# Patient Record
Sex: MALE | Race: WHITE | Employment: OTHER | URBAN - METROPOLITAN AREA
[De-identification: names, ages, dates, MRNs, and addresses within clinical notes are randomized per-mention and may not be internally consistent; named-entity substitution may affect disease eponyms.]

---

## 2017-08-16 ENCOUNTER — HOSPITAL ENCOUNTER (OUTPATIENT)
Dept: LAB | Age: 67
Discharge: HOME | End: 2017-08-16
Attending: INTERNAL MEDICINE
Payer: MEDICARE

## 2017-08-16 LAB
T4 FREE SERPL-MCNC: 1.3 NG/DL (ref 0.78–1.46)
TSH SERPL DL<=0.005 MIU/L-ACNC: 0.27 UIU/ML (ref 0.36–3.74)

## 2017-08-16 PROCEDURE — 84443 ASSAY THYROID STIM HORMONE: CPT | Performed by: INTERNAL MEDICINE

## 2017-08-16 PROCEDURE — 86800 THYROGLOBULIN ANTIBODY: CPT | Performed by: INTERNAL MEDICINE

## 2017-08-16 PROCEDURE — 36415 COLL VENOUS BLD VENIPUNCTURE: CPT | Performed by: INTERNAL MEDICINE

## 2017-08-16 PROCEDURE — 84432 ASSAY OF THYROGLOBULIN: CPT | Performed by: INTERNAL MEDICINE

## 2017-08-16 PROCEDURE — 84439 ASSAY OF FREE THYROXINE: CPT | Performed by: INTERNAL MEDICINE

## 2017-08-17 LAB
THYROGLOB AB SERPL-ACNC: <1 IU/ML (ref 0–0.9)
THYROGLOBULIN, SERUM, 006694: <0.1 NG/ML (ref 1.4–29.2)

## 2022-04-20 ENCOUNTER — HOSPITAL ENCOUNTER (INPATIENT)
Age: 72
LOS: 9 days | Discharge: HOME | DRG: 335 | End: 2022-04-29
Attending: EMERGENCY MEDICINE | Admitting: FAMILY MEDICINE
Payer: MEDICARE

## 2022-04-20 DIAGNOSIS — K45.8 INTERNAL HERNIA: ICD-10-CM

## 2022-04-20 DIAGNOSIS — D72.824 BASOPHILIC LEUKOCYTOSIS: ICD-10-CM

## 2022-04-20 DIAGNOSIS — K56.609 SBO (SMALL BOWEL OBSTRUCTION) (HCC): Primary | ICD-10-CM

## 2022-04-20 DIAGNOSIS — R10.84 GENERALIZED ABDOMINAL PAIN: ICD-10-CM

## 2022-04-20 DIAGNOSIS — D72.829 LEUKOCYTOSIS, UNSPECIFIED TYPE: ICD-10-CM

## 2022-04-20 LAB
ALBUMIN SERPL-MCNC: 3.8 G/DL (ref 3.2–4.6)
ALBUMIN/GLOB SERPL: 1 {RATIO} (ref 1.2–3.5)
ALP SERPL-CCNC: 104 U/L (ref 50–136)
ALT SERPL-CCNC: 55 U/L (ref 12–65)
ANION GAP SERPL CALC-SCNC: 9 MMOL/L (ref 7–16)
AST SERPL-CCNC: 37 U/L (ref 15–37)
BASOPHILS # BLD: 0 K/UL (ref 0–0.2)
BASOPHILS NFR BLD: 0 % (ref 0–2)
BILIRUB SERPL-MCNC: 1.1 MG/DL (ref 0.2–1.1)
BUN SERPL-MCNC: 22 MG/DL (ref 8–23)
CALCIUM SERPL-MCNC: 10 MG/DL (ref 8.3–10.4)
CHLORIDE SERPL-SCNC: 102 MMOL/L (ref 98–107)
CO2 SERPL-SCNC: 26 MMOL/L (ref 21–32)
CREAT SERPL-MCNC: 1.34 MG/DL (ref 0.8–1.5)
DIFFERENTIAL METHOD BLD: ABNORMAL
EOSINOPHIL # BLD: 0 K/UL (ref 0–0.8)
EOSINOPHIL NFR BLD: 0 % (ref 0.5–7.8)
ERYTHROCYTE [DISTWIDTH] IN BLOOD BY AUTOMATED COUNT: 13.6 % (ref 11.9–14.6)
EST. AVERAGE GLUCOSE BLD GHB EST-MCNC: 114 MG/DL
GLOBULIN SER CALC-MCNC: 4 G/DL (ref 2.3–3.5)
GLUCOSE SERPL-MCNC: 188 MG/DL (ref 65–100)
HBA1C MFR BLD: 5.6 % (ref 4.2–6.3)
HCT VFR BLD AUTO: 48.5 % (ref 41.1–50.3)
HGB BLD-MCNC: 16.8 G/DL (ref 13.6–17.2)
IMM GRANULOCYTES # BLD AUTO: 0.1 K/UL (ref 0–0.5)
IMM GRANULOCYTES NFR BLD AUTO: 0 % (ref 0–5)
LACTATE SERPL-SCNC: 1.5 MMOL/L (ref 0.4–2)
LIPASE SERPL-CCNC: 71 U/L (ref 73–393)
LYMPHOCYTES # BLD: 0.9 K/UL (ref 0.5–4.6)
LYMPHOCYTES NFR BLD: 5 % (ref 13–44)
MCH RBC QN AUTO: 32.1 PG (ref 26.1–32.9)
MCHC RBC AUTO-ENTMCNC: 34.6 G/DL (ref 31.4–35)
MCV RBC AUTO: 92.7 FL (ref 79.6–97.8)
MONOCYTES # BLD: 0.9 K/UL (ref 0.1–1.3)
MONOCYTES NFR BLD: 5 % (ref 4–12)
NEUTS SEG # BLD: 17.4 K/UL (ref 1.7–8.2)
NEUTS SEG NFR BLD: 90 % (ref 43–78)
NRBC # BLD: 0 K/UL (ref 0–0.2)
PLATELET # BLD AUTO: 260 K/UL (ref 150–450)
PMV BLD AUTO: 8.8 FL (ref 9.4–12.3)
POTASSIUM SERPL-SCNC: 4 MMOL/L (ref 3.5–5.1)
PROT SERPL-MCNC: 7.8 G/DL (ref 6.3–8.2)
RBC # BLD AUTO: 5.23 M/UL (ref 4.23–5.6)
SODIUM SERPL-SCNC: 137 MMOL/L (ref 136–145)
WBC # BLD AUTO: 19.3 K/UL (ref 4.3–11.1)

## 2022-04-20 PROCEDURE — 83690 ASSAY OF LIPASE: CPT

## 2022-04-20 PROCEDURE — 74011250636 HC RX REV CODE- 250/636: Performed by: FAMILY MEDICINE

## 2022-04-20 PROCEDURE — 74011250636 HC RX REV CODE- 250/636: Performed by: EMERGENCY MEDICINE

## 2022-04-20 PROCEDURE — 65270000046 HC RM TELEMETRY

## 2022-04-20 PROCEDURE — 81003 URINALYSIS AUTO W/O SCOPE: CPT

## 2022-04-20 PROCEDURE — 87040 BLOOD CULTURE FOR BACTERIA: CPT

## 2022-04-20 PROCEDURE — 85025 COMPLETE CBC W/AUTO DIFF WBC: CPT

## 2022-04-20 PROCEDURE — 99285 EMERGENCY DEPT VISIT HI MDM: CPT

## 2022-04-20 PROCEDURE — 80053 COMPREHEN METABOLIC PANEL: CPT

## 2022-04-20 PROCEDURE — 83605 ASSAY OF LACTIC ACID: CPT

## 2022-04-20 PROCEDURE — 74011000250 HC RX REV CODE- 250: Performed by: FAMILY MEDICINE

## 2022-04-20 PROCEDURE — 74011000258 HC RX REV CODE- 258: Performed by: EMERGENCY MEDICINE

## 2022-04-20 PROCEDURE — 83036 HEMOGLOBIN GLYCOSYLATED A1C: CPT

## 2022-04-20 PROCEDURE — 74011000250 HC RX REV CODE- 250: Performed by: PHYSICIAN ASSISTANT

## 2022-04-20 RX ORDER — METOPROLOL TARTRATE 5 MG/5ML
2.5 INJECTION INTRAVENOUS EVERY 8 HOURS
Status: DISCONTINUED | OUTPATIENT
Start: 2022-04-20 | End: 2022-04-23

## 2022-04-20 RX ORDER — ACETAMINOPHEN 650 MG/1
650 SUPPOSITORY RECTAL
Status: DISCONTINUED | OUTPATIENT
Start: 2022-04-20 | End: 2022-04-26

## 2022-04-20 RX ORDER — SODIUM CHLORIDE 0.9 % (FLUSH) 0.9 %
5-40 SYRINGE (ML) INJECTION EVERY 8 HOURS
Status: DISCONTINUED | OUTPATIENT
Start: 2022-04-20 | End: 2022-04-21

## 2022-04-20 RX ORDER — DEXTROSE MONOHYDRATE 100 MG/ML
125-250 INJECTION, SOLUTION INTRAVENOUS AS NEEDED
Status: DISCONTINUED | OUTPATIENT
Start: 2022-04-20 | End: 2022-04-29 | Stop reason: HOSPADM

## 2022-04-20 RX ORDER — DEXTROSE, SODIUM CHLORIDE, AND POTASSIUM CHLORIDE 5; .9; .15 G/100ML; G/100ML; G/100ML
100 INJECTION INTRAVENOUS CONTINUOUS
Status: DISCONTINUED | OUTPATIENT
Start: 2022-04-20 | End: 2022-04-22

## 2022-04-20 RX ORDER — ENOXAPARIN SODIUM 100 MG/ML
40 INJECTION SUBCUTANEOUS DAILY
Status: DISCONTINUED | OUTPATIENT
Start: 2022-04-21 | End: 2022-04-28

## 2022-04-20 RX ORDER — ONDANSETRON 2 MG/ML
4 INJECTION INTRAMUSCULAR; INTRAVENOUS
Status: DISCONTINUED | OUTPATIENT
Start: 2022-04-20 | End: 2022-04-29 | Stop reason: HOSPADM

## 2022-04-20 RX ORDER — HYDROMORPHONE HYDROCHLORIDE 1 MG/ML
1 INJECTION, SOLUTION INTRAMUSCULAR; INTRAVENOUS; SUBCUTANEOUS
Status: COMPLETED | OUTPATIENT
Start: 2022-04-20 | End: 2022-04-20

## 2022-04-20 RX ORDER — ACETAMINOPHEN 325 MG/1
650 TABLET ORAL
Status: DISCONTINUED | OUTPATIENT
Start: 2022-04-20 | End: 2022-04-29 | Stop reason: HOSPADM

## 2022-04-20 RX ORDER — SODIUM CHLORIDE 0.9 % (FLUSH) 0.9 %
5-40 SYRINGE (ML) INJECTION EVERY 8 HOURS
Status: DISCONTINUED | OUTPATIENT
Start: 2022-04-20 | End: 2022-04-29 | Stop reason: HOSPADM

## 2022-04-20 RX ORDER — SODIUM CHLORIDE 0.9 % (FLUSH) 0.9 %
5-40 SYRINGE (ML) INJECTION AS NEEDED
Status: DISCONTINUED | OUTPATIENT
Start: 2022-04-20 | End: 2022-04-29 | Stop reason: HOSPADM

## 2022-04-20 RX ORDER — MORPHINE SULFATE 4 MG/ML
4 INJECTION INTRAVENOUS
Status: DISCONTINUED | OUTPATIENT
Start: 2022-04-20 | End: 2022-04-20

## 2022-04-20 RX ORDER — NALOXONE HYDROCHLORIDE 0.4 MG/ML
0.2 INJECTION, SOLUTION INTRAMUSCULAR; INTRAVENOUS; SUBCUTANEOUS
Status: DISCONTINUED | OUTPATIENT
Start: 2022-04-20 | End: 2022-04-29 | Stop reason: HOSPADM

## 2022-04-20 RX ORDER — ASCORBIC ACID 500 MG
500 TABLET ORAL DAILY
COMMUNITY
End: 2022-04-29

## 2022-04-20 RX ORDER — MAGNESIUM SULFATE 100 %
16 CRYSTALS MISCELLANEOUS AS NEEDED
Status: DISCONTINUED | OUTPATIENT
Start: 2022-04-20 | End: 2022-04-29 | Stop reason: HOSPADM

## 2022-04-20 RX ORDER — ONDANSETRON 4 MG/1
4 TABLET, ORALLY DISINTEGRATING ORAL
Status: DISCONTINUED | OUTPATIENT
Start: 2022-04-20 | End: 2022-04-21

## 2022-04-20 RX ORDER — POLYETHYLENE GLYCOL 3350 17 G/17G
17 POWDER, FOR SOLUTION ORAL DAILY PRN
Status: DISCONTINUED | OUTPATIENT
Start: 2022-04-20 | End: 2022-04-21

## 2022-04-20 RX ORDER — HYDROMORPHONE HYDROCHLORIDE 1 MG/ML
0.5 INJECTION, SOLUTION INTRAMUSCULAR; INTRAVENOUS; SUBCUTANEOUS
Status: DISCONTINUED | OUTPATIENT
Start: 2022-04-20 | End: 2022-04-21

## 2022-04-20 RX ORDER — SODIUM CHLORIDE 0.9 % (FLUSH) 0.9 %
5-40 SYRINGE (ML) INJECTION AS NEEDED
Status: DISCONTINUED | OUTPATIENT
Start: 2022-04-20 | End: 2022-04-21

## 2022-04-20 RX ADMIN — METOPROLOL TARTRATE 2.5 MG: 5 INJECTION INTRAVENOUS at 21:57

## 2022-04-20 RX ADMIN — PIPERACILLIN AND TAZOBACTAM 4.5 G: 4; .5 INJECTION, POWDER, LYOPHILIZED, FOR SOLUTION INTRAVENOUS at 18:45

## 2022-04-20 RX ADMIN — SODIUM CHLORIDE 1000 ML: 900 INJECTION, SOLUTION INTRAVENOUS at 18:40

## 2022-04-20 RX ADMIN — MORPHINE SULFATE 4 MG: 4 INJECTION INTRAVENOUS at 18:45

## 2022-04-20 RX ADMIN — SODIUM CHLORIDE, PRESERVATIVE FREE 10 ML: 5 INJECTION INTRAVENOUS at 21:56

## 2022-04-20 RX ADMIN — HYDROMORPHONE HYDROCHLORIDE 1 MG: 1 INJECTION, SOLUTION INTRAMUSCULAR; INTRAVENOUS; SUBCUTANEOUS at 19:46

## 2022-04-20 RX ADMIN — POTASSIUM CHLORIDE, DEXTROSE MONOHYDRATE AND SODIUM CHLORIDE 100 ML/HR: 150; 5; 900 INJECTION, SOLUTION INTRAVENOUS at 21:10

## 2022-04-20 NOTE — H&P
Hospitalist History and Physical   Admit Date:  2022  4:44 PM   Name:  Tamanna Rose. Age:  80 y.o. Sex:  male  :  1950   MRN:  [id number]   Room:  [place] [address]    Presenting Complaint: Abdominal Pain and Abnormal Lab Results    Reason(s) for Admission: SBO (small bowel obstruction) ([facility] [address] [K56.609]     History of Present Illness:   Tamanna Hutchison is a 80 y.o. male with medical history of GERD s/p nissen fundoplication 12, thyroid CA s/p resection, HTN, CAD who presented with epigastrict abdominal pain and RUQ pain that started around 4pm yesterday. Has not passed gas or had a BM since onset of symptoms. He had an outpatient CT today showing high grade SBO @ [facility] but wanted to be admitted at [address] [place] [address] so he drove here. Labs significant for WBC 19K with neutrophile predominance. rec'd zosyn in ED. General surgery was notified and requested hospitalist admission. Patient in severe discomfort on my eval. Family at bedside. No PO intake > 24 hours. Review of Systems:  10 systems reviewed and negative except as noted in HPI. Assessment & Plan:     High grade SBO - NPO. IVF. NGT LIS. Pain control. Anti-emetics. Zosyn. Surgery to see. Postsurgical hypothyroidism - hold hormone replacement at this time due to NPO state. pendin gclinical course will start IV     HTN - chronically on BB. Start metoprolol 2.5 q8h IV to prevent rebound tachycardia     Hyperglycemia - perhaps reactive to stress. FSBQ q6h plus high sensitivity SSI    CAD - BB as above.  Hold other meds for now while NPO      GERD - IV pepcid daily       Dispo/Discharge Planning:     Admit remote tele     Diet: DIET NPO  VTE ppx: lovenox   Code status: Full Code    Hospital Problems as of 2022 Date Reviewed: 2021          Codes Class Noted - Resolved POA    * (Principal) SBO (small bowel obstruction) ([facility] [address] ICD-10-CM: Z59.681  ICD-9-CM: 560.9  2022 - Present Unknown        Postsurgical hypothyroidism ICD-10-CM: E89.0  ICD-9-CM: 244.0  Unknown - Present Yes        Papillary thyroid carcinoma, multifocal (3 tumors in the left lobe, largest tumor 0.9 cm), status post thyroidectomy/central lymph node dissection 5/26/2015 and 54.5 mCi iodine-131 7/22/2015 ICD-10-CM: C73  ICD-9-CM: 572  6/21/2016 - Present Yes        GERD (gastroesophageal reflux disease) ICD-10-CM: K21.9  ICD-9-CM: 530.81  Unknown - Present Yes        Chronic coronary artery disease ICD-10-CM: I25.10  ICD-9-CM: 414.00  9/3/2015 - Present Yes    Overview Signed 6/21/2016  8:29 AM by Randall JOHNS     Overview:   BMS RCA 2008; NORMAL STRESS TEST 2014    Last Assessment & Plan:   He does not have any anginal quality chest pain. He occasionally has sharp atypical pain. His stress test was normal less than a year ago. I don't plan any further investigation. Past History:  Past Medical History:   Diagnosis Date    Arthritis     Chronic coronary artery disease     Gastroesophageal reflux disease     Gout     Hypercholesterolemia     Hypertension     Malignant melanoma     Papillary thyroid carcinoma, multifocal (3 tumors in the left lobe, largest tumor 0.9 cm), status post thyroidectomy/central lymph node dissection 5/26/2015 and 54.5 mCi iodine-131 7/22/2015     Postsurgical hypothyroidism      Past Surgical History:   Procedure Laterality Date    HX APPENDECTOMY  1969    HX CHOLECYSTECTOMY  2005    HX CORONARY ARTERY BYPASS GRAFT  08/19/2016    Dr. Freda Luis at [address] [facility]  2010    HX GI  2008    Nissen fundoplication    HX KNEE ARTHROSCOPY Right 2005    HX MALIGNANT SKIN LESION EXCISION  2011    melanoma removed from ear lobe    HX THYROIDECTOMY  5/26/2015    including central lymph node dissection    HX TONSILLECTOMY  Age 4      No Known Allergies   Social History     Tobacco Use    Smoking status: Never Smoker    Smokeless tobacco: Never Used   Substance Use Topics    Alcohol use:  No Family History   Problem Relation Age of Onset    Cancer Mother         Lung    Arthritis-rheumatoid Father     Coronary Art Dis Father     Cancer Brother         Melanoma    Thyroid Cancer Neg Hx       Family history reviewed and negative except as noted above. Immunization History   Administered Date(s) Administered    Influenza Vaccine PF 12/06/2005     Prior to Admit Medications:  Current Outpatient Medications   Medication Instructions    allopurinoL (ZYLOPRIM) 300 mg, Oral    amitriptyline (ELAVIL) 10 mg, Oral, EVERY BEDTIME    amLODIPine (NORVASC) 5 mg tablet No dose, route, or frequency recorded.  aspirin delayed-release 81 mg, Oral    cholecalciferol (VITAMIN D3) 2,000 Units, Oral, DAILY    coenzyme Q10 200 mg, Oral, DAILY    cyanocobalamin, vitamin B-12, 5,000 mcg TbDL 1 Tablet, Oral    ibuprofen (MOTRIN) 800 mg, Oral, EVERY 6 HOURS AS NEEDED    isosorbide mononitrate ER (IMDUR) 30 mg tablet No dose, route, or frequency recorded.  KRILL OIL PO Oral    LevoxyL 137 mcg, Oral, DAILY BEFORE BREAKFAST    nitroglycerin (NITROSTAT) 0.4 mg, SubLINGual    pravastatin (PRAVACHOL) 80 mg, Oral    Toprol XL 25 mg, Oral, DAILY       Objective:     Patient Vitals for the past 24 hrs:   Temp Pulse Resp BP SpO2   04/20/22 1641 98.4 °F (36.9 °C) 95 20 (!) 146/68 91 %     Oxygen Therapy  O2 Sat (%): 91 % (04/20/22 1641)  O2 Device: None (Room air) (04/20/22 1641)    Estimated body mass index is 27.06 kg/m² as calculated from the following:    Height as of this encounter: 5' 8\" (1.727 m). Weight as of this encounter: 80.7 kg (178 lb). No intake or output data in the 24 hours ending 04/20/22 1832      Physical Exam:    Blood pressure (!) 146/68, pulse 95, temperature 98.4 °F (36.9 °C), resp. rate 20, height 5' 8\" (1.727 m), weight 80.7 kg (178 lb), SpO2 91 %. General:    Well nourished.   Moderate distress  Head:  Normocephalic, atraumatic  Eyes:  Sclerae appear normal.  Pupils equally round.  ENT:  Nares appear normal, no drainage. Moist oral mucosa  Neck:  No restricted ROM. Trachea midline   CV:   RRR. No m/r/g. No jugular venous distension. Lungs:   CTAB. No wheezing, rhonchi, or rales. Respirations even, unlabored  Abdomen: Bowel sounds absent. Distended, soft, tender. Extremities: No cyanosis or clubbing. No edema  Skin:     No rashes and normal coloration. Warm and dry. Neuro:  CN II-XII grossly intact. Sensation intact. A&Ox3  Psych:  Normal mood and affect. I have reviewed ordered lab tests and independently visualized imaging below:    Last 24hr Labs:  Recent Results (from the past 24 hour(s))   CBC WITH AUTOMATED DIFF    Collection Time: 04/20/22  5:08 PM   Result Value Ref Range    WBC 19.3 (H) 4.3 - 11.1 K/uL    RBC 5.23 4.23 - 5.6 M/uL    HGB 16.8 13.6 - 17.2 g/dL    HCT 48.5 41.1 - 50.3 %    MCV 92.7 79.6 - 97.8 FL    MCH 32.1 26.1 - 32.9 PG    MCHC 34.6 31.4 - 35.0 g/dL    RDW 13.6 11.9 - 14.6 %    PLATELET 321 737 - 434 K/uL    MPV 8.8 (L) 9.4 - 12.3 FL    ABSOLUTE NRBC 0.00 0.0 - 0.2 K/uL    DF AUTOMATED      NEUTROPHILS 90 (H) 43 - 78 %    LYMPHOCYTES 5 (L) 13 - 44 %    MONOCYTES 5 4.0 - 12.0 %    EOSINOPHILS 0 (L) 0.5 - 7.8 %    BASOPHILS 0 0.0 - 2.0 %    IMMATURE GRANULOCYTES 0 0.0 - 5.0 %    ABS. NEUTROPHILS 17.4 (H) 1.7 - 8.2 K/UL    ABS. LYMPHOCYTES 0.9 0.5 - 4.6 K/UL    ABS. MONOCYTES 0.9 0.1 - 1.3 K/UL    ABS. EOSINOPHILS 0.0 0.0 - 0.8 K/UL    ABS. BASOPHILS 0.0 0.0 - 0.2 K/UL    ABS. IMM.  GRANS. 0.1 0.0 - 0.5 K/UL   METABOLIC PANEL, COMPREHENSIVE    Collection Time: 04/20/22  5:08 PM   Result Value Ref Range    Sodium 137 136 - 145 mmol/L    Potassium 4.0 3.5 - 5.1 mmol/L    Chloride 102 98 - 107 mmol/L    CO2 26 21 - 32 mmol/L    Anion gap 9 7 - 16 mmol/L    Glucose 188 (H) 65 - 100 mg/dL    BUN 22 8 - 23 MG/DL    Creatinine 1.34 0.8 - 1.5 MG/DL    GFR est AA >60 >60 ml/min/1.73m2    GFR est non-AA 56 (L) >60 ml/min/1.73m2    Calcium 10.0 8.3 - 10.4 MG/DL    Bilirubin, total 1.1 0.2 - 1.1 MG/DL    ALT (SGPT) 55 12 - 65 U/L    AST (SGOT) 37 15 - 37 U/L    Alk. phosphatase 104 50 - 136 U/L    Protein, total 7.8 6.3 - 8.2 g/dL    Albumin 3.8 3.2 - 4.6 g/dL    Globulin 4.0 (H) 2.3 - 3.5 g/dL    A-G Ratio 1.0 (L) 1.2 - 3.5     LIPASE    Collection Time: 04/20/22  5:08 PM   Result Value Ref Range    Lipase 71 (L) 73 - 393 U/L       All Micro Results     Procedure Component Value Units Date/Time    CULTURE, BLOOD [[id number]] Collected: 04/20/22 1759    Order Status: Completed Specimen: Blood Updated: 04/20/22 1815    CULTURE, BLOOD [[id number]]     Order Status: Sent Specimen: Blood           Other Studies:  No results found. Signed:  Aramis Escobar DO    Part of this note may have been written by using a voice dictation software. The note has been proof read but may still contain some grammatical/other typographical errors.

## 2022-04-20 NOTE — ED NOTES
Pt to er c/o abd pain started yesterday, saw pmd yesterday who ordered outpt  Ct, + for sbo, pt had had previous nisson, surgery, appy and cholecystectomy. No fever  Patient evaluated initially in triage. Rapid Medical Evaluation was conducted and necessary orders have been placed. I have performed a medical screening exam.  Care will now be transferred to the provider in the back of the emergency department.   TEOFILO Rodrigues 4:43 PM

## 2022-04-20 NOTE — ED PROVIDER NOTES
10year-old male presenting to the emergency department today complaining of epigastric abdominal pain and right upper quadrant abdominal pain. The patient said it started around 4:00 yesterday. He has not been passing gas or had a bowel movement since onset of symptoms. The patient states that he had a Nissen fundoplication in 5723 by Dr. Farhana Izquierdo. The patient had this because of history of chronic acid reflux but did not have any improvement in his symptoms after the procedure. The patient had an outpatient CT done today which showed a high-grade small bowel obstruction and he [facility] to be admitted to [facility] not [facility] so he drove here.            Past Medical History:   Diagnosis Date    Arthritis     Chronic coronary artery disease     Gastroesophageal reflux disease     Gout     Hypercholesterolemia     Hypertension     Malignant melanoma     Papillary thyroid carcinoma, multifocal (3 tumors in the left lobe, largest tumor 0.9 cm), status post thyroidectomy/central lymph node dissection 5/26/2015 and 54.5 mCi iodine-131 7/22/2015     Postsurgical hypothyroidism        Past Surgical History:   Procedure Laterality Date    HX APPENDECTOMY  1969    HX CHOLECYSTECTOMY  2005    HX CORONARY ARTERY BYPASS GRAFT  08/19/2016    Dr. Ernie Rangel at [address] [facility]  2010    HX GI  2008    Nissen fundoplication    HX KNEE ARTHROSCOPY Right 2005    HX MALIGNANT SKIN LESION EXCISION  2011    melanoma removed from ear lobe    HX THYROIDECTOMY  5/26/2015    including central lymph node dissection    HX TONSILLECTOMY  Age 4         Family History:   Problem Relation Age of Onset    Cancer Mother         Lung    Arthritis-rheumatoid Father     Coronary Art Dis Father     Cancer Brother         Melanoma    Thyroid Cancer Neg Hx        Social History     Socioeconomic History    Marital status:      Spouse name: Not on file    Number of children: Not on file    Years of education: Not on file    Highest education level: Not on file   Occupational History    Not on file   Tobacco Use    Smoking status: Never Smoker    Smokeless tobacco: Never Used   Substance and Sexual Activity    Alcohol use: No    Drug use: No    Sexual activity: Not on file   Other Topics Concern    Not on file   Social History Narrative    Not on file     Social Determinants of Health     Financial Resource Strain:     Difficulty of Paying Living Expenses: Not on file   Food Insecurity:     Worried About Running Out of Food in the Last Year: Not on file    [place] of Food in the Last Year: Not on file   Transportation Needs:     Lack of Transportation (Medical): Not on file    Lack of Transportation (Non-Medical): Not on file   Physical Activity:     Days of Exercise per Week: Not on file    Minutes of Exercise per Session: Not on file   Stress:     Feeling of Stress : Not on file   Social Connections:     Frequency of Communication with Friends and Family: Not on file    Frequency of Social Gatherings with Friends and Family: Not on file    Attends [employer] Services: Not on file    Active Member of [address] [place] [address] or Organizations: Not on file    Attends Club or Organization Meetings: Not on file    Marital Status: Not on file   Intimate Partner Violence:     Fear of Current or Ex-Partner: Not on file    Emotionally Abused: Not on file    Physically Abused: Not on file    Sexually Abused: Not on file   Housing Stability:     Unable to Pay for Housing in the Last Year: Not on file    Number of [place] in the Last Year: Not on file    Unstable Housing in the Last Year: Not on file         ALLERGIES: Patient has no known allergies. Review of Systems   Gastrointestinal: Positive for abdominal distention, abdominal pain and nausea. Negative for vomiting. All other systems reviewed and are negative.       Vitals:    04/20/22 1641   BP: (!) 146/68   Pulse: 95   Resp: 20   Temp: 98.4 °F (36.9 °C)   SpO2: 91%   Weight: 80.7 kg (178 lb)   Height: 5' 8\" (1.727 m)            Physical Exam     GENERAL:The patient has Body mass index is 27.06 kg/m². Well-hydrated. VITAL SIGNS: Heart rate, blood pressure, respiratory rate reviewed as recorded in  nurse's notes  EYES: Pupils reactive. Extraocular motion intact. No conjunctival redness or drainage. NECK: Supple, no meningeal signs. Trachea midline. No masses or thyromegaly. LUNGS: Breath sounds clear and equal bilaterally no accessory muscle use. CHEST: No deformity  CARDIOVASCULAR: Regular rate and rhythm  ABDOMEN: Soft with epigastric and RUQ tenderness. No palpable masses or organomegaly. No  peritoneal signs. No rigidity. EXTREMITIES: No clubbing or cyanosis. No joint swelling. Normal muscle tone. No  restricted range of motion appreciated. NEUROLOGIC: Sensation is grossly intact. Cranial nerve exam reveals face is  symmetrical, tongue is midline speech is clear. SKIN: No rash or petechiae. Good skin turgor palpated. PSYCHIATRIC: Alert and oriented. Appropriate behavior and judgment. MDM  Number of Diagnoses or Management Options  Diagnosis management comments: Abdominal wall pain,     Constipation, fecal impaction, small bowel obstruction, partial small bowel obstruction,  Ileus          Amount and/or Complexity of Data Reviewed  Clinical lab tests: ordered and reviewed  Tests in the radiology section of CPT®: reviewed  Tests in the medicine section of CPT®: reviewed and ordered  Review and summarize past medical records: yes  Discuss the patient with other providers: yes      ED Course as of 04/20/22 1753   Wed Apr 20, 2022   5877 I spoke to Dr. Isrrael Walters with general surgery and he requested that hospitalist admit the patient and they will consult tomorrow. NG tube to be placed in the ER. [AV]   6616 I talked to the patient with the findings on his CT and the plan of care for treating his small bowel obstruction.   He is agreeable with this plan. [DX]   3180 Because of the elevated white count with left shift and high-grade small bowel obstruction patient will be started on prophylactic antibiotic coverage for possible intestinal perforation not seen on CT. [KH]   1751 Hospitalist Dr. Robert Danielle was consulted and patient will be admitted to their service.  [KH]      ED Course User Index  [KH] Nereida Zuniga DO       Procedures

## 2022-04-20 NOTE — ED NOTES
TRANSFER - OUT REPORT:    Verbal report given to [employer] RN on [facility].  being transferred to room 341 for routine progression of care       Report consisted of patients Situation, Background, Assessment and   Recommendations(SBAR). Information from the following report(s) SBAR was reviewed with the receiving nurse. Lines:   Peripheral IV 04/20/22 Left Antecubital (Active)        Opportunity for questions and clarification was provided.       Patient transported with:   [employer]

## 2022-04-20 NOTE — ED TRIAGE NOTES
Pt states he had outpatient CT and was told he had a bowel obstruction. Pt with sever abdominal pain since yesterday.

## 2022-04-21 ENCOUNTER — APPOINTMENT (OUTPATIENT)
Dept: GENERAL RADIOLOGY | Age: 72
DRG: 335 | End: 2022-04-21
Attending: FAMILY MEDICINE
Payer: MEDICARE

## 2022-04-21 ENCOUNTER — ANESTHESIA (OUTPATIENT)
Dept: SURGERY | Age: 72
DRG: 335 | End: 2022-04-21
Payer: MEDICARE

## 2022-04-21 ENCOUNTER — ANESTHESIA EVENT (OUTPATIENT)
Dept: SURGERY | Age: 72
DRG: 335 | End: 2022-04-21
Payer: MEDICARE

## 2022-04-21 ENCOUNTER — APPOINTMENT (OUTPATIENT)
Dept: ULTRASOUND IMAGING | Age: 72
DRG: 335 | End: 2022-04-21
Attending: FAMILY MEDICINE
Payer: MEDICARE

## 2022-04-21 PROBLEM — R74.01 TRANSAMINITIS: Status: ACTIVE | Noted: 2022-04-21

## 2022-04-21 PROBLEM — N17.0 ACUTE KIDNEY INJURY (AKI) WITH ACUTE TUBULAR NECROSIS (ATN) (HCC): Status: ACTIVE | Noted: 2022-04-21

## 2022-04-21 PROBLEM — D72.829 LEUKOCYTOSIS: Status: ACTIVE | Noted: 2022-04-21

## 2022-04-21 PROBLEM — R10.84 GENERALIZED ABDOMINAL PAIN: Status: ACTIVE | Noted: 2022-04-21

## 2022-04-21 LAB
ALBUMIN SERPL-MCNC: 3.1 G/DL (ref 3.2–4.6)
ALBUMIN/GLOB SERPL: 0.9 {RATIO} (ref 1.2–3.5)
ALP SERPL-CCNC: 115 U/L (ref 50–136)
ALT SERPL-CCNC: 395 U/L (ref 12–65)
ANION GAP SERPL CALC-SCNC: 5 MMOL/L (ref 7–16)
AST SERPL-CCNC: 213 U/L (ref 15–37)
BASOPHILS # BLD: 0.1 K/UL (ref 0–0.2)
BASOPHILS NFR BLD: 0 % (ref 0–2)
BILIRUB SERPL-MCNC: 1.7 MG/DL (ref 0.2–1.1)
BUN SERPL-MCNC: 29 MG/DL (ref 8–23)
CALCIUM SERPL-MCNC: 9.3 MG/DL (ref 8.3–10.4)
CHLORIDE SERPL-SCNC: 106 MMOL/L (ref 98–107)
CO2 SERPL-SCNC: 27 MMOL/L (ref 21–32)
CREAT SERPL-MCNC: 1.49 MG/DL (ref 0.8–1.5)
DIFFERENTIAL METHOD BLD: ABNORMAL
EOSINOPHIL # BLD: 0 K/UL (ref 0–0.8)
EOSINOPHIL NFR BLD: 0 % (ref 0.5–7.8)
ERYTHROCYTE [DISTWIDTH] IN BLOOD BY AUTOMATED COUNT: 13.9 % (ref 11.9–14.6)
GLOBULIN SER CALC-MCNC: 3.6 G/DL (ref 2.3–3.5)
GLUCOSE BLD STRIP.AUTO-MCNC: 114 MG/DL (ref 65–100)
GLUCOSE BLD STRIP.AUTO-MCNC: 142 MG/DL (ref 65–100)
GLUCOSE BLD STRIP.AUTO-MCNC: 179 MG/DL (ref 65–100)
GLUCOSE BLD STRIP.AUTO-MCNC: 215 MG/DL (ref 65–100)
GLUCOSE SERPL-MCNC: 203 MG/DL (ref 65–100)
HCT VFR BLD AUTO: 48.2 % (ref 41.1–50.3)
HGB BLD-MCNC: 16.6 G/DL (ref 13.6–17.2)
IMM GRANULOCYTES # BLD AUTO: 0.1 K/UL (ref 0–0.5)
IMM GRANULOCYTES NFR BLD AUTO: 0 % (ref 0–5)
LACTATE SERPL-SCNC: 1.9 MMOL/L (ref 0.4–2)
LYMPHOCYTES # BLD: 0.6 K/UL (ref 0.5–4.6)
LYMPHOCYTES NFR BLD: 3 % (ref 13–44)
MAGNESIUM SERPL-MCNC: 2.1 MG/DL (ref 1.8–2.4)
MCH RBC QN AUTO: 32.2 PG (ref 26.1–32.9)
MCHC RBC AUTO-ENTMCNC: 34.4 G/DL (ref 31.4–35)
MCV RBC AUTO: 93.6 FL (ref 79.6–97.8)
MONOCYTES # BLD: 1.1 K/UL (ref 0.1–1.3)
MONOCYTES NFR BLD: 7 % (ref 4–12)
NEUTS SEG # BLD: 14.9 K/UL (ref 1.7–8.2)
NEUTS SEG NFR BLD: 89 % (ref 43–78)
NRBC # BLD: 0 K/UL (ref 0–0.2)
PLATELET # BLD AUTO: 250 K/UL (ref 150–450)
PMV BLD AUTO: 8.7 FL (ref 9.4–12.3)
POTASSIUM SERPL-SCNC: 4.3 MMOL/L (ref 3.5–5.1)
PROT SERPL-MCNC: 6.7 G/DL (ref 6.3–8.2)
RBC # BLD AUTO: 5.15 M/UL (ref 4.23–5.6)
SERVICE CMNT-IMP: ABNORMAL
SODIUM SERPL-SCNC: 138 MMOL/L (ref 136–145)
WBC # BLD AUTO: 16.7 K/UL (ref 4.3–11.1)

## 2022-04-21 PROCEDURE — 77030040922 HC BLNKT HYPOTHRM STRY -A: Performed by: ANESTHESIOLOGY

## 2022-04-21 PROCEDURE — 44050 REDUCE BOWEL OBSTRUCTION: CPT | Performed by: SURGERY

## 2022-04-21 PROCEDURE — 76210000016 HC OR PH I REC 1 TO 1.5 HR: Performed by: SURGERY

## 2022-04-21 PROCEDURE — 77030040830 HC CATH URETH FOL MDII -A: Performed by: SURGERY

## 2022-04-21 PROCEDURE — 74011250636 HC RX REV CODE- 250/636: Performed by: SURGERY

## 2022-04-21 PROCEDURE — 76060000033 HC ANESTHESIA 1 TO 1.5 HR: Performed by: SURGERY

## 2022-04-21 PROCEDURE — 80053 COMPREHEN METABOLIC PANEL: CPT

## 2022-04-21 PROCEDURE — 74011000258 HC RX REV CODE- 258: Performed by: FAMILY MEDICINE

## 2022-04-21 PROCEDURE — 74011000250 HC RX REV CODE- 250

## 2022-04-21 PROCEDURE — 36415 COLL VENOUS BLD VENIPUNCTURE: CPT

## 2022-04-21 PROCEDURE — 76705 ECHO EXAM OF ABDOMEN: CPT

## 2022-04-21 PROCEDURE — 65270000029 HC RM PRIVATE

## 2022-04-21 PROCEDURE — 0DN80ZZ RELEASE SMALL INTESTINE, OPEN APPROACH: ICD-10-PCS | Performed by: SURGERY

## 2022-04-21 PROCEDURE — 83735 ASSAY OF MAGNESIUM: CPT

## 2022-04-21 PROCEDURE — 77030002996 HC SUT SLK J&J -A: Performed by: SURGERY

## 2022-04-21 PROCEDURE — 74011000258 HC RX REV CODE- 258: Performed by: SURGERY

## 2022-04-21 PROCEDURE — 83605 ASSAY OF LACTIC ACID: CPT

## 2022-04-21 PROCEDURE — 74018 RADEX ABDOMEN 1 VIEW: CPT

## 2022-04-21 PROCEDURE — 74011250636 HC RX REV CODE- 250/636: Performed by: ANESTHESIOLOGY

## 2022-04-21 PROCEDURE — 74011000250 HC RX REV CODE- 250: Performed by: PHYSICIAN ASSISTANT

## 2022-04-21 PROCEDURE — 76010000161 HC OR TIME 1 TO 1.5 HR INTENSV-TIER 1: Performed by: SURGERY

## 2022-04-21 PROCEDURE — 74011000250 HC RX REV CODE- 250: Performed by: FAMILY MEDICINE

## 2022-04-21 PROCEDURE — 77030021678 HC GLIDESCP STAT DISP VERT -B: Performed by: ANESTHESIOLOGY

## 2022-04-21 PROCEDURE — 2709999900 HC NON-CHARGEABLE SUPPLY

## 2022-04-21 PROCEDURE — 85025 COMPLETE CBC W/AUTO DIFF WBC: CPT

## 2022-04-21 PROCEDURE — 77030011266 HC ELECTRD BLD INSL COVD -A: Performed by: SURGERY

## 2022-04-21 PROCEDURE — 74011250636 HC RX REV CODE- 250/636

## 2022-04-21 PROCEDURE — 77030019908 HC STETH ESOPH SIMS -A: Performed by: ANESTHESIOLOGY

## 2022-04-21 PROCEDURE — 77030037088 HC TUBE ENDOTRACH ORAL NSL COVD-A: Performed by: ANESTHESIOLOGY

## 2022-04-21 PROCEDURE — 74011250636 HC RX REV CODE- 250/636: Performed by: FAMILY MEDICINE

## 2022-04-21 PROCEDURE — 77030003028 HC SUT VCRL J&J -A: Performed by: SURGERY

## 2022-04-21 PROCEDURE — 74011000250 HC RX REV CODE- 250: Performed by: SURGERY

## 2022-04-21 PROCEDURE — 74011636637 HC RX REV CODE- 636/637: Performed by: FAMILY MEDICINE

## 2022-04-21 PROCEDURE — 99223 1ST HOSP IP/OBS HIGH 75: CPT | Performed by: SURGERY

## 2022-04-21 PROCEDURE — 77030008462 HC STPLR SKN PROX J&J -A: Performed by: SURGERY

## 2022-04-21 PROCEDURE — 2709999900 HC NON-CHARGEABLE SUPPLY: Performed by: SURGERY

## 2022-04-21 PROCEDURE — 82962 GLUCOSE BLOOD TEST: CPT

## 2022-04-21 PROCEDURE — 77030002966 HC SUT PDS J&J -A: Performed by: SURGERY

## 2022-04-21 PROCEDURE — 77030018836 HC SOL IRR NACL ICUM -A: Performed by: SURGERY

## 2022-04-21 RX ORDER — EPHEDRINE SULFATE/0.9% NACL/PF 50 MG/5 ML
SYRINGE (ML) INTRAVENOUS AS NEEDED
Status: DISCONTINUED | OUTPATIENT
Start: 2022-04-21 | End: 2022-04-21 | Stop reason: HOSPADM

## 2022-04-21 RX ORDER — INSULIN GLARGINE 100 [IU]/ML
5 INJECTION, SOLUTION SUBCUTANEOUS DAILY
Status: DISCONTINUED | OUTPATIENT
Start: 2022-04-21 | End: 2022-04-23

## 2022-04-21 RX ORDER — PROPOFOL 10 MG/ML
INJECTION, EMULSION INTRAVENOUS AS NEEDED
Status: DISCONTINUED | OUTPATIENT
Start: 2022-04-21 | End: 2022-04-21 | Stop reason: HOSPADM

## 2022-04-21 RX ORDER — DIPHENHYDRAMINE HYDROCHLORIDE 50 MG/ML
12.5 INJECTION, SOLUTION INTRAMUSCULAR; INTRAVENOUS
Status: DISCONTINUED | OUTPATIENT
Start: 2022-04-21 | End: 2022-04-21 | Stop reason: HOSPADM

## 2022-04-21 RX ORDER — FENTANYL CITRATE 50 UG/ML
100 INJECTION, SOLUTION INTRAMUSCULAR; INTRAVENOUS ONCE
Status: DISCONTINUED | OUTPATIENT
Start: 2022-04-21 | End: 2022-04-21

## 2022-04-21 RX ORDER — GLYCOPYRROLATE 0.2 MG/ML
INJECTION INTRAMUSCULAR; INTRAVENOUS AS NEEDED
Status: DISCONTINUED | OUTPATIENT
Start: 2022-04-21 | End: 2022-04-21 | Stop reason: HOSPADM

## 2022-04-21 RX ORDER — ONDANSETRON 2 MG/ML
4 INJECTION INTRAMUSCULAR; INTRAVENOUS ONCE
Status: DISCONTINUED | OUTPATIENT
Start: 2022-04-21 | End: 2022-04-21 | Stop reason: HOSPADM

## 2022-04-21 RX ORDER — OXYCODONE HYDROCHLORIDE 5 MG/1
10 TABLET ORAL
Status: DISCONTINUED | OUTPATIENT
Start: 2022-04-21 | End: 2022-04-21 | Stop reason: HOSPADM

## 2022-04-21 RX ORDER — SUCCINYLCHOLINE CHLORIDE 20 MG/ML
INJECTION INTRAMUSCULAR; INTRAVENOUS AS NEEDED
Status: DISCONTINUED | OUTPATIENT
Start: 2022-04-21 | End: 2022-04-21 | Stop reason: HOSPADM

## 2022-04-21 RX ORDER — SODIUM CHLORIDE, SODIUM LACTATE, POTASSIUM CHLORIDE, CALCIUM CHLORIDE 600; 310; 30; 20 MG/100ML; MG/100ML; MG/100ML; MG/100ML
INJECTION, SOLUTION INTRAVENOUS
Status: DISCONTINUED | OUTPATIENT
Start: 2022-04-21 | End: 2022-04-21 | Stop reason: HOSPADM

## 2022-04-21 RX ORDER — KETAMINE HYDROCHLORIDE 50 MG/ML
INJECTION, SOLUTION INTRAMUSCULAR; INTRAVENOUS AS NEEDED
Status: DISCONTINUED | OUTPATIENT
Start: 2022-04-21 | End: 2022-04-21 | Stop reason: HOSPADM

## 2022-04-21 RX ORDER — OXYCODONE HYDROCHLORIDE 5 MG/1
5 TABLET ORAL
Status: DISCONTINUED | OUTPATIENT
Start: 2022-04-21 | End: 2022-04-21 | Stop reason: HOSPADM

## 2022-04-21 RX ORDER — ALBUTEROL SULFATE 0.83 MG/ML
2.5 SOLUTION RESPIRATORY (INHALATION) AS NEEDED
Status: DISCONTINUED | OUTPATIENT
Start: 2022-04-21 | End: 2022-04-21 | Stop reason: HOSPADM

## 2022-04-21 RX ORDER — LIDOCAINE HYDROCHLORIDE 10 MG/ML
0.1 INJECTION INFILTRATION; PERINEURAL AS NEEDED
Status: DISCONTINUED | OUTPATIENT
Start: 2022-04-21 | End: 2022-04-25 | Stop reason: HOSPADM

## 2022-04-21 RX ORDER — ROCURONIUM BROMIDE 10 MG/ML
INJECTION, SOLUTION INTRAVENOUS AS NEEDED
Status: DISCONTINUED | OUTPATIENT
Start: 2022-04-21 | End: 2022-04-21 | Stop reason: HOSPADM

## 2022-04-21 RX ORDER — MIDAZOLAM HYDROCHLORIDE 1 MG/ML
2 INJECTION, SOLUTION INTRAMUSCULAR; INTRAVENOUS ONCE
Status: DISCONTINUED | OUTPATIENT
Start: 2022-04-21 | End: 2022-04-21

## 2022-04-21 RX ORDER — SODIUM CHLORIDE, SODIUM LACTATE, POTASSIUM CHLORIDE, CALCIUM CHLORIDE 600; 310; 30; 20 MG/100ML; MG/100ML; MG/100ML; MG/100ML
100 INJECTION, SOLUTION INTRAVENOUS CONTINUOUS
Status: DISCONTINUED | OUTPATIENT
Start: 2022-04-21 | End: 2022-04-21 | Stop reason: HOSPADM

## 2022-04-21 RX ORDER — SODIUM CHLORIDE, SODIUM LACTATE, POTASSIUM CHLORIDE, CALCIUM CHLORIDE 600; 310; 30; 20 MG/100ML; MG/100ML; MG/100ML; MG/100ML
100 INJECTION, SOLUTION INTRAVENOUS CONTINUOUS
Status: DISCONTINUED | OUTPATIENT
Start: 2022-04-21 | End: 2022-04-21

## 2022-04-21 RX ORDER — CEFAZOLIN SODIUM/WATER 2 G/20 ML
2 SYRINGE (ML) INTRAVENOUS
Status: COMPLETED | OUTPATIENT
Start: 2022-04-21 | End: 2022-04-21

## 2022-04-21 RX ORDER — NEOSTIGMINE METHYLSULFATE 1 MG/ML
INJECTION, SOLUTION INTRAVENOUS AS NEEDED
Status: DISCONTINUED | OUTPATIENT
Start: 2022-04-21 | End: 2022-04-21 | Stop reason: HOSPADM

## 2022-04-21 RX ORDER — ONDANSETRON 2 MG/ML
INJECTION INTRAMUSCULAR; INTRAVENOUS AS NEEDED
Status: DISCONTINUED | OUTPATIENT
Start: 2022-04-21 | End: 2022-04-21 | Stop reason: HOSPADM

## 2022-04-21 RX ORDER — MIDAZOLAM HYDROCHLORIDE 1 MG/ML
2 INJECTION, SOLUTION INTRAMUSCULAR; INTRAVENOUS
Status: COMPLETED | OUTPATIENT
Start: 2022-04-21 | End: 2022-04-21

## 2022-04-21 RX ORDER — HYDROMORPHONE HYDROCHLORIDE 2 MG/ML
0.5 INJECTION, SOLUTION INTRAMUSCULAR; INTRAVENOUS; SUBCUTANEOUS
Status: DISCONTINUED | OUTPATIENT
Start: 2022-04-21 | End: 2022-04-21 | Stop reason: HOSPADM

## 2022-04-21 RX ORDER — FENTANYL CITRATE 50 UG/ML
INJECTION, SOLUTION INTRAMUSCULAR; INTRAVENOUS AS NEEDED
Status: DISCONTINUED | OUTPATIENT
Start: 2022-04-21 | End: 2022-04-21 | Stop reason: HOSPADM

## 2022-04-21 RX ORDER — LIDOCAINE HYDROCHLORIDE 20 MG/ML
INJECTION, SOLUTION EPIDURAL; INFILTRATION; INTRACAUDAL; PERINEURAL AS NEEDED
Status: DISCONTINUED | OUTPATIENT
Start: 2022-04-21 | End: 2022-04-21 | Stop reason: HOSPADM

## 2022-04-21 RX ORDER — NALOXONE HYDROCHLORIDE 0.4 MG/ML
0.1 INJECTION, SOLUTION INTRAMUSCULAR; INTRAVENOUS; SUBCUTANEOUS AS NEEDED
Status: DISCONTINUED | OUTPATIENT
Start: 2022-04-21 | End: 2022-04-21 | Stop reason: HOSPADM

## 2022-04-21 RX ORDER — HYDROMORPHONE HYDROCHLORIDE 1 MG/ML
.3-.6 INJECTION, SOLUTION INTRAMUSCULAR; INTRAVENOUS; SUBCUTANEOUS
Status: DISCONTINUED | OUTPATIENT
Start: 2022-04-21 | End: 2022-04-22

## 2022-04-21 RX ADMIN — Medication 2 G: at 13:00

## 2022-04-21 RX ADMIN — PIPERACILLIN AND TAZOBACTAM 3.38 G: 3; .375 INJECTION, POWDER, LYOPHILIZED, FOR SOLUTION INTRAVENOUS at 08:29

## 2022-04-21 RX ADMIN — HYDROMORPHONE HYDROCHLORIDE 0.5 MG: 1 INJECTION, SOLUTION INTRAMUSCULAR; INTRAVENOUS; SUBCUTANEOUS at 12:14

## 2022-04-21 RX ADMIN — PHENYLEPHRINE HYDROCHLORIDE 100 MCG: 10 INJECTION INTRAVENOUS at 13:18

## 2022-04-21 RX ADMIN — PROPOFOL 180 MG: 10 INJECTION, EMULSION INTRAVENOUS at 13:22

## 2022-04-21 RX ADMIN — HYDROMORPHONE HYDROCHLORIDE 0.6 MG: 1 INJECTION, SOLUTION INTRAMUSCULAR; INTRAVENOUS; SUBCUTANEOUS at 21:26

## 2022-04-21 RX ADMIN — SODIUM CHLORIDE, SODIUM LACTATE, POTASSIUM CHLORIDE, AND CALCIUM CHLORIDE: 600; 310; 30; 20 INJECTION, SOLUTION INTRAVENOUS at 12:49

## 2022-04-21 RX ADMIN — ROCURONIUM BROMIDE 5 MG: 50 INJECTION, SOLUTION INTRAVENOUS at 13:22

## 2022-04-21 RX ADMIN — SODIUM CHLORIDE, SODIUM LACTATE, POTASSIUM CHLORIDE, AND CALCIUM CHLORIDE: 600; 310; 30; 20 INJECTION, SOLUTION INTRAVENOUS at 13:20

## 2022-04-21 RX ADMIN — SODIUM CHLORIDE, SODIUM LACTATE, POTASSIUM CHLORIDE, AND CALCIUM CHLORIDE: 600; 310; 30; 20 INJECTION, SOLUTION INTRAVENOUS at 13:28

## 2022-04-21 RX ADMIN — PIPERACILLIN AND TAZOBACTAM 3.38 G: 3; .375 INJECTION, POWDER, LYOPHILIZED, FOR SOLUTION INTRAVENOUS at 01:00

## 2022-04-21 RX ADMIN — LIDOCAINE HYDROCHLORIDE 100 MG: 20 INJECTION, SOLUTION EPIDURAL; INFILTRATION; INTRACAUDAL; PERINEURAL at 13:22

## 2022-04-21 RX ADMIN — SUGAMMADEX 200 MG: 100 INJECTION, SOLUTION INTRAVENOUS at 13:53

## 2022-04-21 RX ADMIN — Medication 20 MG: at 13:12

## 2022-04-21 RX ADMIN — Medication 20 MG: at 13:03

## 2022-04-21 RX ADMIN — HYDROMORPHONE HYDROCHLORIDE 0.6 MG: 1 INJECTION, SOLUTION INTRAMUSCULAR; INTRAVENOUS; SUBCUTANEOUS at 18:19

## 2022-04-21 RX ADMIN — SODIUM CHLORIDE, PRESERVATIVE FREE 20 MG: 5 INJECTION INTRAVENOUS at 08:29

## 2022-04-21 RX ADMIN — KETAMINE HYDROCHLORIDE 25 MG: 50 INJECTION, SOLUTION INTRAMUSCULAR; INTRAVENOUS at 13:17

## 2022-04-21 RX ADMIN — HYDROMORPHONE HYDROCHLORIDE 0.5 MG: 2 INJECTION, SOLUTION INTRAMUSCULAR; INTRAVENOUS; SUBCUTANEOUS at 14:38

## 2022-04-21 RX ADMIN — INSULIN HUMAN 2 UNITS: 100 INJECTION, SOLUTION PARENTERAL at 06:45

## 2022-04-21 RX ADMIN — ONDANSETRON 4 MG: 2 INJECTION INTRAMUSCULAR; INTRAVENOUS at 13:37

## 2022-04-21 RX ADMIN — GLYCOPYRROLATE 0.8 MG: 0.2 INJECTION, SOLUTION INTRAMUSCULAR; INTRAVENOUS at 13:43

## 2022-04-21 RX ADMIN — METOPROLOL TARTRATE 2.5 MG: 5 INJECTION INTRAVENOUS at 05:04

## 2022-04-21 RX ADMIN — Medication 5 MG: at 13:43

## 2022-04-21 RX ADMIN — HYDROMORPHONE HYDROCHLORIDE 0.5 MG: 2 INJECTION, SOLUTION INTRAMUSCULAR; INTRAVENOUS; SUBCUTANEOUS at 14:33

## 2022-04-21 RX ADMIN — FENTANYL CITRATE 100 MCG: 50 INJECTION INTRAMUSCULAR; INTRAVENOUS at 12:53

## 2022-04-21 RX ADMIN — ROCURONIUM BROMIDE 40 MG: 50 INJECTION, SOLUTION INTRAVENOUS at 13:17

## 2022-04-21 RX ADMIN — SODIUM CHLORIDE, PRESERVATIVE FREE 10 ML: 5 INJECTION INTRAVENOUS at 05:03

## 2022-04-21 RX ADMIN — PIPERACILLIN AND TAZOBACTAM 3.38 G: 3; .375 INJECTION, POWDER, LYOPHILIZED, FOR SOLUTION INTRAVENOUS at 17:00

## 2022-04-21 RX ADMIN — MIDAZOLAM 2 MG: 1 INJECTION INTRAMUSCULAR; INTRAVENOUS at 12:28

## 2022-04-21 RX ADMIN — SODIUM CHLORIDE, PRESERVATIVE FREE 10 ML: 5 INJECTION INTRAVENOUS at 21:29

## 2022-04-21 RX ADMIN — SUCCINYLCHOLINE CHLORIDE 180 MG: 20 INJECTION, SOLUTION INTRAMUSCULAR; INTRAVENOUS at 13:22

## 2022-04-21 RX ADMIN — INSULIN HUMAN 1 UNITS: 100 INJECTION, SOLUTION PARENTERAL at 01:01

## 2022-04-21 RX ADMIN — ENOXAPARIN SODIUM 40 MG: 40 INJECTION SUBCUTANEOUS at 08:29

## 2022-04-21 RX ADMIN — HYDROMORPHONE HYDROCHLORIDE 0.5 MG: 2 INJECTION, SOLUTION INTRAMUSCULAR; INTRAVENOUS; SUBCUTANEOUS at 14:28

## 2022-04-21 RX ADMIN — HYDROMORPHONE HYDROCHLORIDE 0.5 MG: 1 INJECTION, SOLUTION INTRAMUSCULAR; INTRAVENOUS; SUBCUTANEOUS at 05:02

## 2022-04-21 RX ADMIN — HYDROMORPHONE HYDROCHLORIDE 0.5 MG: 2 INJECTION, SOLUTION INTRAMUSCULAR; INTRAVENOUS; SUBCUTANEOUS at 14:43

## 2022-04-21 RX ADMIN — POTASSIUM CHLORIDE, DEXTROSE MONOHYDRATE AND SODIUM CHLORIDE 100 ML/HR: 150; 5; 900 INJECTION, SOLUTION INTRAVENOUS at 06:03

## 2022-04-21 RX ADMIN — Medication 10 MG: at 13:08

## 2022-04-21 NOTE — PROGRESS NOTES
The patient's NG tube came out around 0430, Dr. Maria Kwon notified, a new NG tube was placed and KUB confirmed placement. Once confirmed, the RN went to reinforce the drsg and it slid out a few cm the tube was replaced again, Dr. Larissa Hsieh notified, order for reverification placed.

## 2022-04-21 NOTE — BRIEF OP NOTE
BRIEF OPERATIVE NOTE    Date of Procedure:  4/21/2022    Preoperative Diagnosis: SBO  Postoperative Diagnosis: same    Procedure:  Procedure(s) with comments:  LAPAROTOMY EXPLORATORY POSSIBLE BOWEL RESECTION - REDUCTION OF INTERNAL HERNIA  Surgeon(s) and Role:     * Isaias Warren MD - Primary  Anesthesia: General  Estimated Blood Loss: <30cc  Specimens: none  Findings: see op note   Complications: none  Implants: none

## 2022-04-21 NOTE — PROGRESS NOTES
Problem: Falls - Risk of  Goal: *Absence of Falls  Description: Document Antoine Sites Fall Risk and appropriate interventions in the flowsheet.   4/21/2022 0252 by Rajesh Aguero RN  Outcome: Progressing Towards Goal  Note: Fall Risk Interventions:            Medication Interventions: Patient to call before getting OOB,Teach patient to arise slowly                4/21/2022 0252 by Rajesh Aguero RN  Outcome: Progressing Towards Goal  Note: Fall Risk Interventions:            Medication Interventions: Patient to call before getting OOB,Teach patient to arise slowly                   Problem: Patient Education: Go to Patient Education Activity  Goal: Patient/Family Education  Outcome: Progressing Towards Goal     Problem: Small Bowel Obstruction: Day 1  Goal: Nutrition/Diet  Outcome: Progressing Towards Goal  Goal: *Optimal pain control at patient's stated goal  Outcome: Progressing Towards Goal

## 2022-04-21 NOTE — PERIOP NOTES
TRANSFER - OUT REPORT:    Verbal report given to receiving nurse(name) on Carie Penaloza. being transferred to [facility](unit) for routine progression of care       Report consisted of patient's Situation, Background, Assessment and   Recommendations(SBAR). Information from the following report(s) OR Summary, Procedure Summary, Intake/Output and MAR was reviewed with the receiving nurse. Opportunity for questions and clarification was provided.       Patient transported with:   [employer]

## 2022-04-21 NOTE — CONSULTS
H&P/Consult Note/Progress Note/Office Note:   Carie Jeffery MRN: [id number]  PRS:1/79/1837  Age:71 y.o.    HPI: Carie Jeffery is a 80 y.o. male who was admitted by the hospitalist after he came to the ER on 4/20/22 with a 2-day history of diffuse, constant, progressive, severe abdominal pain. Nothing in particular made his pain better or worse. He had associated nausea but no vomiting  No recent abdominal trauma reported  He is s/p Nissen fundoplication performed in 2006 by Dr. Patricia Rodriguez at an outside hospital and reports he has been unable to vomit since then. He is s/p laparoscopic cholecystectomy 2006 by Dr. Patricia Rodriguez  He is s/p open appendectomy 1969. He is s/p colonoscopy in 2019 and reports this was normal with his next planned in 5 years. He had leukocytosis in the ER and a CT scan as shown below with a high-grade small bowel obstruction. An NG tube was placed to decompression and he was given IV fluids and bowel rest without improvement in his abdominal pain  Surgery was consulted by Dr Nereida Blandon and Dr Alen Shirley      4/20/22 CT abd/pelvis with oral and IV contrast  LOWER CHEST: Unremarkable. ABDOMEN AND PELVIS:   Liver: Unremarkable. Biliary system: Cholecystectomy.     Pancreas: Unremarkable. Spleen: Unremarkable. Adrenals: Unremarkable.     Genitourinary: Symmetric renal enhancement. No hydronephrosis or ureteral calculus. Urinary bladder decompressed. Reproductive organs: Prostate gland is enlarged. Gastrointestinal tract: Multiple dilated loops of small bowel present throughout the abdomen. There is abrupt transition to decompressed bowel involving the proximal ileum within the right lower quadrant (3, 62). Peritoneum/Extraperitoneum: Unremarkable. Small amount of abdominopelvic ascites. Vascular: Unremarkable   Musculoskeletal:  Degenerative changes of thoracolumbar spine. No acute or aggressive osseous lesion.        IMPRESSION:   Findings consistent with high-grade small bowel obstruction with transition point within the right upper quadrant.             Past Medical History:   Diagnosis Date    Arthritis     Chronic coronary artery disease     Gastroesophageal reflux disease     Gout     Hypercholesterolemia     Hypertension     Malignant melanoma     Papillary thyroid carcinoma, multifocal (3 tumors in the left lobe, largest tumor 0.9 cm), status post thyroidectomy/central lymph node dissection 5/26/2015 and 54.5 mCi iodine-131 7/22/2015     Postsurgical hypothyroidism      Past Surgical History:   Procedure Laterality Date    HX APPENDECTOMY  1969    HX CHOLECYSTECTOMY  2005    HX CORONARY ARTERY BYPASS GRAFT  08/19/2016    Dr. Emeka Kbaa at [address] [facility]  2010    HX GI  2008    Nissen fundoplication    HX KNEE ARTHROSCOPY Right 2005    HX MALIGNANT SKIN LESION EXCISION  2011    melanoma removed from ear lobe    HX THYROIDECTOMY  5/26/2015    including central lymph node dissection    HX TONSILLECTOMY  Age 4     Current Facility-Administered Medications   Medication Dose Route Frequency    insulin glargine (LANTUS) injection 5 Units  5 Units SubCUTAneous DAILY    ceFAZolin (ANCEF) 2 g/20 mL in sterile water IV syringe  2 g IntraVENous ON CALL TO OR    sodium chloride (NS) flush 5-40 mL  5-40 mL IntraVENous Q8H    sodium chloride (NS) flush 5-40 mL  5-40 mL IntraVENous PRN    sodium chloride (NS) flush 5-40 mL  5-40 mL IntraVENous Q8H    sodium chloride (NS) flush 5-40 mL  5-40 mL IntraVENous PRN    acetaminophen (TYLENOL) tablet 650 mg  650 mg Oral Q6H PRN    Or    acetaminophen (TYLENOL) suppository 650 mg  650 mg Rectal Q6H PRN    polyethylene glycol (MIRALAX) packet 17 g  17 g Oral DAILY PRN    ondansetron (ZOFRAN ODT) tablet 4 mg  4 mg Oral Q8H PRN    Or    ondansetron (ZOFRAN) injection 4 mg  4 mg IntraVENous Q6H PRN    enoxaparin (LOVENOX) injection 40 mg  40 mg SubCUTAneous DAILY    piperacillin-tazobactam (ZOSYN) 3.375 g in 0.9% sodium chloride (MBP/ADV) 100 mL MBP  3.375 g IntraVENous Q8H    metoprolol (LOPRESSOR) injection 2.5 mg  2.5 mg IntraVENous Q8H    dextrose 5% - 0.9% NaCl with KCl 20 mEq/L infusion  100 mL/hr IntraVENous CONTINUOUS    glucose chewable tablet 16 g  16 g Oral PRN    glucagon (GLUCAGEN) injection 1 mg  1 mg IntraMUSCular PRN    dextrose 10% infusion 125-250 mL  125-250 mL IntraVENous PRN    insulin regular (NOVOLIN R, HUMULIN R) injection   SubCUTAneous Q6H    famotidine (PF) (PEPCID) 20 mg in 0.9% sodium chloride 10 mL injection  20 mg IntraVENous DAILY    HYDROmorphone (DILAUDID) injection 0.5 mg  0.5 mg IntraVENous Q4H PRN    naloxone (NARCAN) injection 0.2 mg  0.2 mg IntraVENous EVERY 2 MINUTES AS NEEDED     Patient has no known allergies. Social History     Socioeconomic History    Marital status:    Tobacco Use    Smoking status: Never Smoker    Smokeless tobacco: Never Used   Substance and Sexual Activity    Alcohol use: No    Drug use: No     Social History     Tobacco Use   Smoking Status Never Smoker   Smokeless Tobacco Never Used     Family History   Problem Relation Age of Onset    Cancer Mother         Lung    Arthritis-rheumatoid Father     Coronary Art Dis Father     Cancer Brother         Melanoma    Thyroid Cancer Neg Hx      ROS: The patient has no difficulty with chest pain or shortness of breath. No fever or chills. Comprehensive review of systems was otherwise unremarkable except as noted above.     Physical Exam:   Visit Vitals  BP (!) 169/82   Pulse 100   Temp 98.4 °F (36.9 °C)   Resp 18   Ht 5' 8\" (1.727 m)   Wt 178 lb (80.7 kg)   SpO2 98%   BMI 27.06 kg/m²     Vitals:    04/21/22 0639 04/21/22 0815 04/21/22 1032 04/21/22 1130   BP: (!) 176/85 (!) 169/82 (!) 183/87 (!) 169/82   Pulse: 90  100    Resp: 18  18    Temp: 97.8 °F (36.6 °C)  98.4 °F (36.9 °C)    SpO2: 98%  98%    Weight:       Height:         No intake/output data recorded. 04/19 1901 - 04/21 0700  In: 1013 [I.V.:1013]  Out: 100     Constitutional: Alert, oriented, cooperative patient in no acute distress; appears stated age    Eyes:Sclera are clear. EOMs intact  ENMT: no external lesions gross hearing normal; no obvious neck masses, no ear or lip lesions, nares normal, +NGT with bilious output  CV: RRR. Normal perfusion  Resp: No JVD. Breathing is  non-labored; no audible wheezing. GI: Fairly flat but significantly tender diffusely      Musculoskeletal: unremarkable with normal function. No embolic signs or cyanosis. Neuro:  Oriented; moves all 4; no focal deficits  Psychiatric: normal affect and mood, no memory impairment    Recent vitals (if inpt):  Patient Vitals for the past 24 hrs:   BP Temp Pulse Resp SpO2 Height Weight   04/21/22 1130 (!) 169/82         04/21/22 1032 (!) 183/87 98.4 °F (36.9 °C) 100 18 98 %     04/21/22 0815 (!) 169/82         04/21/22 0639 (!) 176/85 97.8 °F (36.6 °C) 90 18 98 %     04/21/22 0323 (!) 174/80 98.7 °F (37.1 °C) 99 16 98 %     04/21/22 0002 (!) 171/82 98.6 °F (37 °C) 82 16 98 %     04/20/22 2157 (!) 175/82  94       04/20/22 2009 (!) 168/84 97.9 °F (36.6 °C) 95 16 96 %     04/20/22 1918 (!) 181/75  92  98 %     04/20/22 1800 (!) 181/90  87  99 %     04/20/22 1641 (!) 146/68 98.4 °F (36.9 °C) 95 20 91 % 5' 8\" (1.727 m) 178 lb (80.7 kg)       Amount and/or Complexity of Data Reviewed and Analyzed:  I reviewed and analyzed all of the unique labs and radiologic studies that are shown below as well as any that are in the HPI, and any that are in the expanded problem list below  *Each unique test, order, or document contributes to the combination of 2 or combination of 3 in Category 1 below. For this visit I also reviewed old records and prior notes.       Recent Labs     04/21/22  0555 04/20/22  1708 04/20/22  1708   WBC 16.7*   < > 19.3*   HGB 16.6   < > 16.8      < > 260    < > 137   K 4.3   < > 4.0      < > 102   CO2 27   < > 26   BUN 29*   < > 22   CREA 1.49   < > 1.34   *   < > 188*   TBILI 1.7*   < > 1.1   *   < > 55      < > 104   LPSE  --   --  71*    < > = values in this interval not displayed. Review of most recent CBC  Lab Results   Component Value Date/Time    WBC 16.7 (H) 04/21/2022 05:55 AM    HGB 16.6 04/21/2022 05:55 AM    HCT 48.2 04/21/2022 05:55 AM    PLATELET 805 67/43/7884 05:55 AM    MCV 93.6 04/21/2022 05:55 AM       Review of most recent BMP  Lab Results   Component Value Date/Time    Sodium 138 04/21/2022 05:55 AM    Potassium 4.3 04/21/2022 05:55 AM    Chloride 106 04/21/2022 05:55 AM    CO2 27 04/21/2022 05:55 AM    Anion gap 5 (L) 04/21/2022 05:55 AM    Glucose 203 (H) 04/21/2022 05:55 AM    BUN 29 (H) 04/21/2022 05:55 AM    Creatinine 1.49 04/21/2022 05:55 AM    GFR est AA 60 (L) 04/21/2022 05:55 AM    GFR est non-AA 50 (L) 04/21/2022 05:55 AM    Calcium 9.3 04/21/2022 05:55 AM       Review of most recent LFTs (and lipase if done)  Lab Results   Component Value Date/Time    ALT (SGPT) 395 (H) 04/21/2022 05:55 AM    AST (SGOT) 213 (H) 04/21/2022 05:55 AM    Alk. phosphatase 115 04/21/2022 05:55 AM    Bilirubin, total 1.7 (H) 04/21/2022 05:55 AM     Lab Results   Component Value Date/Time    Lipase 71 (L) 04/20/2022 05:08 PM       No results found for: INR, APTT, CBIL, LCAD, NH4, TROPT, TROIQ, INREXT, INREXT    Review of most recent HgbA1c  Lab Results   Component Value Date/Time    Hemoglobin A1c 5.6 04/20/2022 05:08 PM       Nutritional assessment screen for wound healing issues:  Lab Results   Component Value Date/Time    Protein, total 6.7 04/21/2022 05:55 AM    Albumin 3.1 (L) 04/21/2022 05:55 AM       @lastcovr@  XR Results (most recent):  Results from Hospital Encounter encounter on 04/20/22    XR ABD (KUB)    Narrative  KUB    INDICATION: Nasogastric tube placement    A supine view of the abdomen was obtained. The tip of the nasogastric tube is  in the stomach. There is stable small bowel distention suggesting a small bowel  obstruction    Impression  1. Tip of the NG tube is in the stomach. 2.  Dilated small bowel concerning for small bowel obstruction      CT Results (most recent):  Results from Hospital Encounter encounter on 08    [address] [facility]    COMPUTED TOMOGRAPHY    NAME: Tez Human  PT : 1950               SEX: M         MR#: [id number]  LOCATION/NS: US-                 AGE: 46J      ACCT: [de-identified]  ORDERING: MERLENE LOVE            PT TYPE: Radha Hardy  RADIOLOGIST: Bryanna Souza ([id number])  Final Report      ICD Codes / Adm. Diagnosis:                  /  Examination:  CT CARDIAC OVER READ  - [id number] - 2008  9:37AM      CT CHEST, CARDIAC CT OVER READ, 2008    Reason:    REPORT:    TECHNIQUE: Helically acquired images were obtained from just above the  aortic architectural to the lower hemithoraces reconstructed at 2.5 mm  intervals after intravenous contrast.  No prior studies are available for  comparison. FINDINGS: There is no pericardial effusion. No pleural fluid is identified  in the visualized portions of the thorax. Incidental note is made of the  origin of the left vertebral artery from the aortic arch. The heart and  great vessels are otherwise unremarkable. There are coronary artery  calcifications will be described in dedicated coronary artery CTA report. The visualized portions of the lungs are clear with the exception of a tiny  granuloma within the right lower lobe measuring 2 mm. No adenopathy is seen within the thorax. IMPRESSION:  1. TINY RIGHT LOWER LOBE GRANULOMA. 2. CORONARY ARTERY CALCIFICATIONS.     Interpreting/Reading Doctor: Bryanna Souza ([id number])  Transcribed: ZACHARIAH on 2008  Approved: Bryanna Souza ([id number])  2008        Distribution: Attending Doctor: Tanisha Zavala Results (most recent):  Results from [facility] Encounter encounter on 08    [address]  [facility]    ULTRASOUND    NAME: Jeremi Maloney  PT : 1950               SEX: M         MR#: [id number]  LOCATION/NS: US-                 AGE: 67M      ACCT: [de-identified]  ORDERING: MERLENE LOVE            PT TYPE: Roxmomo Shepard  Noah Carrillo ([id number])  Final Report      ICD Codes / Adm. Diagnosis:                  /  Examination:  CAROTID ULTRASOUND  - [id number] - 2008  9:36AM      CAROTID ULTRASOUND, 08:    Reason:   Coronary artery disease, hypertension. 272.4. REPORT:  Right and left common, internal and external carotid artery peak  systolic velocities were all within normal limits and normal ratios were  demonstrated. Right carotid bifurcation demonstrates very mild soft plaque  within the proximal internal carotid artery without any significant  narrowing. Doppler waveform tracings are normal in the right common,  internal carotid arteries, as well as vertebral artery which demonstrate  antegrade flow. On the left, there is no suspicious atheromatous disease and normal Doppler  tracings are evident. Left vertebral artery demonstrates antegrade flow. IMPRESSION:    EXTREMELY MINIMAL ATHEROMATOUS DISEASE AT THE RIGHT CAROTID  BIFURCATION WITHOUT NARROWING.  OTHERWISE, NORMAL.     Interpreting/Reading Doctor: Vy Head ([id number])  Transcribed: CP on 2008  Approved: Vy Head ([id number])  2008        Distribution:  Attending Doctor: Alisha Reyna        Admission date (for inpatients): 2022   * No surgery found *  Procedure(s):  LAPAROTOMY EXPLORATORY POSSIBLE BOWEL RESECTION        ASSESSMENT/PLAN:  Problem List  Date Reviewed: 2021          Codes Class Noted    Leukocytosis ICD-10-CM: S18.549  ICD-9-CM: 288.60  2022 Generalized abdominal pain ICD-10-CM: R10.84  ICD-9-CM: 789.07  4/21/2022        * (Principal) SBO (small bowel obstruction) ([facility] ICD-10-CM: K56.609  ICD-9-CM: 560.9  4/20/2022        Postsurgical hypothyroidism ICD-10-CM: E89.0  ICD-9-CM: 244.0  Unknown        Papillary thyroid carcinoma, multifocal (3 tumors in the left lobe, largest tumor 0.9 cm), status post thyroidectomy/central lymph node dissection 5/26/2015 and 54.5 mCi iodine-131 7/22/2015 ICD-10-CM: C73  ICD-9-CM: 433  6/21/2016        GERD (gastroesophageal reflux disease) ICD-10-CM: K21.9  ICD-9-CM: 530.81  Unknown        Malignant melanoma ([facility] ICD-10-CM: C43.9  ICD-9-CM: 172.9  Unknown        Arthritis ICD-10-CM: M19.90  ICD-9-CM: 716.90  Unknown        Chronic coronary artery disease ICD-10-CM: I25.10  ICD-9-CM: 414.00  9/3/2015    Overview Signed 6/21/2016  8:29 AM by Marybeth JOHNS     Overview:   BMS RCA 2008; NORMAL STRESS TEST 2014    Last Assessment & Plan:   He does not have any anginal quality chest pain. He occasionally has sharp atypical pain. His stress test was normal less than a year ago. I don't plan any further investigation. Dyslipidemia ICD-10-CM: E78.5  ICD-9-CM: 272.4  9/3/2015    Overview Signed 6/21/2016  8:29 AM by Marybeth JOHNS     Overview:   STATIN INTOLERANT    Last Assessment & Plan:   He is statin intolerant but he is unable to tolerate red yeast rice and his total cholesterol is actually less than 200. We discussed the injectables but I don't think he is an appropriate candidate for that right now. He is doing pretty well just on Red yeast rice             Hypertension ICD-10-CM: I10  ICD-9-CM: 401.9  9/3/2015    Overview Signed 6/21/2016  8:29 AM by Dominga Miller. Paul Condon 59:   Blood pressure is adequately controlled.              Dysphonia ICD-10-CM: R49.0  ICD-9-CM: 784.42  8/28/2015            Principal Problem:    SBO (small bowel obstruction) ([facility] (4/20/2022)    Active Problems: Chronic coronary artery disease (9/3/2015)      Overview: Overview:       BMS RCA 2008; NORMAL STRESS TEST 2014            Last Assessment & Plan:       He does not have any anginal quality chest pain. He occasionally has sharp       atypical pain. His stress test was normal less than a year ago. I don't       plan any further investigation. Papillary thyroid carcinoma, multifocal (3 tumors in the left lobe, largest tumor 0.9 cm), status post thyroidectomy/central lymph node dissection 5/26/2015 and 54.5 mCi iodine-131 7/22/2015 (6/21/2016)      GERD (gastroesophageal reflux disease) ()      Postsurgical hypothyroidism ()      Leukocytosis (4/21/2022)      Generalized abdominal pain (4/21/2022)           Number and Complexity of Problems addressed and   Risks of complications and/or morbidity of management      High-grade small bowel obstruction by CT with leukocytosis and persistent abdominal pain despite NG decompression  Informed consent was obtained for exploratory laparotomy. Procedure risks were discussed including bleeding, infection, recurrent obstruction, the need for bowel resection, fistula, abscess, blood clots, risk of anesthesia, etc.. All his questions were answered. He is in favor proceeding. The OR has been notified    We will proceed when an OR room is available. Level of MDM (2/3 elements below)  Number and Complexity of Problems Addressed Amount and/or Complexity of Data to be Reviewed and Analyzed  *Each unique test, order, or document contributes to the combination of 2 or combination of 3 in Category 1 below.  Risk of Complications and/or Morbidity or Mortality of pt Management     46744  74598 SF Minimal  1self-limited or minor problem Minimal or none Minimal risk of morbidity from additional diagnostic testing or Rx   20457  75537 Low Low  2or more self-limited or minor problems;    or  1stable chronic illness;    or  8EQZHK, uncomplicated illness or injury Limited  (Must meet the requirements of at least 1 of the 2 categories)  Category 1: Tests and documents   Any combination of 2 from the following:  Review of prior external note(s) from each unique source*;  review of the result(s) of each unique test*;   ordering of each unique test*    or   Category 2: Assessment requiring an independent historian(s)  (For the categories of independent interpretation of tests and discussion of management or test interpretation, see moderate or high) Low risk of morbidity from additional diagnostic testing or treatment     14639  76609 Mod Moderate  1or more chronic illnesses with exacerbation, progression, or side effects of treatment;    or  2or more stable chronic illnesses;    or  1undiagnosed new problem with uncertain prognosis;    or  1acute illness with systemic symptoms;    or  7YHEDG complicated injury   Moderate  (Must meet the requirements of at least 1 out of 3 categories)  Category 1: Tests, documents, or independent historian(s)  Any combination of 3 from the following:   Review of prior external note(s) from each unique source*;  Review of the result(s) of each unique test*;  Ordering of each unique test*;  Assessment requiring an independent historian(s)    or  Category 2: Independent interpretation of tests   Independent interpretation of a test performed by another physician/other qualified health care professional (not separately reported);     or  Category 3: Discussion of management or test interpretation  Discussion of management or test interpretation with external physician/other qualified health care professional/appropriate source (not separately reported)   Moderate risk of morbidity from additional diagnostic testing or treatment  Examples only:  Prescription drug management   Decision regarding minor surgery with identified patient or procedure risk factors  Decision regarding elective major surgery without identified patient or procedure risk factors   Diagnosis or treatment significantly limited by social determinants of health       92075 71405 High High  1or more chronic illnesses with severe exacerbation, progression, or side effects of treatment;    or  1 acute or chronic illness or injury that poses a threat to life or bodily function   Extensive  (Must meet the requirements of at least 2 out of 3 categories)  Category 1: Tests, documents, or independent historian(s)  Any combination of 3 from the following:   Review of prior external note(s) from each unique source*;  Review of the result(s) of each unique test*;   Ordering of each unique test*;   Assessment requiring an independent historian(s)    or   Category 2: Independent interpretation of tests   Independent interpretation of a test performed by another physician/other qualified health care professional (not separately reported);     or  Category 3: Discussion of management or test interpretation  Discussion of management or test interpretation with external physician/other qualified health care professional/appropriate source (not separately reported)   High risk of morbidity from additional diagnostic testing or treatment  Examples only:  Drug therapy requiring intensive monitoring for toxicity  Decision regarding elective major surgery with identified patient or procedure risk factors  Decision regarding emergency major surgery  Decision regarding hospitalization  Decision not to resuscitate or to de-escalate care because of poor prognosis             I have personally performed a face-to-face diagnostic evaluation and management  service on this patient. I have independently seen the patient. I have independently obtained the above history from the patient/family. I have independently examined the patient with above findings. I have independently reviewed data/labs for this patient and developed the above plan of care (MDM). Signed: Pepe Torres. Simona Tim MD, FACS

## 2022-04-21 NOTE — ANESTHESIA PREPROCEDURE EVALUATION
Relevant Problems   CARDIOVASCULAR   (+) Chronic coronary artery disease   (+) Hypertension      GASTROINTESTINAL   (+) GERD (gastroesophageal reflux disease)      ENDOCRINE   (+) Arthritis   (+) Postsurgical hypothyroidism      PERSONAL HX & FAMILY HX OF CANCER   (+) Malignant melanoma (HCC)   (+) Papillary thyroid carcinoma, multifocal (3 tumors in the left lobe, largest tumor 0.9 cm), status post thyroidectomy/central lymph node dissection 5/26/2015 and 54.5 mCi iodine-131 7/22/2015       Anesthetic History   No history of anesthetic complications            Review of Systems / Medical History  Patient summary reviewed, nursing notes reviewed and pertinent labs reviewed    Pulmonary  Within defined limits                 Neuro/Psych   Within defined limits           Cardiovascular    Hypertension: well controlled          CABG    Exercise tolerance: >4 METS     GI/Hepatic/Renal     GERD: well controlled           Endo/Other      Hypothyroidism: well controlled       Other Findings   Comments: SBO         Physical Exam    Airway  Mallampati: II  TM Distance: 4 - 6 cm  Neck ROM: normal range of motion   Mouth opening: Normal     Cardiovascular  Regular rate and rhythm,  S1 and S2 normal,  no murmur, click, rub, or gallop             Dental  No notable dental hx       Pulmonary  Breath sounds clear to auscultation               Abdominal         Other Findings            Anesthetic Plan    ASA: 3, emergent  Anesthesia type: general          Induction: RSI  Anesthetic plan and risks discussed with: Patient      NG in place

## 2022-04-21 NOTE — ANESTHESIA POSTPROCEDURE EVALUATION
Procedure(s):  LAPAROTOMY EXPLORATORY POSSIBLE BOWEL RESECTION.     general    Anesthesia Post Evaluation      Multimodal analgesia: multimodal analgesia used between 6 hours prior to anesthesia start to PACU discharge  Patient location during evaluation: PACU  Patient participation: complete - patient participated  Level of consciousness: awake  Pain management: adequate  Airway patency: patent  Anesthetic complications: no  Cardiovascular status: acceptable  Respiratory status: acceptable, spontaneous ventilation and nonlabored ventilation  Hydration status: acceptable  Post anesthesia nausea and vomiting:  none      INITIAL Post-op Vital signs:   Vitals Value Taken Time   /79 04/21/22 1433   Temp 37.1 °C (98.7 °F) 04/21/22 1406   Pulse 99 04/21/22 1433   Resp 16 04/21/22 1433   SpO2 94 % 04/21/22 1433

## 2022-04-21 NOTE — PROGRESS NOTES
Nutrition    Received referral from [address] [place] [address] for Malnutrition Screening Tool: Recently Lost Weight Without Trying: Yes, If Yes, How Much Weight Loss: 24 - 33 lbs, Eating Poorly Due to Decreased Appetite: No    Patient reports all weight loss in the past 2 years was intentional    Therefore this does not meet criteria for referral.  F/U per nutrition standard of care.     Medina Hardwick, [address] [place] [address], [address], [address] [place] [address]

## 2022-04-21 NOTE — PROGRESS NOTES
PT/OT Note: Spoke with RN and MD. In agreement to hold therapies til after general sx assess patient. Will re attempt tomorrow as schedule allows.      Dung Godinez, OT

## 2022-04-21 NOTE — PROGRESS NOTES
TRANSFER - IN REPORT:    Verbal report received from Medina Magdaleno RN (name) on [facility].  being received from PACU (unit) for routine progression of care. Report consisted of patients Situation, Background, Assessment and Recommendations(SBAR). Information from the following report(s) OR Summary was reviewed with the receiving nurse. Opportunity for questions and clarification was provided.

## 2022-04-21 NOTE — PROGRESS NOTES
Hospitalist Progress Note   Admit Date:  2022  4:44 PM   Name:  Yuko Reynoso. Age:  80 y.o. Sex:  male  :  1950   MRN:  [id number]   [place]  [facility]    Presenting Complaint: Abdominal Pain and Abnormal Lab Results    Reason(s) for Admission: SBO (small bowel obstruction) [facility] Course & Interval History:   Yuko Staley is a 80 y.o. male with medical history of GERD s/p nissen fundoplication , thyroid CA s/p resection, HTN, CAD who presented with epigastrict abdominal pain and RUQ pain that started around 4pm yesterday. Has not passed gas or had a BM since onset of symptoms. He had an outpatient CT today showing high grade SBO @ [facility] but wanted to be admitted at [facility] so he drove here. Labs significant for WBC 19K with neutrophile predominance. rec'd zosyn in ED. General surgery was notified and requested hospitalist admission. Patient in severe discomfort on my eval. Family at bedside. No PO intake > 24 hours    He is s/p Nissen fundoplication performed in  by Dr. Lance Miles at an outside hospital and reports he has been unable to vomit since then. He is s/p laparoscopic cholecystectomy  by Dr. Lance Miles  He is s/p open appendectomy . He is s/p colonoscopy in 2019 and reports this was normal with his next planned in 5 years.     OSH CT a/p - Findings consistent with high-grade small bowel obstruction with transition point within the right upper quadrant. Subjective/24hr Events (22): Accidentally pulled NGT out ON. Replaced. Pain severe, improves with dilaudid. No BM or flatus. LFTs elevated. Seen prior to transport for ex lap     ROS:  10 systems reviewed and negative except as noted above. Assessment & Plan:     High grade SBO with SIRS - leukocytosis mildly improved. no improvement with decompression and bowel rest. Emergent ex lap today. Cont. IVF, Zosyn, NGT to LIS, NPO pending surgical outcome.       Acute liver injury - new transaminitis. Etiology unclear. Perhaps related to above and/or early sepsis. Check RUQ US. Trend CMP. Lab Results   Component Value Date/Time    ALT (SGPT) 395 (H) 04/21/2022 05:55 AM    AST (SGOT) 213 (H) 04/21/2022 05:55 AM    Alk. phosphatase 115 04/21/2022 05:55 AM    Bilirubin, total 1.7 (H) 04/21/2022 05:55 AM       GINNY 2/2 ATN - worsening. baseline Scr 1.0. cont. IVF, supportive care. Lab Results   Component Value Date/Time    Creatinine 1.49 04/21/2022 05:55 AM     Hyperglycemia - 2/2 severe illness, fasting  on admission. a1c 5.6%. start lantus 5 U daily while on dextrose containing fluids while NPO     Postsurgical hypothyroidism - hold hormone replacement at this time due to NPO state. pendin gclinical course will start IV      HTN - chronically on BB. Cont metoprolol 2.5 q8h IV to prevent rebound tachycardia      CAD - resume BB as above. Hold other PO meds for now. Chronic coronary artery disease       Papillary thyroid carcinoma, multifocal (3 tumors in the left lobe, largest tumor 0.9 cm), status post thyroidectomy/central lymph node dissection 5/26/2015 and 54.5 mCi iodine-131     - follows with endocrinology outpatient.        GERD s/p buzz fundoplication - IV pepcid            Discharge Planning:      Not stable     Diet:  DIET NPO  DVT PPx: per surgery   Code status: Full Code    Hospital Problems as of 4/21/2022 Date Reviewed: 12/14/2021          Codes Class Noted - Resolved POA    Leukocytosis ICD-10-CM: D72.829  ICD-9-CM: 288.60  4/21/2022 - Present Yes        Generalized abdominal pain ICD-10-CM: R10.84  ICD-9-CM: 789.07  4/21/2022 - Present Yes        Transaminitis ICD-10-CM: R74.01  ICD-9-CM: 790.4  4/21/2022 - Present No        Acute kidney injury (GINNY) with acute tubular necrosis (ATN) ([facility] ICD-10-CM: N17.0  ICD-9-CM: 584.5  4/21/2022 - Present Yes        * (Principal) [facility] ICD-10-CM: M03.721  ICD-9-CM: 560.9  4/20/2022 - Present Yes    Overview Signed 4/21/2022  1:07 PM by Fabio Welch MD     4/21/22 s/p expl lap for SBO; Dr Isrrael Waletrs             Postsurgical hypothyroidism ICD-10-CM: E89.0  ICD-9-CM: 244.0  Unknown - Present Yes        Papillary thyroid carcinoma, multifocal (3 tumors in the left lobe, largest tumor 0.9 cm), status post thyroidectomy/central lymph node dissection 5/26/2015 and 54.5 mCi iodine-131 7/22/2015 ICD-10-CM: C73  ICD-9-CM: 579  6/21/2016 - Present Yes        GERD (gastroesophageal reflux disease) ICD-10-CM: K21.9  ICD-9-CM: 530.81  Unknown - Present Yes        Chronic coronary artery disease ICD-10-CM: I25.10  ICD-9-CM: 414.00  9/3/2015 - Present Yes    Overview Signed 6/21/2016  8:29 AM by Gama JOHNS     Overview:   BMS RCA 2008; NORMAL STRESS TEST 2014    Last Assessment & Plan:   He does not have any anginal quality chest pain. He occasionally has sharp atypical pain. His stress test was normal less than a year ago. I don't plan any further investigation. Objective:     Patient Vitals for the past 24 hrs:   Temp Pulse Resp BP SpO2   04/21/22 1130    (!) 169/82    04/21/22 1032 98.4 °F (36.9 °C) 100 18 (!) 183/87 98 %   04/21/22 0815    (!) 169/82    04/21/22 0639 97.8 °F (36.6 °C) 90 18 (!) 176/85 98 %   04/21/22 0323 98.7 °F (37.1 °C) 99 16 (!) 174/80 98 %   04/21/22 0002 98.6 °F (37 °C) 82 16 (!) 171/82 98 %   04/20/22 2157  94  (!) 175/82    04/20/22 2009 97.9 °F (36.6 °C) 95 16 (!) 168/84 96 %   04/20/22 1918  92  (!) 181/75 98 %   04/20/22 1800  87  (!) 181/90 99 %   04/20/22 1641 98.4 °F (36.9 °C) 95 20 (!) 146/68 91 %     Oxygen Therapy  O2 Sat (%): 98 % (04/21/22 1032)  O2 Device: None (Room air) (04/21/22 0850)    Estimated body mass index is 27.06 kg/m² as calculated from the following:    Height as of this encounter: 5' 8\" (1.727 m). Weight as of this encounter: 80.7 kg (178 lb).     Intake/Output Summary (Last 24 hours) at 4/21/2022 1336  Last data filed at 4/21/2022 1336  Gross per 24 hour   Intake 2013 ml   Output 400 ml   Net 1613 ml         Physical Exam:     Blood pressure (!) 169/82, pulse 100, temperature 98.4 °F (36.9 °C), resp. rate 18, height 5' 8\" (1.727 m), weight 80.7 kg (178 lb), SpO2 98 %. General:    Ill appearing. In moderate distress   Head:  Normocephalic, atraumatic  Eyes:  Sclerae appear normal.  Pupils equally round. ENT:  Nares appear normal, no drainage. Moist oral mucosa  Neck:  No restricted ROM. Trachea midline   CV:   RRR. No m/r/g. No jugular venous distension. Lungs:   CTAB. No wheezing, rhonchi, or rales. Respirations even, unlabored  Abdomen: Bowel sounds absent. Distended. tender  Extremities: No cyanosis or clubbing. No edema  Skin:     No rashes and normal coloration. Warm and dry. Neuro:  CN II-XII grossly intact. Sensation intact. A&Ox3  Psych:  Normal mood and affect. I have reviewed ordered lab tests and independently visualized imaging below:    Recent Labs:  Recent Results (from the past 48 hour(s))   CBC WITH AUTOMATED DIFF    Collection Time: 04/20/22  5:08 PM   Result Value Ref Range    WBC 19.3 (H) 4.3 - 11.1 K/uL    RBC 5.23 4.23 - 5.6 M/uL    HGB 16.8 13.6 - 17.2 g/dL    HCT 48.5 41.1 - 50.3 %    MCV 92.7 79.6 - 97.8 FL    MCH 32.1 26.1 - 32.9 PG    MCHC 34.6 31.4 - 35.0 g/dL    RDW 13.6 11.9 - 14.6 %    PLATELET 514 242 - 658 K/uL    MPV 8.8 (L) 9.4 - 12.3 FL    ABSOLUTE NRBC 0.00 0.0 - 0.2 K/uL    DF AUTOMATED      NEUTROPHILS 90 (H) 43 - 78 %    LYMPHOCYTES 5 (L) 13 - 44 %    MONOCYTES 5 4.0 - 12.0 %    EOSINOPHILS 0 (L) 0.5 - 7.8 %    BASOPHILS 0 0.0 - 2.0 %    IMMATURE GRANULOCYTES 0 0.0 - 5.0 %    ABS. NEUTROPHILS 17.4 (H) 1.7 - 8.2 K/UL    ABS. LYMPHOCYTES 0.9 0.5 - 4.6 K/UL    ABS. MONOCYTES 0.9 0.1 - 1.3 K/UL    ABS. EOSINOPHILS 0.0 0.0 - 0.8 K/UL    ABS. BASOPHILS 0.0 0.0 - 0.2 K/UL    ABS. IMM.  GRANS. 0.1 0.0 - 0.5 K/UL   METABOLIC PANEL, COMPREHENSIVE    Collection Time: 04/20/22  5:08 PM   Result Value Ref Range    Sodium 137 136 - 145 mmol/L    Potassium 4.0 3.5 - 5.1 mmol/L    Chloride 102 98 - 107 mmol/L    CO2 26 21 - 32 mmol/L    Anion gap 9 7 - 16 mmol/L    Glucose 188 (H) 65 - 100 mg/dL    BUN 22 8 - 23 MG/DL    Creatinine 1.34 0.8 - 1.5 MG/DL    GFR est AA >60 >60 ml/min/1.73m2    GFR est non-AA 56 (L) >60 ml/min/1.73m2    Calcium 10.0 8.3 - 10.4 MG/DL    Bilirubin, total 1.1 0.2 - 1.1 MG/DL    ALT (SGPT) 55 12 - 65 U/L    AST (SGOT) 37 15 - 37 U/L    Alk. phosphatase 104 50 - 136 U/L    Protein, total 7.8 6.3 - 8.2 g/dL    Albumin 3.8 3.2 - 4.6 g/dL    Globulin 4.0 (H) 2.3 - 3.5 g/dL    A-G Ratio 1.0 (L) 1.2 - 3.5     LIPASE    Collection Time: 04/20/22  5:08 PM   Result Value Ref Range    Lipase 71 (L) 73 - 393 U/L   HEMOGLOBIN A1C WITH EAG    Collection Time: 04/20/22  5:08 PM   Result Value Ref Range    Hemoglobin A1c 5.6 4.20 - 6.30 %    Est. average glucose 114 mg/dL   LACTIC ACID    Collection Time: 04/20/22  5:58 PM   Result Value Ref Range    Lactic acid 1.5 0.4 - 2.0 MMOL/L   GLUCOSE, POC    Collection Time: 04/21/22 12:09 AM   Result Value Ref Range    Glucose (POC) 179 (H) 65 - 100 mg/dL    Performed by Satinder    METABOLIC PANEL, COMPREHENSIVE    Collection Time: 04/21/22  5:55 AM   Result Value Ref Range    Sodium 138 136 - 145 mmol/L    Potassium 4.3 3.5 - 5.1 mmol/L    Chloride 106 98 - 107 mmol/L    CO2 27 21 - 32 mmol/L    Anion gap 5 (L) 7 - 16 mmol/L    Glucose 203 (H) 65 - 100 mg/dL    BUN 29 (H) 8 - 23 MG/DL    Creatinine 1.49 0.8 - 1.5 MG/DL    GFR est AA 60 (L) >60 ml/min/1.73m2    GFR est non-AA 50 (L) >60 ml/min/1.73m2    Calcium 9.3 8.3 - 10.4 MG/DL    Bilirubin, total 1.7 (H) 0.2 - 1.1 MG/DL    ALT (SGPT) 395 (H) 12 - 65 U/L    AST (SGOT) 213 (H) 15 - 37 U/L    Alk.  phosphatase 115 50 - 136 U/L    Protein, total 6.7 6.3 - 8.2 g/dL    Albumin 3.1 (L) 3.2 - 4.6 g/dL    Globulin 3.6 (H) 2.3 - 3.5 g/dL    A-G Ratio 0.9 (L) 1.2 - 3.5 MAGNESIUM    Collection Time: 04/21/22  5:55 AM   Result Value Ref Range    Magnesium 2.1 1.8 - 2.4 mg/dL   LACTIC ACID    Collection Time: 04/21/22  5:55 AM   Result Value Ref Range    Lactic acid 1.9 0.4 - 2.0 MMOL/L   CBC WITH AUTOMATED DIFF    Collection Time: 04/21/22  5:55 AM   Result Value Ref Range    WBC 16.7 (H) 4.3 - 11.1 K/uL    RBC 5.15 4.23 - 5.6 M/uL    HGB 16.6 13.6 - 17.2 g/dL    HCT 48.2 41.1 - 50.3 %    MCV 93.6 79.6 - 97.8 FL    MCH 32.2 26.1 - 32.9 PG    MCHC 34.4 31.4 - 35.0 g/dL    RDW 13.9 11.9 - 14.6 %    PLATELET 545 949 - 482 K/uL    MPV 8.7 (L) 9.4 - 12.3 FL    ABSOLUTE NRBC 0.00 0.0 - 0.2 K/uL    DF AUTOMATED      NEUTROPHILS 89 (H) 43 - 78 %    LYMPHOCYTES 3 (L) 13 - 44 %    MONOCYTES 7 4.0 - 12.0 %    EOSINOPHILS 0 (L) 0.5 - 7.8 %    BASOPHILS 0 0.0 - 2.0 %    IMMATURE GRANULOCYTES 0 0.0 - 5.0 %    ABS. NEUTROPHILS 14.9 (H) 1.7 - 8.2 K/UL    ABS. LYMPHOCYTES 0.6 0.5 - 4.6 K/UL    ABS. MONOCYTES 1.1 0.1 - 1.3 K/UL    ABS. EOSINOPHILS 0.0 0.0 - 0.8 K/UL    ABS. BASOPHILS 0.1 0.0 - 0.2 K/UL    ABS. IMM. GRANS. 0.1 0.0 - 0.5 K/UL   GLUCOSE, POC    Collection Time: 04/21/22  6:12 AM   Result Value Ref Range    Glucose (POC) 215 (H) 65 - 100 mg/dL    Performed by Satinder    GLUCOSE, POC    Collection Time: 04/21/22 12:03 PM   Result Value Ref Range    Glucose (POC) 142 (H) 65 - 100 mg/dL    Performed by Kailey        All Micro Results     Procedure Component Value Units Date/Time    CULTURE, BLOOD [[id number]] Collected: 04/20/22 1907    Order Status: Completed Specimen: Blood Updated: 04/20/22 1910    CULTURE, BLOOD [[id number]] Collected: 04/20/22 1759    Order Status: Completed Specimen: Blood Updated: 04/20/22 1815          Other Studies:  XR ABD (KUB)    Result Date: 4/21/2022  KUB INDICATION: Nasogastric tube placement A supine view of the abdomen was obtained. The tip of the nasogastric tube is in the stomach.   There is stable small bowel distention suggesting a small bowel obstruction     1. Tip of the NG tube is in the stomach. 2.  Dilated small bowel concerning for small bowel obstruction    XR ABD (KUB)    Result Date: 4/21/2022  EXAMINATION: Abdomen x-ray HISTORY: NGT placement TECHNIQUE: Frontal abdomen. COMPARISON: No Comparison FINDINGS:  A nasogastric tube is in place. The tip lies below the hemidiaphragm over the gastric fundus. 1. Findings as described above. US ABD LTD    Result Date: 4/21/2022  Abdominal ultrasound, 4/21/2022 History: Elevated liver function tests. Comparison:  None  Findings: The liver is normal in size measuring 14.7 cm. No intrahepatic biliary ductal dilitation is seen. The liver appears heterogeneous and some contour nodularity is suggested. These can suggest cirrhosis. .  The main portal vein is patent and demonstrates hepatopedal flow. The gallbladder has been removed. The common bile duct is normal in caliber measuring 3.2 mm. The pancreas is not seen due to overlying bowel gas and therefore cannot be assessed. The right kidney measures 10.4 cm in length. No shadowing stones, or hydronephrosis is seen of the right kidney. Only increased parenchymal echogenicity is seen suggested chronic medical renal changes. The IVC is patent. The distal abdominal aorta is normal in caliber measuring 1.4 cm. The non-visualized portions of the aorta cannot be evaluated. Small upper abdominal ascites is seen. 1.  Heterogeneous liver with some questionable contour nodularity. These can be indicated to cirrhosis. 2.  No intra- or extrahepatic biliary ductal dilitation. 3.  Patent portal vein and IVC. Flow in the portal vein is hepatopedal in direction. 4. Small upper abdominal ascites.         Current Meds:  Current Facility-Administered Medications   Medication Dose Route Frequency    insulin glargine (LANTUS) injection 5 Units  5 Units SubCUTAneous DAILY    lidocaine (XYLOCAINE) 10 mg/mL (1 %) injection 0.1 mL  0.1 mL SubCUTAneous PRN    lactated Ringers infusion  100 mL/hr IntraVENous CONTINUOUS    fentaNYL citrate (PF) injection 100 mcg  100 mcg IntraVENous ONCE    midazolam (VERSED) injection 2 mg  2 mg IntraVENous ONCE    sodium chloride (NS) flush 5-40 mL  5-40 mL IntraVENous Q8H    sodium chloride (NS) flush 5-40 mL  5-40 mL IntraVENous PRN    sodium chloride (NS) flush 5-40 mL  5-40 mL IntraVENous Q8H    sodium chloride (NS) flush 5-40 mL  5-40 mL IntraVENous PRN    acetaminophen (TYLENOL) tablet 650 mg  650 mg Oral Q6H PRN    Or    acetaminophen (TYLENOL) suppository 650 mg  650 mg Rectal Q6H PRN    polyethylene glycol (MIRALAX) packet 17 g  17 g Oral DAILY PRN    ondansetron (ZOFRAN ODT) tablet 4 mg  4 mg Oral Q8H PRN    Or    ondansetron (ZOFRAN) injection 4 mg  4 mg IntraVENous Q6H PRN    enoxaparin (LOVENOX) injection 40 mg  40 mg SubCUTAneous DAILY    piperacillin-tazobactam (ZOSYN) 3.375 g in 0.9% sodium chloride (MBP/ADV) 100 mL MBP  3.375 g IntraVENous Q8H    metoprolol (LOPRESSOR) injection 2.5 mg  2.5 mg IntraVENous Q8H    dextrose 5% - 0.9% NaCl with KCl 20 mEq/L infusion  100 mL/hr IntraVENous CONTINUOUS    glucose chewable tablet 16 g  16 g Oral PRN    glucagon (GLUCAGEN) injection 1 mg  1 mg IntraMUSCular PRN    dextrose 10% infusion 125-250 mL  125-250 mL IntraVENous PRN    insulin regular (NOVOLIN R, HUMULIN R) injection   SubCUTAneous Q6H    famotidine (PF) (PEPCID) 20 mg in 0.9% sodium chloride 10 mL injection  20 mg IntraVENous DAILY    HYDROmorphone (DILAUDID) injection 0.5 mg  0.5 mg IntraVENous Q4H PRN    naloxone (NARCAN) injection 0.2 mg  0.2 mg IntraVENous EVERY 2 MINUTES AS NEEDED     Facility-Administered Medications Ordered in Other Encounters   Medication Dose Route Frequency    fentaNYL citrate (PF) injection   IntraVENous PRN    ePHEDrine in NS (PF) (MISTOLE) 10 mg/mL in NS syringe   IntraVENous PRN    PHENYLephrine 100 mcg/mL 10 mL syringe (ONE-STEP)   IntraVENous CONTINUOUS    rocuronium injection   IntraVENous PRN    ketamine (KETALAR) 50 mg/mL injection   IntraVENous PRN    lidocaine (PF) (XYLOCAINE) 20 mg/mL (2 %) injection   IntraVENous PRN    propofoL (DIPRIVAN) 10 mg/mL injection   IntraVENous PRN    succinylcholine (ANECTINE) injection   IntraVENous PRN    lactated Ringers infusion   IntraVENous CONTINUOUS       Signed:  Anjali Rizvi, DO    Part of this note may have been written by using a voice dictation software. The note has been proof read but may still contain some grammatical/other typographical errors.

## 2022-04-22 LAB
ANION GAP SERPL CALC-SCNC: 4 MMOL/L (ref 7–16)
BUN SERPL-MCNC: 24 MG/DL (ref 8–23)
CALCIUM SERPL-MCNC: 8.5 MG/DL (ref 8.3–10.4)
CHLORIDE SERPL-SCNC: 112 MMOL/L (ref 98–107)
CO2 SERPL-SCNC: 28 MMOL/L (ref 21–32)
CREAT SERPL-MCNC: 1.41 MG/DL (ref 0.8–1.5)
ERYTHROCYTE [DISTWIDTH] IN BLOOD BY AUTOMATED COUNT: 14.4 % (ref 11.9–14.6)
GLUCOSE BLD STRIP.AUTO-MCNC: 113 MG/DL (ref 65–100)
GLUCOSE BLD STRIP.AUTO-MCNC: 122 MG/DL (ref 65–100)
GLUCOSE BLD STRIP.AUTO-MCNC: 148 MG/DL (ref 65–100)
GLUCOSE SERPL-MCNC: 142 MG/DL (ref 65–100)
HCT VFR BLD AUTO: 41.1 % (ref 41.1–50.3)
HGB BLD-MCNC: 13.4 G/DL (ref 13.6–17.2)
MCH RBC QN AUTO: 31.6 PG (ref 26.1–32.9)
MCHC RBC AUTO-ENTMCNC: 32.6 G/DL (ref 31.4–35)
MCV RBC AUTO: 96.9 FL (ref 79.6–97.8)
NRBC # BLD: 0 K/UL (ref 0–0.2)
PLATELET # BLD AUTO: 212 K/UL (ref 150–450)
PMV BLD AUTO: 9.2 FL (ref 9.4–12.3)
POTASSIUM SERPL-SCNC: 4.6 MMOL/L (ref 3.5–5.1)
RBC # BLD AUTO: 4.24 M/UL (ref 4.23–5.6)
SERVICE CMNT-IMP: ABNORMAL
SODIUM SERPL-SCNC: 144 MMOL/L (ref 136–145)
WBC # BLD AUTO: 6.4 K/UL (ref 4.3–11.1)

## 2022-04-22 PROCEDURE — 97165 OT EVAL LOW COMPLEX 30 MIN: CPT

## 2022-04-22 PROCEDURE — 80048 BASIC METABOLIC PNL TOTAL CA: CPT

## 2022-04-22 PROCEDURE — 82962 GLUCOSE BLOOD TEST: CPT

## 2022-04-22 PROCEDURE — 85027 COMPLETE CBC AUTOMATED: CPT

## 2022-04-22 PROCEDURE — 2709999900 HC NON-CHARGEABLE SUPPLY

## 2022-04-22 PROCEDURE — 74011636637 HC RX REV CODE- 636/637: Performed by: SURGERY

## 2022-04-22 PROCEDURE — 74011000250 HC RX REV CODE- 250: Performed by: SURGERY

## 2022-04-22 PROCEDURE — 74011000258 HC RX REV CODE- 258: Performed by: FAMILY MEDICINE

## 2022-04-22 PROCEDURE — 65270000029 HC RM PRIVATE

## 2022-04-22 PROCEDURE — 97161 PT EVAL LOW COMPLEX 20 MIN: CPT

## 2022-04-22 PROCEDURE — 97535 SELF CARE MNGMENT TRAINING: CPT

## 2022-04-22 PROCEDURE — 74011250636 HC RX REV CODE- 250/636: Performed by: SURGERY

## 2022-04-22 PROCEDURE — 74011000258 HC RX REV CODE- 258: Performed by: SURGERY

## 2022-04-22 PROCEDURE — 74011250636 HC RX REV CODE- 250/636: Performed by: FAMILY MEDICINE

## 2022-04-22 PROCEDURE — 36415 COLL VENOUS BLD VENIPUNCTURE: CPT

## 2022-04-22 PROCEDURE — 97530 THERAPEUTIC ACTIVITIES: CPT

## 2022-04-22 RX ORDER — HYDROMORPHONE HYDROCHLORIDE 1 MG/ML
0.6 INJECTION, SOLUTION INTRAMUSCULAR; INTRAVENOUS; SUBCUTANEOUS ONCE
Status: COMPLETED | OUTPATIENT
Start: 2022-04-22 | End: 2022-04-22

## 2022-04-22 RX ORDER — HYDROMORPHONE HYDROCHLORIDE 1 MG/ML
1 INJECTION, SOLUTION INTRAMUSCULAR; INTRAVENOUS; SUBCUTANEOUS
Status: DISCONTINUED | OUTPATIENT
Start: 2022-04-22 | End: 2022-04-23

## 2022-04-22 RX ORDER — DEXTROSE, SODIUM CHLORIDE, AND POTASSIUM CHLORIDE 5; .45; .15 G/100ML; G/100ML; G/100ML
75 INJECTION INTRAVENOUS CONTINUOUS
Status: DISCONTINUED | OUTPATIENT
Start: 2022-04-22 | End: 2022-04-24

## 2022-04-22 RX ADMIN — HYDROMORPHONE HYDROCHLORIDE 0.6 MG: 1 INJECTION, SOLUTION INTRAMUSCULAR; INTRAVENOUS; SUBCUTANEOUS at 02:58

## 2022-04-22 RX ADMIN — PIPERACILLIN AND TAZOBACTAM 3.38 G: 3; .375 INJECTION, POWDER, LYOPHILIZED, FOR SOLUTION INTRAVENOUS at 09:33

## 2022-04-22 RX ADMIN — DEXTROSE MONOHYDRATE, SODIUM CHLORIDE, AND POTASSIUM CHLORIDE 125 ML/HR: 50; 4.5; 1.49 INJECTION, SOLUTION INTRAVENOUS at 23:04

## 2022-04-22 RX ADMIN — HYDROMORPHONE HYDROCHLORIDE 0.6 MG: 1 INJECTION, SOLUTION INTRAMUSCULAR; INTRAVENOUS; SUBCUTANEOUS at 09:33

## 2022-04-22 RX ADMIN — HYDROMORPHONE HYDROCHLORIDE 1 MG: 1 INJECTION, SOLUTION INTRAMUSCULAR; INTRAVENOUS; SUBCUTANEOUS at 15:56

## 2022-04-22 RX ADMIN — HYDROMORPHONE HYDROCHLORIDE 1 MG: 1 INJECTION, SOLUTION INTRAMUSCULAR; INTRAVENOUS; SUBCUTANEOUS at 20:03

## 2022-04-22 RX ADMIN — ENOXAPARIN SODIUM 40 MG: 40 INJECTION SUBCUTANEOUS at 09:32

## 2022-04-22 RX ADMIN — SODIUM CHLORIDE, PRESERVATIVE FREE 10 ML: 5 INJECTION INTRAVENOUS at 06:22

## 2022-04-22 RX ADMIN — SODIUM CHLORIDE, PRESERVATIVE FREE 10 ML: 5 INJECTION INTRAVENOUS at 13:55

## 2022-04-22 RX ADMIN — THIAMINE HYDROCHLORIDE 100 MG: 100 INJECTION, SOLUTION INTRAMUSCULAR; INTRAVENOUS at 12:03

## 2022-04-22 RX ADMIN — PIPERACILLIN AND TAZOBACTAM 3.38 G: 3; .375 INJECTION, POWDER, LYOPHILIZED, FOR SOLUTION INTRAVENOUS at 00:39

## 2022-04-22 RX ADMIN — DEXTROSE MONOHYDRATE, SODIUM CHLORIDE, AND POTASSIUM CHLORIDE 125 ML/HR: 50; 4.5; 1.49 INJECTION, SOLUTION INTRAVENOUS at 14:15

## 2022-04-22 RX ADMIN — HYDROMORPHONE HYDROCHLORIDE 0.6 MG: 1 INJECTION, SOLUTION INTRAMUSCULAR; INTRAVENOUS; SUBCUTANEOUS at 00:38

## 2022-04-22 RX ADMIN — HYDROMORPHONE HYDROCHLORIDE 0.6 MG: 1 INJECTION, SOLUTION INTRAMUSCULAR; INTRAVENOUS; SUBCUTANEOUS at 06:26

## 2022-04-22 RX ADMIN — PIPERACILLIN AND TAZOBACTAM 3.38 G: 3; .375 INJECTION, POWDER, LYOPHILIZED, FOR SOLUTION INTRAVENOUS at 17:49

## 2022-04-22 RX ADMIN — Medication 5 UNITS: at 09:38

## 2022-04-22 RX ADMIN — HYDROMORPHONE HYDROCHLORIDE 1 MG: 1 INJECTION, SOLUTION INTRAMUSCULAR; INTRAVENOUS; SUBCUTANEOUS at 12:03

## 2022-04-22 RX ADMIN — ONDANSETRON 4 MG: 2 INJECTION INTRAMUSCULAR; INTRAVENOUS at 20:15

## 2022-04-22 RX ADMIN — SODIUM CHLORIDE, PRESERVATIVE FREE 10 ML: 5 INJECTION INTRAVENOUS at 23:08

## 2022-04-22 RX ADMIN — SODIUM CHLORIDE, PRESERVATIVE FREE 20 MG: 5 INJECTION INTRAVENOUS at 09:33

## 2022-04-22 NOTE — PROGRESS NOTES
Hospitalist Progress Note   Admit Date:  2022  4:44 PM   Name:  Patricia Rosario. Age:  80 y.o. Sex:  male  :  1950   MRN:  [id number]   Room:  [facility]/    Presenting Complaint: Abdominal Pain and Abnormal Lab Results    Reason(s) for Admission: SBO (small bowel obstruction) [facility] Course & Interval History:   Patricia Vee is a 80 y.o. male with medical history of GERD s/p nissen fundoplication , thyroid CA s/p resection, HTN, CAD who presented with epigastrict abdominal pain and RUQ pain that started around 4pm yesterday. Has not passed gas or had a BM since onset of symptoms. He had an outpatient CT today showing high grade SBO @ [facility] but wanted to be admitted at [address] [place] [address] so he drove here. Labs significant for WBC 19K with neutrophile predominance. rec'd zosyn in ED. General surgery was notified and requested hospitalist admission. Patient in severe discomfort on my eval. Family at bedside. No PO intake > 24 hours    He is s/p Nissen fundoplication performed in  by Dr. Miguel Michaels at an outside hospital and reports he has been unable to vomit since then. He is s/p laparoscopic cholecystectomy  by Dr. Miguel Michaels  He is s/p open appendectomy . He is s/p colonoscopy in 2019 and reports this was normal with his next planned in 5 years.     OSH CT a/p - Findings consistent with high-grade small bowel obstruction with transition point within the right upper quadrant. Subjective/24hr Events (22):  POD1 pain uncontrolled this AM. Re-asses this late afternoon and much imporved, able to take a nap. Family asking about cirrhosis diagnosis. D/w GI early regarding another patient who notes an over reporting of possible cirrhosis lately for unclear reasons. Reviewed CT from [facility] noting unremarkable liver. SNa, Plts not elevated. No BM. Improved NGT output. ROS:  10 systems reviewed and negative except as noted above.      Assessment & Plan:     High grade SBO with SIRS - refractory to bowel compression and rest. Now s/p expl laparotomy and reduction of internal hernia for high grade SBO on 4/21/22. Small bowel entrapped in the hernia was ischemic and injured but not resected and looked injury looked reversible but will take some time per , surgery. NGT to LIS. Cont. IVF. Zosyn. Acute liver injury - new transaminitis yesterday. Etiology unclear. Perhaps related to above and/or early sepsis from ischemic bowel. RUQ US showed normal size liver and heterogenous appearance and some contour nodularity suggested. No h/o alcohol abuse. H/o hepatoxic agent accicdentally taken double the dose and was closely monitored for hepatic failure thereafter. Repeat CMP daily. Check INR and viral hepatic panel in AM. with  [facility] CMP. Will call radiology tomorrow to review the images   Lab Results   Component Value Date/Time    ALT (SGPT) 395 (H) 04/21/2022 05:55 AM    AST (SGOT) 213 (H) 04/21/2022 05:55 AM    Alk. phosphatase 115 04/21/2022 05:55 AM    Bilirubin, total 1.7 (H) 04/21/2022 05:55 AM       GINNY 2/2 ATN - mildly improved. baseline Scr 1.0. cont. IVF, supportive care. Lab Results   Component Value Date/Time    Creatinine 1.41 04/22/2022 06:20 AM     Hyperglycemia - 2/2 severe illness, fasting  on admission. a1c 5.6%. start lantus 5 U daily while on dextrose containing fluids while NPO     Postsurgical hypothyroidism - hold hormone replacement at this time due to NPO state. pendin gclinical course will start IV      HTN - chronically on BB. Cont metoprolol 2.5 q8h IV to prevent rebound tachycardia      CAD - resume BB as above. Hold other PO meds for now. Chronic coronary artery disease       Papillary thyroid carcinoma, multifocal (3 tumors in the left lobe, largest tumor 0.9 cm), status post thyroidectomy/central lymph node dissection 5/26/2015 and 54.5 mCi iodine-131     - follows with endocrinology outpatient. GERD s/p buzz fundoplication - IV pepcid            Discharge Planning:      Not stable     Diet:  DIET NPO  DVT PPx: per surgery   Code status: Full Code    Hospital Problems as of 4/22/2022 Date Reviewed: 12/14/2021          Codes Class Noted - Resolved POA    Leukocytosis ICD-10-CM: D72.829  ICD-9-CM: 288.60  4/21/2022 - Present Yes        Generalized abdominal pain ICD-10-CM: R10.84  ICD-9-CM: 789.07  4/21/2022 - Present Yes        Transaminitis ICD-10-CM: R74.01  ICD-9-CM: 790.4  4/21/2022 - Present No        Acute kidney injury (GINNY) with acute tubular necrosis (ATN) ([facility] ICD-10-CM: N17.0  ICD-9-CM: 584.5  4/21/2022 - Present Yes        * (Principal) SBO (small bowel obstruction) ([facility] ICD-10-CM: U91.347  ICD-9-CM: 560.9  4/20/2022 - Present Yes    Overview Addendum 4/21/2022  1:49 PM by Danni Priest MD     4/21/22 s/p expl lap and reduction of internal hernia for high grade SBO; Dr Ana Patterson             Postsurgical hypothyroidism ICD-10-CM: E89.0  ICD-9-CM: 244.0  Unknown - Present Yes        Papillary thyroid carcinoma, multifocal (3 tumors in the left lobe, largest tumor 0.9 cm), status post thyroidectomy/central lymph node dissection 5/26/2015 and 54.5 mCi iodine-131 7/22/2015 ICD-10-CM: C73  ICD-9-CM: 910  6/21/2016 - Present Yes        GERD (gastroesophageal reflux disease) ICD-10-CM: K21.9  ICD-9-CM: 530.81  Unknown - Present Yes        Chronic coronary artery disease ICD-10-CM: I25.10  ICD-9-CM: 414.00  9/3/2015 - Present Yes    Overview Signed 6/21/2016  8:29 AM by Katheryn JOHNS     Overview:   BMS RCA 2008; NORMAL STRESS TEST 2014    Last Assessment & Plan:   He does not have any anginal quality chest pain. He occasionally has sharp atypical pain. His stress test was normal less than a year ago. I don't plan any further investigation.                    Objective:     Patient Vitals for the past 24 hrs:   Temp Pulse Resp BP SpO2   04/22/22 0717 98.7 °F (37.1 °C) 85 18 (!) 152/74 94 %   04/22/22 0351 98.9 °F (37.2 °C) 93 18 (!) 146/74 95 %   04/22/22 0028 98.9 °F (37.2 °C) 95 16 (!) 159/73 94 %   04/21/22 1751 98 °F (36.7 °C) (!) 103 18 (!) 146/72 97 %   04/21/22 1600  94      04/21/22 1521  94 16 (!) 150/69 96 %   04/21/22 1506  100 16 (!) 144/66 97 %   04/21/22 1451  94 16 (!) 158/70 94 %   04/21/22 1443  95 16 (!) 156/74 96 %   04/21/22 1433  99 16 (!) 165/79 94 %   04/21/22 1428  (!) 103 16 (!) 163/76 97 %   04/21/22 1416  96 16 (!) 172/79 97 %   04/21/22 1411  (!) 104 16 (!) 159/59 98 %   04/21/22 1406 98.7 °F (37.1 °C) (!) 111 15 (!) 180/79 99 %   04/21/22 1130    (!) 169/82    04/21/22 1032 98.4 °F (36.9 °C) 100 18 (!) 183/87 98 %     Oxygen Therapy  O2 Sat (%): 94 % (04/22/22 0717)  O2 Device: None (Room air) (04/22/22 0351)  O2 Flow Rate (L/min): 10 l/min (04/21/22 1416)    Estimated body mass index is 27.06 kg/m² as calculated from the following:    Height as of this encounter: 5' 8\" (1.727 m). Weight as of this encounter: 80.7 kg (178 lb). Intake/Output Summary (Last 24 hours) at 4/22/2022 0849  Last data filed at 4/22/2022 0631  Gross per 24 hour   Intake 3327 ml   Output 2175 ml   Net 1152 ml         Physical Exam:     Blood pressure (!) 152/74, pulse 85, temperature 98.7 °F (37.1 °C), resp. rate 18, height 5' 8\" (1.727 m), weight 80.7 kg (178 lb), SpO2 94 %. General:    Ill appearing. In moderate distress   Head:  Normocephalic, atraumatic  Eyes:  Sclerae appear normal.  Pupils equally round. ENT:  NGT in place. Neck:  No restricted ROM. Trachea midline   CV:   RRR. No m/r/g. No jugular venous distension. Lungs:   CTAB. No wheezing, rhonchi, or rales. Respirations even, unlabored  Abdomen: Bowel sounds absent. Distended. tender  Extremities: No cyanosis or clubbing. No edema  Skin:     No rashes and normal coloration. Warm and dry. Neuro:  CN II-XII grossly intact. Sensation intact. A&Ox3  Psych:  Normal mood and affect.       I have reviewed ordered lab tests and independently visualized imaging below:    Recent Labs:  Recent Results (from the past 48 hour(s))   CBC WITH AUTOMATED DIFF    Collection Time: 04/20/22  5:08 PM   Result Value Ref Range    WBC 19.3 (H) 4.3 - 11.1 K/uL    RBC 5.23 4.23 - 5.6 M/uL    HGB 16.8 13.6 - 17.2 g/dL    HCT 48.5 41.1 - 50.3 %    MCV 92.7 79.6 - 97.8 FL    MCH 32.1 26.1 - 32.9 PG    MCHC 34.6 31.4 - 35.0 g/dL    RDW 13.6 11.9 - 14.6 %    PLATELET 471 543 - 869 K/uL    MPV 8.8 (L) 9.4 - 12.3 FL    ABSOLUTE NRBC 0.00 0.0 - 0.2 K/uL    DF AUTOMATED      NEUTROPHILS 90 (H) 43 - 78 %    LYMPHOCYTES 5 (L) 13 - 44 %    MONOCYTES 5 4.0 - 12.0 %    EOSINOPHILS 0 (L) 0.5 - 7.8 %    BASOPHILS 0 0.0 - 2.0 %    IMMATURE GRANULOCYTES 0 0.0 - 5.0 %    ABS. NEUTROPHILS 17.4 (H) 1.7 - 8.2 K/UL    ABS. LYMPHOCYTES 0.9 0.5 - 4.6 K/UL    ABS. MONOCYTES 0.9 0.1 - 1.3 K/UL    ABS. EOSINOPHILS 0.0 0.0 - 0.8 K/UL    ABS. BASOPHILS 0.0 0.0 - 0.2 K/UL    ABS. IMM. GRANS. 0.1 0.0 - 0.5 K/UL   METABOLIC PANEL, COMPREHENSIVE    Collection Time: 04/20/22  5:08 PM   Result Value Ref Range    Sodium 137 136 - 145 mmol/L    Potassium 4.0 3.5 - 5.1 mmol/L    Chloride 102 98 - 107 mmol/L    CO2 26 21 - 32 mmol/L    Anion gap 9 7 - 16 mmol/L    Glucose 188 (H) 65 - 100 mg/dL    BUN 22 8 - 23 MG/DL    Creatinine 1.34 0.8 - 1.5 MG/DL    GFR est AA >60 >60 ml/min/1.73m2    GFR est non-AA 56 (L) >60 ml/min/1.73m2    Calcium 10.0 8.3 - 10.4 MG/DL    Bilirubin, total 1.1 0.2 - 1.1 MG/DL    ALT (SGPT) 55 12 - 65 U/L    AST (SGOT) 37 15 - 37 U/L    Alk.  phosphatase 104 50 - 136 U/L    Protein, total 7.8 6.3 - 8.2 g/dL    Albumin 3.8 3.2 - 4.6 g/dL    Globulin 4.0 (H) 2.3 - 3.5 g/dL    A-G Ratio 1.0 (L) 1.2 - 3.5     LIPASE    Collection Time: 04/20/22  5:08 PM   Result Value Ref Range    Lipase 71 (L) 73 - 393 U/L   HEMOGLOBIN A1C WITH EAG    Collection Time: 04/20/22  5:08 PM   Result Value Ref Range    Hemoglobin A1c 5.6 4.20 - 6.30 %    Est. average glucose 114 mg/dL LACTIC ACID    Collection Time: 04/20/22  5:58 PM   Result Value Ref Range    Lactic acid 1.5 0.4 - 2.0 MMOL/L   GLUCOSE, POC    Collection Time: 04/21/22 12:09 AM   Result Value Ref Range    Glucose (POC) 179 (H) 65 - 100 mg/dL    Performed by Satinder    METABOLIC PANEL, COMPREHENSIVE    Collection Time: 04/21/22  5:55 AM   Result Value Ref Range    Sodium 138 136 - 145 mmol/L    Potassium 4.3 3.5 - 5.1 mmol/L    Chloride 106 98 - 107 mmol/L    CO2 27 21 - 32 mmol/L    Anion gap 5 (L) 7 - 16 mmol/L    Glucose 203 (H) 65 - 100 mg/dL    BUN 29 (H) 8 - 23 MG/DL    Creatinine 1.49 0.8 - 1.5 MG/DL    GFR est AA 60 (L) >60 ml/min/1.73m2    GFR est non-AA 50 (L) >60 ml/min/1.73m2    Calcium 9.3 8.3 - 10.4 MG/DL    Bilirubin, total 1.7 (H) 0.2 - 1.1 MG/DL    ALT (SGPT) 395 (H) 12 - 65 U/L    AST (SGOT) 213 (H) 15 - 37 U/L    Alk. phosphatase 115 50 - 136 U/L    Protein, total 6.7 6.3 - 8.2 g/dL    Albumin 3.1 (L) 3.2 - 4.6 g/dL    Globulin 3.6 (H) 2.3 - 3.5 g/dL    A-G Ratio 0.9 (L) 1.2 - 3.5     MAGNESIUM    Collection Time: 04/21/22  5:55 AM   Result Value Ref Range    Magnesium 2.1 1.8 - 2.4 mg/dL   LACTIC ACID    Collection Time: 04/21/22  5:55 AM   Result Value Ref Range    Lactic acid 1.9 0.4 - 2.0 MMOL/L   CBC WITH AUTOMATED DIFF    Collection Time: 04/21/22  5:55 AM   Result Value Ref Range    WBC 16.7 (H) 4.3 - 11.1 K/uL    RBC 5.15 4.23 - 5.6 M/uL    HGB 16.6 13.6 - 17.2 g/dL    HCT 48.2 41.1 - 50.3 %    MCV 93.6 79.6 - 97.8 FL    MCH 32.2 26.1 - 32.9 PG    MCHC 34.4 31.4 - 35.0 g/dL    RDW 13.9 11.9 - 14.6 %    PLATELET 943 496 - 616 K/uL    MPV 8.7 (L) 9.4 - 12.3 FL    ABSOLUTE NRBC 0.00 0.0 - 0.2 K/uL    DF AUTOMATED      NEUTROPHILS 89 (H) 43 - 78 %    LYMPHOCYTES 3 (L) 13 - 44 %    MONOCYTES 7 4.0 - 12.0 %    EOSINOPHILS 0 (L) 0.5 - 7.8 %    BASOPHILS 0 0.0 - 2.0 %    IMMATURE GRANULOCYTES 0 0.0 - 5.0 %    ABS. NEUTROPHILS 14.9 (H) 1.7 - 8.2 K/UL    ABS. LYMPHOCYTES 0.6 0.5 - 4.6 K/UL    ABS. MONOCYTES 1.1 0.1 - 1.3 K/UL    ABS. EOSINOPHILS 0.0 0.0 - 0.8 K/UL    ABS. BASOPHILS 0.1 0.0 - 0.2 K/UL    ABS. IMM.  GRANS. 0.1 0.0 - 0.5 K/UL   GLUCOSE, POC    Collection Time: 04/21/22  6:12 AM   Result Value Ref Range    Glucose (POC) 215 (H) 65 - 100 mg/dL    Performed by Satinder    GLUCOSE, POC    Collection Time: 04/21/22 12:03 PM   Result Value Ref Range    Glucose (POC) 142 (H) 65 - 100 mg/dL    Performed by Kailey    GLUCOSE, POC    Collection Time: 04/21/22  5:46 PM   Result Value Ref Range    Glucose (POC) 114 (H) 65 - 100 mg/dL    Performed by Jane    GLUCOSE, POC    Collection Time: 04/22/22 12:24 AM   Result Value Ref Range    Glucose (POC) 148 (H) 65 - 100 mg/dL    Performed by  [facility], POC    Collection Time: 04/22/22  6:12 AM   Result Value Ref Range    Glucose (POC) 122 (H) 65 - 100 mg/dL    Performed by Kelsi    CBC W/O DIFF    Collection Time: 04/22/22  6:20 AM   Result Value Ref Range    WBC 6.4 4.3 - 11.1 K/uL    RBC 4.24 4.23 - 5.6 M/uL    HGB 13.4 (L) 13.6 - 17.2 g/dL    HCT 41.1 41.1 - 50.3 %    MCV 96.9 79.6 - 97.8 FL    MCH 31.6 26.1 - 32.9 PG    MCHC 32.6 31.4 - 35.0 g/dL    RDW 14.4 11.9 - 14.6 %    PLATELET 160 636 - 010 K/uL    MPV 9.2 (L) 9.4 - 12.3 FL    ABSOLUTE NRBC 0.00 0.0 - 0.2 K/uL   METABOLIC PANEL, BASIC    Collection Time: 04/22/22  6:20 AM   Result Value Ref Range    Sodium 144 136 - 145 mmol/L    Potassium 4.6 3.5 - 5.1 mmol/L    Chloride 112 (H) 98 - 107 mmol/L    CO2 28 21 - 32 mmol/L    Anion gap 4 (L) 7 - 16 mmol/L    Glucose 142 (H) 65 - 100 mg/dL    BUN 24 (H) 8 - 23 MG/DL    Creatinine 1.41 0.8 - 1.5 MG/DL    GFR est AA >60 >60 ml/min/1.73m2    GFR est non-AA 53 (L) >60 ml/min/1.73m2    Calcium 8.5 8.3 - 10.4 MG/DL       All Micro Results     Procedure Component Value Units Date/Time    CULTURE, BLOOD [[id number]] Collected: 04/20/22 1907    Order Status: Completed Specimen: Blood Updated: 04/20/22 1910 CULTURE, BLOOD [[id number]] Collected: 04/20/22 6769    Order Status: Completed Specimen: Blood Updated: 04/20/22 1815          Other Studies:  US ABD LTD    Result Date: 4/21/2022  Abdominal ultrasound, 4/21/2022 History: Elevated liver function tests. Comparison:  None  Findings: The liver is normal in size measuring 14.7 cm. No intrahepatic biliary ductal dilitation is seen. The liver appears heterogeneous and some contour nodularity is suggested. These can suggest cirrhosis. .  The main portal vein is patent and demonstrates hepatopedal flow. The gallbladder has been removed. The common bile duct is normal in caliber measuring 3.2 mm. The pancreas is not seen due to overlying bowel gas and therefore cannot be assessed. The right kidney measures 10.4 cm in length. No shadowing stones, or hydronephrosis is seen of the right kidney. Only increased parenchymal echogenicity is seen suggested chronic medical renal changes. The IVC is patent. The distal abdominal aorta is normal in caliber measuring 1.4 cm. The non-visualized portions of the aorta cannot be evaluated. Small upper abdominal ascites is seen. 1.  Heterogeneous liver with some questionable contour nodularity. These can be indicated to cirrhosis. 2.  No intra- or extrahepatic biliary ductal dilitation. 3.  Patent portal vein and IVC. Flow in the portal vein is hepatopedal in direction. 4. Small upper abdominal ascites.         Current Meds:  Current Facility-Administered Medications   Medication Dose Route Frequency    dextrose 5% - 0.45% NaCl with KCl 20 mEq/L infusion  125 mL/hr IntraVENous CONTINUOUS    insulin glargine (LANTUS) injection 5 Units  5 Units SubCUTAneous DAILY    lidocaine (XYLOCAINE) 10 mg/mL (1 %) injection 0.1 mL  0.1 mL SubCUTAneous PRN    HYDROmorphone (DILAUDID) injection 0.3-0.6 mg  0.3-0.6 mg IntraVENous Q3H PRN    sodium chloride (NS) flush 5-40 mL  5-40 mL IntraVENous Q8H    sodium chloride (NS) flush 5-40 mL  5-40 mL IntraVENous PRN    acetaminophen (TYLENOL) tablet 650 mg  650 mg Oral Q6H PRN    Or    acetaminophen (TYLENOL) suppository 650 mg  650 mg Rectal Q6H PRN    ondansetron (ZOFRAN) injection 4 mg  4 mg IntraVENous Q6H PRN    enoxaparin (LOVENOX) injection 40 mg  40 mg SubCUTAneous DAILY    piperacillin-tazobactam (ZOSYN) 3.375 g in 0.9% sodium chloride (MBP/ADV) 100 mL MBP  3.375 g IntraVENous Q8H    [Held by provider] metoprolol (LOPRESSOR) injection 2.5 mg  2.5 mg IntraVENous Q8H    glucose chewable tablet 16 g  16 g Oral PRN    glucagon (GLUCAGEN) injection 1 mg  1 mg IntraMUSCular PRN    dextrose 10% infusion 125-250 mL  125-250 mL IntraVENous PRN    insulin regular (NOVOLIN R, HUMULIN R) injection   SubCUTAneous Q6H    famotidine (PF) (PEPCID) 20 mg in 0.9% sodium chloride 10 mL injection  20 mg IntraVENous DAILY    naloxone (NARCAN) injection 0.2 mg  0.2 mg IntraVENous EVERY 2 MINUTES AS NEEDED       Signed:  Marcella Shepherd DO    Part of this note may have been written by using a voice dictation software. The note has been proof read but may still contain some grammatical/other typographical errors.

## 2022-04-22 NOTE — PROGRESS NOTES
Problem: Self Care Deficits Care Plan (Adult)  Goal: *Acute Goals and Plan of Care (Insert Text)  Outcome: Progressing Towards Goal  Note:   Patient will complete lower body dressing with supervision to increase self care independence. Patient will complete bathing with supervision to increase self care independence. Patient will tolerate 40 minutes of OT treatment with self incorporated rest breaks to increase activity tolerance to enhance participation in hobbies. Patient will complete all functional transfers with supervision using adaptive equipment as needed. Patient will complete UE exercises with supervision to increase overall activity tolerance and strength. Timeframe: 7 visits          OCCUPATIONAL THERAPY: Initial Assessment and Daily Note 4/22/2022  INPATIENT: OT Visit Days: 1  [place] / [place] / Product Type: Medicare /      NAME/AGE/GENDER: Yuko Staley is a 80 y.o. male   PRIMARY DIAGNOSIS:  [facility] [K56.609] SBO (small bowel obstruction) (HCC) SBO (small bowel obstruction) (HCC)  Procedure(s) (LRB):  LAPAROTOMY EXPLORATORY POSSIBLE BOWEL RESECTION (N/A)  1 Day Post-Op  ICD-10: Treatment Diagnosis:    Generalized Muscle Weakness (M62.81)  Other lack of cordination (R27.8)   Precautions/Allergies:     Patient has no known allergies. ASSESSMENT:     Mr. Nico Winters presents SBO and recent bowel resection as resulting dx. Patient able to be removed from wall GI suction with Nurse assistance. Patient is a hard worker and feeling approprietly poor due to dx. He should progress steadily and quickly as he has a high baseline and motivated. Just mild weakness and balance deficits affecting ADL's and mobility. Will do well at home at d/c. Initiate OT.      This section established at most recent assessment   PROBLEM LIST (Impairments causing functional limitations):  Decreased Strength  Decreased ADL/Functional Activities  Decreased Balance  Decreased Activity Tolerance   INTERVENTIONS PLANNED: (Benefits and precautions of occupational therapy have been discussed with the patient.)  Activities of daily living training  Neuromuscular re-eduation  Therapeutic activity  Therapeutic exercise     TREATMENT PLAN: Frequency/Duration: Follow patient 1-2tx to address above goals. Rehabilitation Potential For Stated Goals: Good     REHAB RECOMMENDATIONS (at time of discharge pending progress):    Placement: It is my opinion, based on this patient's performance to date, that Mr. Nico Winters may benefit from participating in 1-2 additional therapy sessions in order to continue to assess for rehab potential and then make recommendation for disposition at discharge. Equipment:   None at this time              OCCUPATIONAL PROFILE AND HISTORY:   History of Present Injury/Illness (Reason for Referral):  See H&P  Past Medical History/Comorbidities:   Mr. Nico Winters  has a past medical history of Arthritis, Chronic coronary artery disease, Gastroesophageal reflux disease, Gout, Hypercholesterolemia, Hypertension, Malignant melanoma, Papillary thyroid carcinoma, multifocal (3 tumors in the left lobe, largest tumor 0.9 cm), status post thyroidectomy/central lymph node dissection 5/26/2015 and 54.5 mCi iodine-131 7/22/2015, and Postsurgical hypothyroidism. Mr. Nico Winters  has a past surgical history that includes hx coronary stent placement (2010); hx malignant skin lesion excision (2011); hx cholecystectomy (2005); hx knee arthroscopy (Right, 2005); hx tonsillectomy (Age 4); hx appendectomy (1969); hx thyroidectomy (5/26/2015); hx gi (2008); and hx coronary artery bypass graft (08/19/2016).   Social History/Living Environment:   Home Environment: Apartment  # Steps to Enter: 4  Rails to Enter: Yes  [employer] : Left  One/Two Story Residence: One story  Living Alone: No  Support Systems: Spouse/Significant Other  Patient Expects to be Discharged to[de-identified] Home  Current DME Used/Available at Home: None  Prior Level of Function/Work/Activity:  Independent ADL's and IADL's. Enjoys fishing. Drives. Number of Personal Factors/Comorbidities that affect the Plan of Care: Brief history (0):  LOW COMPLEXITY   ASSESSMENT OF OCCUPATIONAL PERFORMANCE[de-identified]   Activities of Daily Living:   Basic ADLs (From Assessment) Complex ADLs (From Assessment)   Feeding: Independent  Oral Facial Hygiene/Grooming: Supervision  Bathing: Contact guard assistance  Upper Body Dressing: Setup  Lower Body Dressing: Contact guard assistance  Toileting: Contact guard assistance     Grooming/Bathing/Dressing Activities of Daily Living     Cognitive Retraining  Safety/Judgement: Awareness of environment; Fall prevention                       Bed/Mat Mobility  Supine to Sit: Contact guard assistance  Sit to Supine:  (NT)  Sit to Stand: Contact guard assistance  Stand to Sit: Contact guard assistance  Bed to Chair: Contact guard assistance  Scooting: Contact guard assistance     Most Recent Physical Functioning:   Gross Assessment:                  Posture:     Balance:  Sitting: Intact  Standing: With support Bed Mobility:  Supine to Sit: Contact guard assistance  Sit to Supine:  (NT)  Scooting: Contact guard assistance  Wheelchair Mobility:     Transfers:  Sit to Stand: Contact guard assistance  Stand to Sit: Contact guard assistance  Bed to Chair: Contact guard assistance            Patient Vitals for the past 6 hrs:   BP BP Patient Position SpO2 Pulse   04/22/22 0717 (!) 152/74 Sitting 94 % 85   04/22/22 1122 (!) 157/68 Sitting 93 % 85       Mental Status  Neurologic State: Alert  Orientation Level: Oriented X4  Cognition: Appropriate decision making  Perception: Appears intact  Safety/Judgement: Awareness of environment,Fall prevention            LLE Assessment  LLE Assessment (WDL): Exception to WDL RLE Assessment  RLE Assessment (WDL): Exceptions to [facility]           Physical Skills Involved:  Range of Motion  Balance  Strength  Activity Tolerance Cognitive Skills Affected (resulting in the inability to perform in a timely and safe manner):  [facility] Psychosocial Skills Affected:  WFL   Number of elements that affect the Plan of Care: 1-3:  LOW COMPLEXITY   CLINICAL DECISION MAKING:   [employer] AM-PAC 6 Clicks   Daily Activity Inpatient Short Form  How much help from another person does the patient currently need. .. Total A Lot A Little None   1. Putting on and taking off regular lower body clothing? [] 1   [] 2   [x] 3   [] 4   2. Bathing (including washing, rinsing, drying)? [] 1   [] 2   [x] 3   [] 4   3. Toileting, which includes using toilet, bedpan or urinal?   [] 1   [] 2   [x] 3   [] 4   4. Putting on and taking off regular upper body clothing? [] 1   [] 2   [] 3   [x] 4   5. Taking care of personal grooming such as brushing teeth? [] 1   [] 2   [] 3   [x] 4   6. Eating meals? [] 1   [] 2   [] 3   [x] 4   © 2007, Trustees of [facility] [employer], under license to [employer]. All rights reserved      Score:  Initial: 21 Most Recent: X (Date: -- )    Interpretation of Tool:  Represents activities that are increasingly more difficult (i.e. Bed mobility, Transfers, Gait). Medical Necessity:     Skilled intervention continues to be required due to Deficits noted abvoe. Reason for Services/Other Comments:  Patient continues to require skilled intervention due to   Dx above  . Use of outcome tool(s) and clinical judgement create a POC that gives a: MODERATE COMPLEXITY         TREATMENT:   (In addition to Assessment/Re-Assessment sessions the following treatments were rendered)     Pre-treatment Symptoms/Complaints:    Pain: Initial:   Pain Intensity 1: 7  Post Session:  6     Self Care: (10): Procedure(s) (per grid) utilized to improve and/or restore self-care/home management as related to dressing, toileting, and grooming.  Required minimal verbal and tactile cueing to facilitate activities of daily living skills. Initial evaluation 10 minutes. Braces/Orthotics/Lines/Etc:   O2 Device: None (Room air)  Treatment/Session Assessment:    Response to Treatment:  Good, sitting up in recliner  Interdisciplinary Collaboration:   Physical Therapist  Occupational Therapist  Registered Nurse  After treatment position/precautions:   Up in chair  Bed in low position  RN notified   Compliance with Program/Exercises: Compliant all of the time, Will assess as treatment progresses. Recommendations/Intent for next treatment session: \"Next visit will focus on advancements to more challenging activities and reduction in assistance provided\".   Total Treatment Duration:  OT Patient Time In/Time Out  Time In: 1100  Time Out: Medina Proctor [address], [place]

## 2022-04-22 NOTE — PROGRESS NOTES
Problem: Mobility Impaired (Adult and Pediatric)  Goal: *Acute Goals and Plan of Care (Insert Text)  Outcome: Progressing Towards Goal  Note: DISCHARGE GOALS :  (1.)Mr. Edwin Brannon will move from supine to sit and sit to supine  with SUPERVISION (flat bed no rail) . (2.)Mr. Edwin Brannon will transfer from bed to chair and chair to bed with SUPERVISION. (3.)Mr. Edwin Brannon will ambulate with SUPERVISION for 400 feet . 4) pt able to go up & down 4 steps using a rail & SBA.    ________________________________________________________________________________________________      PHYSICAL THERAPY: Initial Assessment and AM 4/22/2022  INPATIENT: PT Visit Days : 1  [place] / Product Type: Medicare /       NAME/AGE/GENDER: Celsa Graham is a 80 y.o. male   PRIMARY DIAGNOSIS: [facility] [K56.609] SBO (small bowel obstruction) (HCC) SBO (small bowel obstruction) (HCC)  Procedure(s) (LRB):  LAPAROTOMY EXPLORATORY POSSIBLE BOWEL RESECTION (N/A)  1 Day Post-Op  ICD-10: Treatment Diagnosis:   Generalized Muscle Weakness (M62.81)  Other lack of cordination (R27.8)  Difficulty in walking, Not elsewhere classified (R26.2)  Other abnormalities of gait and mobility (R26.89)   Precaution/Allergies:  Patient has no known allergies. ASSESSMENT:     Mr. Edwin Brannon presents with general deconditioning from hospitalization & surgery for SBO. This pt participated well with mild unsteadiness during gait  transfers. This pt will benefit from follow up therapy to help restore safer function prior to returning home with caregiver. PT advised HHPT to follow up to do a home safety assessment & ensure a smooth transition back home.     This section established at most recent assessment   PROBLEM LIST (Impairments causing functional limitations):  Decreased Strength  Decreased ADL/Functional Activities  Decreased Transfer Abilities  Decreased Ambulation Ability/Technique  Decreased Balance  Increased Pain  Decreased Activity Tolerance  Decreased Flexibility/Joint Mobility  Decreased [place] with Home Exercise Program   INTERVENTIONS PLANNED: (Benefits and precautions of physical therapy have been discussed with the patient.)  Balance Exercise  Bed Mobility  Gait Training  Therapeutic Activites  Therapeutic Exercise/Strengthening  Transfer Training     TREATMENT PLAN: Frequency/Duration: daily for duration of hospital stay  Rehabilitation Potential For Stated Goals: Good     REHAB RECOMMENDATIONS (at time of discharge pending progress):    Placement: It is my opinion, based on this patient's performance to date, that Mr. Caban may benefit from [address] after discharge due to the functional deficits listed above that are likely to improve with skilled rehabilitation because he/she has multiple medical issues that affect his/her functional mobility in the community. Equipment:   None at this time              HISTORY:   History of Present Injury/Illness (Reason for Referral):  Rigo Padgett is a 80 y.o. male with medical history of GERD s/p nissen fundoplication 1724, thyroid CA s/p resection, HTN, CAD who presented with epigastrict abdominal pain and RUQ pain that started around 4pm yesterday. Has not passed gas or had a BM since onset of symptoms. He had an outpatient CT today showing high grade SBO @ [facility] but wanted to be admitted at [address] [place] [address] so he drove here. Labs significant for WBC 19K with neutrophile predominance. rec'd zosyn in ED. General surgery was notified and requested hospitalist admission. Patient in severe discomfort on my eval. Family at bedside. No PO intake > 24 hours.       Past Medical History/Comorbidities:   Mr. Caban  has a past medical history of Arthritis, Chronic coronary artery disease, Gastroesophageal reflux disease, Gout, Hypercholesterolemia, Hypertension, Malignant melanoma, Papillary thyroid carcinoma, multifocal (3 tumors in the left lobe, largest tumor 0.9 cm), status post thyroidectomy/central lymph node dissection 5/26/2015 and 54.5 mCi iodine-131 7/22/2015, and Postsurgical hypothyroidism. Mr. Naya Bird  has a past surgical history that includes hx coronary stent placement (2010); hx malignant skin lesion excision (2011); hx cholecystectomy (2005); hx knee arthroscopy (Right, 2005); hx tonsillectomy (Age 4); hx appendectomy (1969); hx thyroidectomy (5/26/2015); hx gi (2008); and hx coronary artery bypass graft (08/19/2016). Social History/Living Environment:   Home Environment: Apartment  # Steps to Enter: 4  Rails to Enter: Yes  [employer] : Left  One/Two Story Residence: One story  Living Alone: No  Support Systems: Spouse/Significant Other  Patient Expects to be Discharged to[de-identified] Home  Current DME Used/Available at Home: None  Prior Level of Function/Work/Activity:  Pt was independent without an assistive device  Personal Factors: Other factors that influence how disability is experienced by the patient:  current  PMH   Number of Personal Factors/Comorbidities that affect the Plan of Care: 3+: HIGH COMPLEXITY   EXAMINATION:   Most Recent Physical Functioning:   Gross Assessment:  AROM: Generally decreased, functional  Strength: Generally decreased, functional  Coordination: Generally decreased, functional                  Balance:  Sitting: Intact; Without support  Standing: Impaired; Without support Bed Mobility:  Supine to Sit: Contact guard assistance (log roll)  Sit to Supine:  (NT)  Scooting: Contact guard assistance       Transfers:  Sit to Stand: Contact guard assistance  Stand to Sit: Contact guard assistance  Bed to Chair: Contact guard assistance  Gait:     Speed/Christine: Delayed  Step Length: Left shortened;Right shortened  Gait Abnormalities: Decreased step clearance (mild sway)  Distance (ft):  (additional 200ft after an initial 50ft gait assessment)  Assistive Device: (none)  Ambulation - Level of Assistance: Contact guard assistance  Home Environment: Apartment  Home Situation  Home Environment: Apartment  # Steps to Enter: 4  Rails to Enter: Yes  Hand Rails : Left  One/Two Story Residence: One story  Living Alone: No  Support Systems: Spouse/Significant Other  Patient Expects to be Discharged to[de-identified] Home  Current DME Used/Available at Home: None   Functional Mobility:         Gait/Ambulation:  cga        Transfers:  cga        Bed Mobility:  cga   Body Structures Involved:  Digestive Structures  Muscles Body Functions Affected: Movement Related  Digestive Activities and Participation Affected:  General Tasks and Demands  Mobility   Number of elements that affect the Plan of Care: 4+: HIGH COMPLEXITY   CLINICAL PRESENTATION:   Presentation: Stable and uncomplicated: LOW COMPLEXITY   CLINICAL DECISION MAKIN [facility] [address] AM-PAC 6 Clicks   Basic Mobility Inpatient Short Form  How much difficulty does the patient currently have. .. Unable A Lot A Little None   1. Turning over in bed (including adjusting bedclothes, sheets and blankets)? [] 1   [] 2   [x] 3   [] 4   2. Sitting down on and standing up from a chair with arms ( e.g., wheelchair, bedside commode, etc.)   [] 1   [] 2   [x] 3   [] 4   3. Moving from lying on back to sitting on the side of the bed? [] 1   [] 2   [x] 3   [] 4   How much help from another person does the patient currently need. .. Total A Lot A Little None   4. Moving to and from a bed to a chair (including a wheelchair)? [] 1   [] 2   [x] 3   [] 4   5. Need to walk in hospital room? [] 1   [] 2   [x] 3   [] 4   6. Climbing 3-5 steps with a railing? [x] 1   [] 2   [] 3   [] 4   © , Trustees of [facility] [address] [address], under license to [employer].  All rights reserved      Score:  Initial: 16 Most Recent: X (Date: -- )    Interpretation of Tool:  Represents activities that are increasingly more difficult (i.e. Bed mobility, Transfers, Gait).    Medical Necessity:     Patient is expected to demonstrate progress in   strength, range of motion, balance, coordination, and functional technique   to   decrease assistance required with bed mobility, transfers & gait  . Reason for Services/Other Comments:  Patient continues to require skilled intervention due to   Pt not independent with functional mobility  . Use of outcome tool(s) and clinical judgement create a POC that gives a: Clear prediction of patient's progress: LOW COMPLEXITY            TREATMENT:   (In addition to Assessment/Re-Assessment sessions the following treatments were rendered)   Pre-treatment Symptoms/Complaints:  pt agreeable  Pain: Initial: numeric scale  Pain Intensity 1: 3  Pain Location 1: Abdomen  Pain Orientation 1: Medial  Pain Intervention(s) 1: Ambulation/Increased Activity  Post Session:  3/10     Therapeutic Activity: (   23 min (extra time to work through activity noted): Therapeutic activities including LE AROM as warm up, EOB wt shift R<>L & F<>B, repeated sit<>stand with wt shift using a walker, also diagonal wt shift using a walker,  progressive gait training an additional 200 ft after an initial 50 ft gait assessment to improve mobility, strength, balance, coordination, and dynamic movement of arm - bilateral, leg - bilateral, and core to improve functional endurance & stability . Assessment    Braces/Orthotics/Lines/Etc:   IV  Treatment/Session Assessment:    Response to Treatment:  tolerated well  Interdisciplinary Collaboration:   Occupational Therapist  Registered Nurse    After treatment position/precautions:   Up in chair  Bed/Chair-wheels locked  Call light within reach  RN notified   Compliance with Program/Exercises: Compliant all of the time  Recommendations/Intent for next treatment session: \"Next visit will focus on reduction in assistance provided\".   Total Treatment Duration:  PT Patient Time In/Time Out  Time In: 1043  Time Out: 59 Selina Wolf

## 2022-04-22 NOTE — OP NOTES
[address] [address]  OPERATIVE REPORT    Name:  Dejuan Huertas  MR#:  [id number]  :  1950  ACCOUNT #:  [de-identified]  DATE OF SERVICE:  2022    PREOPERATIVE DIAGNOSIS:  High-grade small bowel obstruction, refractory to nasogastric decompression. POSTOPERATIVE DIAGNOSIS:  High-grade small bowel obstruction, refractory to nasogastric decompression. PROCEDURE PERFORMED:  Exploratory laparotomy with reduction of internal hernia (CPT 79054). SURGEON:  Uzair Bennett MD    ASSISTANT:  None. ANESTHESIA:  General.    COMPLICATIONS:  None. SPECIMENS REMOVED:  None. IMPLANTS:  None. ESTIMATED BLOOD LOSS:  Less than 30 mL. OPERATIVE PROCEDURE:  The patient was taken to the operating room, placed in the supine position. After adequate anesthesia was given, his abdomen was prepped and draped in sterile fashion with multiple layers of Betadine scrub and Betadine solution. An NG tube and Duran catheter had been placed. He was opened through a midline incision after time-out protocol was followed. He was given prophylactic antibiotics. He had markedly distended small bowel proximal to a high-grade obstruction caused by an internal hernia. The greater omentum had become adhesed to a loop of bowel inferiorly and created an internal hernia through which a loop of small bowel had become incarcerated. The adhesion was taken down and immediate tension was released on the obstructed site. The ischemic small bowel entrapped in the hernia was watched during the case for possible resection. The small bowel was run from the ligament of Treitz to the cecum, and there was no evidence of tumor, nodularity, adenopathy or stricture with the exception of the ischemic segment of bowel which was incarcerated. The NG tube was confirmed in the stomach and repositioned. Irrigation was used. Hemostasis was confirmed.   At the end of the case, we checked the small bowel once again and it looked much healthier. It was injured and ischemic but looked as if it would survive. It had no perforations or areas of necrosis. There were palpable mesenteric pulses immediately deep to the incarcerated small bowel segment. The small bowel was placed in its normal anatomic position. Irrigation was used. Hemostasis was confirmed. The fascia was closed with a running #1 PDS suture. The subcutaneous adipose was irrigated and closed with clips. A sterile dressing was placed. The patient tolerated the procedure well. There were no immediate complications.         Kellee Rivas MD      [id number]  D:  04/21/2022 17:57  T:  04/22/2022 4:33  JOB #:  [id number]

## 2022-04-22 NOTE — PROGRESS NOTES
Problem: Falls - Risk of  Goal: *Absence of Falls  Description: Document Jairo Warren Fall Risk and appropriate interventions in the flowsheet.   Outcome: Progressing Towards Goal  Note: Fall Risk Interventions:            Medication Interventions: Patient to call before getting OOB                   Problem: Patient Education: Go to Patient Education Activity  Goal: Patient/Family Education  Outcome: Progressing Towards Goal     Problem: Patient Education: Go to Patient Education Activity  Goal: Patient/Family Education  Outcome: Progressing Towards Goal     Problem: Small Bowel Obstruction: Day 1  Goal: Off Pathway (Use only if patient is Off Pathway)  Outcome: Progressing Towards Goal  Goal: Activity/Safety  Outcome: Progressing Towards Goal  Goal: Consults, if ordered  Outcome: Progressing Towards Goal  Goal: Diagnostic Test/Procedures  Outcome: Progressing Towards Goal  Goal: Nutrition/Diet  Outcome: Progressing Towards Goal  Goal: Medications  Outcome: Progressing Towards Goal  Goal: Respiratory  Outcome: Progressing Towards Goal  Goal: Treatments/Interventions/Procedures  Outcome: Progressing Towards Goal  Goal: Psychosocial  Outcome: Progressing Towards Goal  Goal: *Optimal pain control at patient's stated goal  Outcome: Progressing Towards Goal  Goal: *Adequate urinary output (equal to or greater than 30 milliliters/hour)  Outcome: Progressing Towards Goal  Goal: *Hemodynamically stable  Outcome: Progressing Towards Goal  Goal: *Demonstrates progressive activity  Outcome: Progressing Towards Goal  Goal: *Absence of nausea/vomiting  Outcome: Progressing Towards Goal     Problem: Small Bowel Obstruction: Day 2  Goal: Off Pathway (Use only if patient is Off Pathway)  Outcome: Progressing Towards Goal  Goal: Activity/Safety  Outcome: Progressing Towards Goal  Goal: Consults, if ordered  Outcome: Progressing Towards Goal  Goal: Diagnostic Test/Procedures  Outcome: Progressing Towards Goal  Goal: Nutrition/Diet  Outcome: Progressing Towards Goal  Goal: Discharge Planning  Outcome: Progressing Towards Goal  Goal: Medications  Outcome: Progressing Towards Goal  Goal: Respiratory  Outcome: Progressing Towards Goal  Goal: Treatments/Interventions/Procedures  Outcome: Progressing Towards Goal  Goal: Psychosocial  Outcome: Progressing Towards Goal  Goal: *Optimal pain control at patient's stated goal  Outcome: Progressing Towards Goal  Goal: *Adequate urinary output (equal to or greater than 30 milliliters/hour)  Outcome: Progressing Towards Goal  Goal: *Hemodynamically stable  Outcome: Progressing Towards Goal  Goal: *Demonstrates progressive activity  Outcome: Progressing Towards Goal  Goal: *Absence of nausea/vomiting  Outcome: Progressing Towards Goal  Goal: *Return of normal bowel function  Outcome: Progressing Towards Goal     Problem: Small Bowel Obstruction: Day 3  Goal: Off Pathway (Use only if patient is Off Pathway)  Outcome: Progressing Towards Goal  Goal: Activity/Safety  Outcome: Progressing Towards Goal  Goal: Consults, if ordered  Outcome: Progressing Towards Goal  Goal: Diagnostic Test/Procedures  Outcome: Progressing Towards Goal  Goal: Nutrition/Diet  Outcome: Progressing Towards Goal  Goal: Discharge Planning  Outcome: Progressing Towards Goal  Goal: Medications  Outcome: Progressing Towards Goal  Goal: Respiratory  Outcome: Progressing Towards Goal  Goal: Treatments/Interventions/Procedures  Outcome: Progressing Towards Goal  Goal: Psychosocial  Outcome: Progressing Towards Goal  Goal: *Optimal pain control at patient's stated goal  Outcome: Progressing Towards Goal  Goal: *Adequate urinary output (equal to or greater than 30 milliliters/hour)  Outcome: Progressing Towards Goal  Goal: *Hemodynamically stable  Outcome: Progressing Towards Goal  Goal: *Adequate nutrition  Outcome: Progressing Towards Goal  Goal: *Demonstrates progressive activity  Outcome: Progressing Towards Goal  Goal: *Participates in discharge planning  Outcome: Progressing Towards Goal     Problem: Small Bowel Obstruction: Day 3  Goal: Off Pathway (Use only if patient is Off Pathway)  Outcome: Progressing Towards Goal  Goal: Activity/Safety  Outcome: Progressing Towards Goal  Goal: Consults, if ordered  Outcome: Progressing Towards Goal  Goal: Diagnostic Test/Procedures  Outcome: Progressing Towards Goal  Goal: Nutrition/Diet  Outcome: Progressing Towards Goal  Goal: Discharge Planning  Outcome: Progressing Towards Goal  Goal: Medications  Outcome: Progressing Towards Goal  Goal: Respiratory  Outcome: Progressing Towards Goal  Goal: Treatments/Interventions/Procedures  Outcome: Progressing Towards Goal  Goal: Psychosocial  Outcome: Progressing Towards Goal  Goal: *Optimal pain control at patient's stated goal  Outcome: Progressing Towards Goal  Goal: *Adequate urinary output (equal to or greater than 30 milliliters/hour)  Outcome: Progressing Towards Goal  Goal: *Hemodynamically stable  Outcome: Progressing Towards Goal  Goal: *Adequate nutrition  Outcome: Progressing Towards Goal  Goal: *Demonstrates progressive activity  Outcome: Progressing Towards Goal  Goal: *Participates in discharge planning  Outcome: Progressing Towards Goal     Problem: Small Bowel Obstruction: Day 4 to Discharge  Goal: Off Pathway (Use only if patient is Off Pathway)  Outcome: Progressing Towards Goal  Goal: Activity/Safety  Outcome: Progressing Towards Goal  Goal: Nutrition/Diet  Outcome: Progressing Towards Goal  Goal: Discharge Planning  Outcome: Progressing Towards Goal  Goal: Medications  Outcome: Progressing Towards Goal  Goal: Respiratory  Outcome: Progressing Towards Goal  Goal: Treatments/Interventions/Procedures  Outcome: Progressing Towards Goal  Goal: Psychosocial  Outcome: Progressing Towards Goal     Problem: Small Bowel Obstruction - Non Surgical: Discharge Outcomes  Goal: *Hemodynamically stable  Outcome: Progressing Towards Goal  Goal: *Demonstrates independent activity or return to baseline  Outcome: Progressing Towards Goal  Goal: *Optimal pain control at patient's stated goal  Outcome: Progressing Towards Goal  Goal: *Verbalizes understanding and describes prescribed diet  Outcome: Progressing Towards Goal  Goal: *Tolerating diet  Outcome: Progressing Towards Goal  Goal: *Verbalizes name, dosage, time, side effects, and number of days to continue medications  Outcome: Progressing Towards Goal  Goal: *Anxiety reduced or absent  Outcome: Progressing Towards Goal  Goal: *Understands and describes signs and symptoms to report to providers(Stroke Metric)  Outcome: Progressing Towards Goal  Goal: *Describes follow-up/return visits to physicians  Outcome: Progressing Towards Goal  Goal: *Describes available resources and support systems  Outcome: Progressing Towards Goal  Goal: *Active bowel function  Outcome: Progressing Towards Goal

## 2022-04-22 NOTE — PROGRESS NOTES
Care Management Interventions  PCP Verified by CM: Yes (saw Wed 4/20/22)  Palliative Care Criteria Met (RRAT>21 & CHF Dx)?: No  Transition of Care Consult (CM Consult): Discharge Planning  Discharge Durable Medical Equipment: No (CPAP)  Physical Therapy Consult: Yes  Occupational Therapy Consult: Yes  Speech Therapy Consult: No  Support Systems: Spouse/Significant Other Mendez Azul [phone])  Confirm Follow Up Transport: Family  The Plan for Transition of Care is Related to the Following Treatment Goals : Home with spouse  Discharge Location  Patient Expects to be Discharged to[de-identified] Home with family assistance  Interdisciplinary rounds with Dr Medardo Hung, CM, nursing, PT, and dietary for plan of care. CM met with patient and spouse for d/c planning. Patient alert and oriented x 3, independent of ADL's prior to admission and lives with his spouse Feroz Venturar in one story apartment. DME includes CPAP but does not require anything for ambulation. He has transportation and able to drive. He has Medicare and [facility] and obtains medications through [facility] or if needs immediately [facility]. He is S/P Exp lap and reduction of hernia. PT recommends home health when ready for d/c. CM will f/u with patient for home health choice and send referral closer to d/c. Current d/c plan is home with spouse and if he is agreeable to home health. CM following.

## 2022-04-22 NOTE — PROGRESS NOTES
H&P/Consult Note/Progress Note/Office Note:   Fernando Huang MRN: [id number]  FQZ:[id number]/65/[id number]  Age:71 y.o.    HPI: Fernando Huang is a 80 y.o. male who is s/p expl laparotomy and reduction of internal hernia for high grade SBO on 4/21/22. Prior to surgery he was admitted by the hospitalist after he came to the ER on 4/20/22 with a 2-day history of diffuse, constant, progressive, severe abdominal pain. Nothing in particular made his pain better or worse. He had associated nausea but no vomiting  No recent abdominal trauma reported  He is s/p Nissen fundoplication performed in 2006 by Dr. Gigi Gupta at an outside hospital and reports he has been unable to vomit since then. He is s/p laparoscopic cholecystectomy 2006 by Dr. Gigi Gupta  He is s/p open appendectomy 1969. He is s/p colonoscopy in 2019 and reports this was normal with his next planned in 5 years. He had leukocytosis in the ER and a CT scan as shown below with a high-grade small bowel obstruction. An NG tube was placed to decompression and he was given IV fluids and bowel rest without improvement in his abdominal pain  Surgery was consulted by Dr Stella Beaver and Dr Sunita Parra      4/20/22 CT abd/pelvis with oral and IV contrast  LOWER CHEST: Unremarkable. ABDOMEN AND PELVIS:   Liver: Unremarkable. Biliary system: Cholecystectomy.     Pancreas: Unremarkable. Spleen: Unremarkable. Adrenals: Unremarkable.     Genitourinary: Symmetric renal enhancement. No hydronephrosis or ureteral calculus. Urinary bladder decompressed. Reproductive organs: Prostate gland is enlarged. Gastrointestinal tract: Multiple dilated loops of small bowel present throughout the abdomen. There is abrupt transition to decompressed bowel involving the proximal ileum within the right lower quadrant (3, 62). Peritoneum/Extraperitoneum: Unremarkable. Small amount of abdominopelvic ascites.      Vascular: Unremarkable   Musculoskeletal:  Degenerative changes of thoracolumbar spine. No acute or aggressive osseous lesion. IMPRESSION:   Findings consistent with high-grade small bowel obstruction with transition point within the right upper quadrant.             Past Medical History:   Diagnosis Date    Arthritis     Chronic coronary artery disease     Gastroesophageal reflux disease     Gout     Hypercholesterolemia     Hypertension     Malignant melanoma     Papillary thyroid carcinoma, multifocal (3 tumors in the left lobe, largest tumor 0.9 cm), status post thyroidectomy/central lymph node dissection 5/26/2015 and 54.5 mCi iodine-131 7/22/2015     Postsurgical hypothyroidism      Past Surgical History:   Procedure Laterality Date    HX APPENDECTOMY  1969    HX CHOLECYSTECTOMY  2005    HX CORONARY ARTERY BYPASS GRAFT  08/19/2016    Dr. Keisha Damon at [id number] [facility]  2010    HX GI  2008    Nissen fundoplication    HX KNEE ARTHROSCOPY Right 2005    HX MALIGNANT SKIN LESION EXCISION  2011    melanoma removed from ear lobe    HX THYROIDECTOMY  5/26/2015    including central lymph node dissection    HX TONSILLECTOMY  Age 4     Current Facility-Administered Medications   Medication Dose Route Frequency    dextrose 5% - 0.45% NaCl with KCl 20 mEq/L infusion  125 mL/hr IntraVENous CONTINUOUS    insulin glargine (LANTUS) injection 5 Units  5 Units SubCUTAneous DAILY    lidocaine (XYLOCAINE) 10 mg/mL (1 %) injection 0.1 mL  0.1 mL SubCUTAneous PRN    HYDROmorphone (DILAUDID) injection 0.3-0.6 mg  0.3-0.6 mg IntraVENous Q3H PRN    sodium chloride (NS) flush 5-40 mL  5-40 mL IntraVENous Q8H    sodium chloride (NS) flush 5-40 mL  5-40 mL IntraVENous PRN    acetaminophen (TYLENOL) tablet 650 mg  650 mg Oral Q6H PRN    Or    acetaminophen (TYLENOL) suppository 650 mg  650 mg Rectal Q6H PRN    ondansetron (ZOFRAN) injection 4 mg  4 mg IntraVENous Q6H PRN    enoxaparin (LOVENOX) injection 40 mg  40 mg SubCUTAneous DAILY    piperacillin-tazobactam (ZOSYN) 3.375 g in 0.9% sodium chloride (MBP/ADV) 100 mL MBP  3.375 g IntraVENous Q8H    [Held by provider] metoprolol (LOPRESSOR) injection 2.5 mg  2.5 mg IntraVENous Q8H    glucose chewable tablet 16 g  16 g Oral PRN    glucagon (GLUCAGEN) injection 1 mg  1 mg IntraMUSCular PRN    dextrose 10% infusion 125-250 mL  125-250 mL IntraVENous PRN    insulin regular (NOVOLIN R, HUMULIN R) injection   SubCUTAneous Q6H    famotidine (PF) (PEPCID) 20 mg in 0.9% sodium chloride 10 mL injection  20 mg IntraVENous DAILY    naloxone (NARCAN) injection 0.2 mg  0.2 mg IntraVENous EVERY 2 MINUTES AS NEEDED     Patient has no known allergies. Social History     Socioeconomic History    Marital status:    Tobacco Use    Smoking status: Never Smoker    Smokeless tobacco: Never Used   Substance and Sexual Activity    Alcohol use: No    Drug use: No     Social History     Tobacco Use   Smoking Status Never Smoker   Smokeless Tobacco Never Used     Family History   Problem Relation Age of Onset    Cancer Mother         Lung    Arthritis-rheumatoid Father     Coronary Art Dis Father     Cancer Brother         Melanoma    Thyroid Cancer Neg Hx      ROS: The patient has no difficulty with chest pain or shortness of breath. No fever or chills. Comprehensive review of systems was otherwise unremarkable except as noted above. Physical Exam:   Visit Vitals  BP (!) 146/74 (BP 1 Location: Right upper arm, BP Patient Position: Sitting)   Pulse 93   Temp 98.9 °F (37.2 °C)   Resp 18   Ht 5' 8\" (1.727 m)   Wt 178 lb (80.7 kg)   SpO2 95%   BMI 27.06 kg/m²     Vitals:    04/21/22 1600 04/21/22 1751 04/22/22 0028 04/22/22 0351   BP:  (!) 146/72 (!) 159/73 (!) 146/74   Pulse: 94 (!) 103 95 93   Resp:  18 16 18   Temp:  98 °F (36.7 °C) 98.9 °F (37.2 °C) 98.9 °F (37.2 °C)   SpO2:  97% 94% 95%   Weight:       Height:         No intake/output data recorded.   04/20 1901 - 04/22 0700  In: 1593 [I.V.:4340]  Out: 7860 [Urine:525]    Constitutional: Alert, oriented, cooperative patient in no acute distress; appears stated age    Eyes:Sclera are clear. EOMs intact  ENMT: no external lesions gross hearing normal; no obvious neck masses, no ear or lip lesions, nares normal, +NGT with bilious output  CV: RRR. Normal perfusion  Resp: No JVD. Breathing is  non-labored; no audible wheezing. GI: dressing dry and intact; abd flat        Musculoskeletal: unremarkable with normal function. No embolic signs or cyanosis. Neuro:  Oriented; moves all 4; no focal deficits  Psychiatric: normal affect and mood, no memory impairment    Recent vitals (if inpt):  Patient Vitals for the past 24 hrs:   BP Temp Pulse Resp SpO2   04/22/22 0351 (!) 146/74 98.9 °F (37.2 °C) 93 18 95 %   04/22/22 0028 (!) 159/73 98.9 °F (37.2 °C) 95 16 94 %   04/21/22 1751 (!) 146/72 98 °F (36.7 °C) (!) 103 18 97 %   04/21/22 1600   94     04/21/22 1521 (!) 150/69  94 16 96 %   04/21/22 1506 (!) 144/66  100 16 97 %   04/21/22 1451 (!) 158/70  94 16 94 %   04/21/22 1443 (!) 156/74  95 16 96 %   04/21/22 1433 (!) 165/79  99 16 94 %   04/21/22 1428 (!) 163/76  (!) 103 16 97 %   04/21/22 1416 (!) 172/79  96 16 97 %   04/21/22 1411 (!) 159/59  (!) 104 16 98 %   04/21/22 1406 (!) 180/79 98.7 °F (37.1 °C) (!) 111 15 99 %   04/21/22 1130 (!) 169/82       04/21/22 1032 (!) 183/87 98.4 °F (36.9 °C) 100 18 98 %   04/21/22 0815 (!) 169/82           Amount and/or Complexity of Data Reviewed and Analyzed:  I reviewed and analyzed all of the unique labs and radiologic studies that are shown below as well as any that are in the HPI, and any that are in the expanded problem list below  *Each unique test, order, or document contributes to the combination of 2 or combination of 3 in Category 1 below. For this visit I also reviewed old records and prior notes.       Recent Labs     04/22/22  1417 04/21/22  0555 04/21/22  0555 04/20/22  3885 04/20/22  1708   WBC 6.4   < > 16.7*   < > 19.3*   HGB 13.4*   < > 16.6   < > 16.8      < > 250   < > 260   NA  --   --  138   < > 137   K  --   --  4.3   < > 4.0   CL  --   --  106   < > 102   CO2  --   --  27   < > 26   BUN  --   --  29*   < > 22   CREA  --   --  1.49   < > 1.34   GLU  --   --  203*   < > 188*   TBILI  --   --  1.7*   < > 1.1   ALT  --   --  395*   < > 55   AP  --   --  115   < > 104   LPSE  --   --   --   --  71*    < > = values in this interval not displayed. Review of most recent CBC  Lab Results   Component Value Date/Time    WBC 6.4 04/22/2022 06:20 AM    HGB 13.4 (L) 04/22/2022 06:20 AM    HCT 41.1 04/22/2022 06:20 AM    PLATELET 754 56/69/6869 06:20 AM    MCV 96.9 04/22/2022 06:20 AM       Review of most recent BMP  Lab Results   Component Value Date/Time    Sodium 138 04/21/2022 05:55 AM    Potassium 4.3 04/21/2022 05:55 AM    Chloride 106 04/21/2022 05:55 AM    CO2 27 04/21/2022 05:55 AM    Anion gap 5 (L) 04/21/2022 05:55 AM    Glucose 203 (H) 04/21/2022 05:55 AM    BUN 29 (H) 04/21/2022 05:55 AM    Creatinine 1.49 04/21/2022 05:55 AM    GFR est AA 60 (L) 04/21/2022 05:55 AM    GFR est non-AA 50 (L) 04/21/2022 05:55 AM    Calcium 9.3 04/21/2022 05:55 AM       Review of most recent LFTs (and lipase if done)  Lab Results   Component Value Date/Time    ALT (SGPT) 395 (H) 04/21/2022 05:55 AM    AST (SGOT) 213 (H) 04/21/2022 05:55 AM    Alk.  phosphatase 115 04/21/2022 05:55 AM    Bilirubin, total 1.7 (H) 04/21/2022 05:55 AM     Lab Results   Component Value Date/Time    Lipase 71 (L) 04/20/2022 05:08 PM       No results found for: INR, APTT, CBIL, LCAD, NH4, TROPT, TROIQ, INREXT, INREXT    Review of most recent HgbA1c  Lab Results   Component Value Date/Time    Hemoglobin A1c 5.6 04/20/2022 05:08 PM       Nutritional assessment screen for wound healing issues:  Lab Results   Component Value Date/Time    Protein, total 6.7 04/21/2022 05:55 AM    Albumin 3.1 (L) 04/21/2022 05:55 AM @lastcovr@  XR Results (most recent):  Results from Hospital Encounter encounter on 22    XR ABD (KUB)    Narrative  KUB    INDICATION: Nasogastric tube placement    A supine view of the abdomen was obtained. The tip of the nasogastric tube is  in the stomach. There is stable small bowel distention suggesting a small bowel  obstruction    Impression  1. Tip of the NG tube is in the stomach. 2.  Dilated small bowel concerning for small bowel obstruction      CT Results (most recent):  Results from Hospital Encounter encounter on 08    [address]    COMPUTED TOMOGRAPHY    NAME: Dillon Randall  PT : 1950               SEX: M         MR#: [id number]  LOCATION/NS: US-                 AGE: 88D      ACCT: [de-identified]  ORDERING: MERLENE LOVE            PT TYPE: Colman Sicard  RADIOLOGIST: Stas Sheffield ([id number])  Final Report      ICD Codes / Adm. Diagnosis:                  /  Examination:  CT CARDIAC OVER READ  - [id number] - 2008  9:37AM      CT CHEST, CARDIAC CT OVER READ, 2008    Reason:    REPORT:    TECHNIQUE: Helically acquired images were obtained from just above the  aortic architectural to the lower hemithoraces reconstructed at 2.5 mm  intervals after intravenous contrast.  No prior studies are available for  comparison. FINDINGS: There is no pericardial effusion. No pleural fluid is identified  in the visualized portions of the thorax. Incidental note is made of the  origin of the left vertebral artery from the aortic arch. The heart and  great vessels are otherwise unremarkable. There are coronary artery  calcifications will be described in dedicated coronary artery CTA report. The visualized portions of the lungs are clear with the exception of a tiny  granuloma within the right lower lobe measuring 2 mm. No adenopathy is seen within the thorax. IMPRESSION:  1.  Kerwin Saleh RIGHT LOWER LOBE GRANULOMA. 2. CORONARY ARTERY CALCIFICATIONS. Interpreting/Reading Doctor: Cristy Castro ([id number])  Transcribed: AGS on 2008  Approved: Cristy Castro ([id number])  2008        Distribution:  Attending Doctor: Jamey Myers Results (most recent):  Results from [place] encounter on 08    [address]  [facility]    ULTRASOUND    NAME: Lino Tolentino  PT : 1950               SEX: M         MR#: [id number]  LOCATION/NS: US-                 AGE: 25H      ACCT: [de-identified]  ORDERING: MERLENE LOVE            PT TYPE: Lolita Esparza  Tatiana Emely ([id number])  Final Report      ICD Codes / Adm. Diagnosis:                  /  Examination:  CAROTID ULTRASOUND  - [id number] - 2008  9:36AM      CAROTID ULTRASOUND, 08:    Reason:   Coronary artery disease, hypertension. 272.4. REPORT:  Right and left common, internal and external carotid artery peak  systolic velocities were all within normal limits and normal ratios were  demonstrated. Right carotid bifurcation demonstrates very mild soft plaque  within the proximal internal carotid artery without any significant  narrowing. Doppler waveform tracings are normal in the right common,  internal carotid arteries, as well as vertebral artery which demonstrate  antegrade flow. On the left, there is no suspicious atheromatous disease and normal Doppler  tracings are evident. Left vertebral artery demonstrates antegrade flow. IMPRESSION:    EXTREMELY MINIMAL ATHEROMATOUS DISEASE AT THE RIGHT CAROTID  BIFURCATION WITHOUT NARROWING.  OTHERWISE, NORMAL.     Interpreting/Reading Doctor: Margareth Arreguin ([id number])  Transcribed: CP on 2008  Approved: Margareth Arreguin ([id number])  2008        Distribution:  Attending Doctor: Jose Juan Husain        Admission date (for inpatients): 2022   * No surgery found * Procedure(s) with comments:  LAPAROTOMY EXPLORATORY POSSIBLE BOWEL RESECTION - REDUCTION OF INTERNAL HERNIA        ASSESSMENT/PLAN:  Problem List  Date Reviewed: 12/14/2021          Codes Class Noted    Leukocytosis ICD-10-CM: D72.829  ICD-9-CM: 288.60  4/21/2022        Generalized abdominal pain ICD-10-CM: R10.84  ICD-9-CM: 789.07  4/21/2022        Transaminitis ICD-10-CM: R74.01  ICD-9-CM: 790.4  4/21/2022        Acute kidney injury (GINNY) with acute tubular necrosis (ATN) ([facility] ICD-10-CM: N17.0  ICD-9-CM: 584.5  4/21/2022        * (Principal) SBO (small bowel obstruction) ([facility] ICD-10-CM: A65.361  ICD-9-CM: 560.9  4/20/2022    Overview Addendum 4/21/2022  1:49 PM by Isaias Warren MD     4/21/22 s/p expl lap and reduction of internal hernia for high grade SBO; Dr Chaka Conde             Postsurgical hypothyroidism ICD-10-CM: E89.0  ICD-9-CM: 244.0  Unknown        Papillary thyroid carcinoma, multifocal (3 tumors in the left lobe, largest tumor 0.9 cm), status post thyroidectomy/central lymph node dissection 5/26/2015 and 54.5 mCi iodine-131 7/22/2015 ICD-10-CM: C73  ICD-9-CM: 905  6/21/2016        GERD (gastroesophageal reflux disease) ICD-10-CM: K21.9  ICD-9-CM: 530.81  Unknown        Malignant melanoma ([facility] ICD-10-CM: C43.9  ICD-9-CM: 172.9  Unknown        Arthritis ICD-10-CM: M19.90  ICD-9-CM: 716.90  Unknown        Chronic coronary artery disease ICD-10-CM: I25.10  ICD-9-CM: 414.00  9/3/2015    Overview Signed 6/21/2016  8:29 AM by Jaime Holger D     Overview:   BMS RCA 2008; NORMAL STRESS TEST 2014    Last Assessment & Plan:   He does not have any anginal quality chest pain. He occasionally has sharp atypical pain. His stress test was normal less than a year ago. I don't plan any further investigation.              Dyslipidemia ICD-10-CM: E78.5  ICD-9-CM: 272.4  9/3/2015    Overview Signed 6/21/2016  8:29 AM by Jaime JOHNS     Overview:   STATIN INTOLERANT    Last Assessment & Plan:   He is statin intolerant but he is unable to tolerate red yeast rice and his total cholesterol is actually less than 200. We discussed the injectables but I don't think he is an appropriate candidate for that right now. He is doing pretty well just on Red yeast rice             Hypertension ICD-10-CM: I10  ICD-9-CM: 401.9  9/3/2015    Overview Signed 6/21/2016  8:29 AM by Dominga Miller. Paul Condon [address]:   Blood pressure is adequately controlled. Dysphonia ICD-10-CM: R49.0  ICD-9-CM: 784.42  8/28/2015            Principal Problem:    SBO (small bowel obstruction) ([facility] (4/20/2022)      Overview: 4/21/22 s/p expl lap and reduction of internal hernia for high grade SBO;       Dr Karin Fabian    Active Problems:    Chronic coronary artery disease (9/3/2015)      Overview: Overview:       BMS RCA 2008; NORMAL STRESS TEST 2014            Last Assessment & Plan:       He does not have any anginal quality chest pain. He occasionally has sharp       atypical pain. His stress test was normal less than a year ago. I don't       plan any further investigation. Papillary thyroid carcinoma, multifocal (3 tumors in the left lobe, largest tumor 0.9 cm), status post thyroidectomy/central lymph node dissection 5/26/2015 and 54.5 mCi iodine-131 7/22/2015 (6/21/2016)      GERD (gastroesophageal reflux disease) ()      Postsurgical hypothyroidism ()      Leukocytosis (4/21/2022)      Generalized abdominal pain (4/21/2022)      Transaminitis (4/21/2022)      Acute kidney injury (GINNY) with acute tubular necrosis (ATN) ([facility] (4/21/2022)           Number and Complexity of Problems addressed and   Risks of complications and/or morbidity of management          High-grade SBO with leukocytosis refractory to NGT decompression  He is s/p expl laparotomy and reduction of internal hernia for high grade SBO on 4/21/22.     NPO/NGT/IVF  Duran out today (POD1)  OOB    Leukocytosis resolved as of 4/22/22 (POD1)  LFTs suddenly bumped the morning of surgery  Repeat is pending  May need workup of LFTs (Hep panel, US etc,. ...) unless they normalize    Won't be ready for PO until next week  Small bowel entrapped in the hernia was ischemic and injured but not resected and looked injury looked reversible but will take some time            Level of MDM (2/3 elements below)  Number and Complexity of Problems Addressed Amount and/or Complexity of Data to be Reviewed and Analyzed  *Each unique test, order, or document contributes to the combination of 2 or combination of 3 in Category 1 below.  Risk of Complications and/or Morbidity or Mortality of pt Management     38868  61768 SF Minimal  1self-limited or minor problem Minimal or none Minimal risk of morbidity from additional diagnostic testing or Rx   47418  26387 Low Low  2or more self-limited or minor problems;    or  1stable chronic illness;    or  2JNGLR, uncomplicated illness or injury   Limited  (Must meet the requirements of at least 1 of the 2 categories)  Category 1: Tests and documents   Any combination of 2 from the following:  Review of prior external note(s) from each unique source*;  review of the result(s) of each unique test*;   ordering of each unique test*    or   Category 2: Assessment requiring an independent historian(s)  (For the categories of independent interpretation of tests and discussion of management or test interpretation, see moderate or high) Low risk of morbidity from additional diagnostic testing or treatment     19720  97542 Mod Moderate  1or more chronic illnesses with exacerbation, progression, or side effects of treatment;    or  2or more stable chronic illnesses;    or  1undiagnosed new problem with uncertain prognosis;    or  1acute illness with systemic symptoms;    or  8LPHLJ complicated injury   Moderate  (Must meet the requirements of at least 1 out of 3 categories)  Category 1: Tests, documents, or independent historian(s)  Any combination of 3 from the following:   Review of prior external note(s) from each unique source*;  Review of the result(s) of each unique test*;  Ordering of each unique test*;  Assessment requiring an independent historian(s)    or  Category 2: Independent interpretation of tests   Independent interpretation of a test performed by another physician/other qualified health care professional (not separately reported);     or  Category 3: Discussion of management or test interpretation  Discussion of management or test interpretation with external physician/other qualified health care professional/appropriate source (not separately reported)   Moderate risk of morbidity from additional diagnostic testing or treatment  Examples only:  Prescription drug management   Decision regarding minor surgery with identified patient or procedure risk factors  Decision regarding elective major surgery without identified patient or procedure risk factors   Diagnosis or treatment significantly limited by social determinants of health       28538  95489 High High  1or more chronic illnesses with severe exacerbation, progression, or side effects of treatment;    or  1 acute or chronic illness or injury that poses a threat to life or bodily function   Extensive  (Must meet the requirements of at least 2 out of 3 categories)  Category 1: Tests, documents, or independent historian(s)  Any combination of 3 from the following:   Review of prior external note(s) from each unique source*;  Review of the result(s) of each unique test*;   Ordering of each unique test*;   Assessment requiring an independent historian(s)    or   Category 2: Independent interpretation of tests   Independent interpretation of a test performed by another physician/other qualified health care professional (not separately reported);     or  Category 3: Discussion of management or test interpretation  Discussion of management or test interpretation with external physician/other qualified health care professional/appropriate source (not separately reported)   High risk of morbidity from additional diagnostic testing or treatment  Examples only:  Drug therapy requiring intensive monitoring for toxicity  Decision regarding elective major surgery with identified patient or procedure risk factors  Decision regarding emergency major surgery  Decision regarding hospitalization  Decision not to resuscitate or to de-escalate care because of poor prognosis             I have personally performed a face-to-face diagnostic evaluation and management  service on this patient. I have independently seen the patient. I have independently obtained the above history from the patient/family. I have independently examined the patient with above findings. I have independently reviewed data/labs for this patient and developed the above plan of care (MDM). Signed: Mateo Rodriguez.  Mely Beach MD, FACS

## 2022-04-23 LAB
ALBUMIN SERPL-MCNC: 2.4 G/DL (ref 3.2–4.6)
ALBUMIN/GLOB SERPL: 0.8 {RATIO} (ref 1.2–3.5)
ALP SERPL-CCNC: 60 U/L (ref 50–136)
ALT SERPL-CCNC: 75 U/L (ref 12–65)
AMMONIA PLAS-SCNC: 31 UMOL/L (ref 24.9–68)
ANION GAP SERPL CALC-SCNC: 3 MMOL/L (ref 7–16)
AST SERPL-CCNC: 17 U/L (ref 15–37)
BILIRUB SERPL-MCNC: 0.6 MG/DL (ref 0.2–1.1)
BUN SERPL-MCNC: 12 MG/DL (ref 8–23)
CALCIUM SERPL-MCNC: 8.8 MG/DL (ref 8.3–10.4)
CHLORIDE SERPL-SCNC: 108 MMOL/L (ref 98–107)
CO2 SERPL-SCNC: 30 MMOL/L (ref 21–32)
CREAT SERPL-MCNC: 1.03 MG/DL (ref 0.8–1.5)
ERYTHROCYTE [DISTWIDTH] IN BLOOD BY AUTOMATED COUNT: 13.9 % (ref 11.9–14.6)
GLOBULIN SER CALC-MCNC: 3.2 G/DL (ref 2.3–3.5)
GLUCOSE BLD STRIP.AUTO-MCNC: 108 MG/DL (ref 65–100)
GLUCOSE BLD STRIP.AUTO-MCNC: 115 MG/DL (ref 65–100)
GLUCOSE BLD STRIP.AUTO-MCNC: 125 MG/DL (ref 65–100)
GLUCOSE BLD STRIP.AUTO-MCNC: 94 MG/DL (ref 65–100)
GLUCOSE SERPL-MCNC: 126 MG/DL (ref 65–100)
HAV IGM SER QL: NONREACTIVE
HBV CORE IGM SER QL: NONREACTIVE
HBV SURFACE AG SER QL: NONREACTIVE
HCT VFR BLD AUTO: 37.4 % (ref 41.1–50.3)
HCV AB SER QL: NONREACTIVE
HGB BLD-MCNC: 12.4 G/DL (ref 13.6–17.2)
INR PPP: 1
MCH RBC QN AUTO: 32.5 PG (ref 26.1–32.9)
MCHC RBC AUTO-ENTMCNC: 33.2 G/DL (ref 31.4–35)
MCV RBC AUTO: 97.9 FL (ref 79.6–97.8)
NRBC # BLD: 0 K/UL (ref 0–0.2)
PLATELET # BLD AUTO: 187 K/UL (ref 150–450)
PMV BLD AUTO: 8.7 FL (ref 9.4–12.3)
POTASSIUM SERPL-SCNC: 4 MMOL/L (ref 3.5–5.1)
PROT SERPL-MCNC: 5.6 G/DL (ref 6.3–8.2)
PROTHROMBIN TIME: 13.5 SEC (ref 12.6–14.5)
RBC # BLD AUTO: 3.82 M/UL (ref 4.23–5.6)
SERVICE CMNT-IMP: ABNORMAL
SERVICE CMNT-IMP: NORMAL
SODIUM SERPL-SCNC: 141 MMOL/L (ref 136–145)
WBC # BLD AUTO: 6.4 K/UL (ref 4.3–11.1)

## 2022-04-23 PROCEDURE — 36415 COLL VENOUS BLD VENIPUNCTURE: CPT

## 2022-04-23 PROCEDURE — 74011250637 HC RX REV CODE- 250/637: Performed by: FAMILY MEDICINE

## 2022-04-23 PROCEDURE — 74011000258 HC RX REV CODE- 258: Performed by: SURGERY

## 2022-04-23 PROCEDURE — 74011000250 HC RX REV CODE- 250: Performed by: SURGERY

## 2022-04-23 PROCEDURE — 74011250636 HC RX REV CODE- 250/636: Performed by: SURGERY

## 2022-04-23 PROCEDURE — 82962 GLUCOSE BLOOD TEST: CPT

## 2022-04-23 PROCEDURE — 80074 ACUTE HEPATITIS PANEL: CPT

## 2022-04-23 PROCEDURE — 97530 THERAPEUTIC ACTIVITIES: CPT

## 2022-04-23 PROCEDURE — 74011000258 HC RX REV CODE- 258: Performed by: FAMILY MEDICINE

## 2022-04-23 PROCEDURE — 85610 PROTHROMBIN TIME: CPT

## 2022-04-23 PROCEDURE — 74011250636 HC RX REV CODE- 250/636: Performed by: FAMILY MEDICINE

## 2022-04-23 PROCEDURE — 74011636637 HC RX REV CODE- 636/637: Performed by: SURGERY

## 2022-04-23 PROCEDURE — 82140 ASSAY OF AMMONIA: CPT

## 2022-04-23 PROCEDURE — 85027 COMPLETE CBC AUTOMATED: CPT

## 2022-04-23 PROCEDURE — 65270000029 HC RM PRIVATE

## 2022-04-23 PROCEDURE — 97110 THERAPEUTIC EXERCISES: CPT

## 2022-04-23 PROCEDURE — 80053 COMPREHEN METABOLIC PANEL: CPT

## 2022-04-23 RX ORDER — HYDROMORPHONE HYDROCHLORIDE 1 MG/ML
1 INJECTION, SOLUTION INTRAMUSCULAR; INTRAVENOUS; SUBCUTANEOUS
Status: DISCONTINUED | OUTPATIENT
Start: 2022-04-23 | End: 2022-04-28

## 2022-04-23 RX ORDER — HYDROMORPHONE HYDROCHLORIDE 1 MG/ML
0.5 INJECTION, SOLUTION INTRAMUSCULAR; INTRAVENOUS; SUBCUTANEOUS
Status: DISCONTINUED | OUTPATIENT
Start: 2022-04-23 | End: 2022-04-28

## 2022-04-23 RX ORDER — HYDROMORPHONE HYDROCHLORIDE 1 MG/ML
0.5 INJECTION, SOLUTION INTRAMUSCULAR; INTRAVENOUS; SUBCUTANEOUS
Status: DISCONTINUED | OUTPATIENT
Start: 2022-04-23 | End: 2022-04-23

## 2022-04-23 RX ADMIN — PIPERACILLIN AND TAZOBACTAM 3.38 G: 3; .375 INJECTION, POWDER, LYOPHILIZED, FOR SOLUTION INTRAVENOUS at 00:30

## 2022-04-23 RX ADMIN — SODIUM CHLORIDE, PRESERVATIVE FREE 10 ML: 5 INJECTION INTRAVENOUS at 05:33

## 2022-04-23 RX ADMIN — PIPERACILLIN AND TAZOBACTAM 3.38 G: 3; .375 INJECTION, POWDER, LYOPHILIZED, FOR SOLUTION INTRAVENOUS at 17:47

## 2022-04-23 RX ADMIN — THIAMINE HYDROCHLORIDE 100 MG: 100 INJECTION, SOLUTION INTRAMUSCULAR; INTRAVENOUS at 09:19

## 2022-04-23 RX ADMIN — ONDANSETRON 4 MG: 2 INJECTION INTRAMUSCULAR; INTRAVENOUS at 03:27

## 2022-04-23 RX ADMIN — SODIUM CHLORIDE, PRESERVATIVE FREE 20 MG: 5 INJECTION INTRAVENOUS at 09:18

## 2022-04-23 RX ADMIN — PIPERACILLIN AND TAZOBACTAM 3.38 G: 3; .375 INJECTION, POWDER, LYOPHILIZED, FOR SOLUTION INTRAVENOUS at 09:19

## 2022-04-23 RX ADMIN — HYDROMORPHONE HYDROCHLORIDE 1 MG: 1 INJECTION, SOLUTION INTRAMUSCULAR; INTRAVENOUS; SUBCUTANEOUS at 09:18

## 2022-04-23 RX ADMIN — HYDROMORPHONE HYDROCHLORIDE 1 MG: 1 INJECTION, SOLUTION INTRAMUSCULAR; INTRAVENOUS; SUBCUTANEOUS at 13:44

## 2022-04-23 RX ADMIN — HYDROMORPHONE HYDROCHLORIDE 1 MG: 1 INJECTION, SOLUTION INTRAMUSCULAR; INTRAVENOUS; SUBCUTANEOUS at 03:26

## 2022-04-23 RX ADMIN — Medication 5 UNITS: at 09:00

## 2022-04-23 RX ADMIN — DEXTROSE MONOHYDRATE, SODIUM CHLORIDE, AND POTASSIUM CHLORIDE 125 ML/HR: 50; 4.5; 1.49 INJECTION, SOLUTION INTRAVENOUS at 07:33

## 2022-04-23 RX ADMIN — SODIUM CHLORIDE, PRESERVATIVE FREE 10 ML: 5 INJECTION INTRAVENOUS at 14:00

## 2022-04-23 RX ADMIN — HYDROMORPHONE HYDROCHLORIDE 0.5 MG: 1 INJECTION, SOLUTION INTRAMUSCULAR; INTRAVENOUS; SUBCUTANEOUS at 21:23

## 2022-04-23 RX ADMIN — HYDROMORPHONE HYDROCHLORIDE 0.5 MG: 1 INJECTION, SOLUTION INTRAMUSCULAR; INTRAVENOUS; SUBCUTANEOUS at 17:47

## 2022-04-23 RX ADMIN — DEXTROSE MONOHYDRATE, SODIUM CHLORIDE, AND POTASSIUM CHLORIDE 125 ML/HR: 50; 4.5; 1.49 INJECTION, SOLUTION INTRAVENOUS at 15:52

## 2022-04-23 RX ADMIN — ENOXAPARIN SODIUM 40 MG: 40 INJECTION SUBCUTANEOUS at 09:19

## 2022-04-23 RX ADMIN — PHENOL 1 SPRAY: 1.5 LIQUID ORAL at 22:04

## 2022-04-23 NOTE — PROGRESS NOTES
Problem: Falls - Risk of  Goal: *Absence of Falls  Description: Document Cindia Neigh Fall Risk and appropriate interventions in the flowsheet.   Outcome: Progressing Towards Goal  Note: Fall Risk Interventions:            Medication Interventions: Patient to call before getting OOB    Elimination Interventions: Patient to call for help with toileting needs,Call light in reach              Problem: Patient Education: Go to Patient Education Activity  Goal: Patient/Family Education  Outcome: Progressing Towards Goal     Problem: Patient Education: Go to Patient Education Activity  Goal: Patient/Family Education  Outcome: Progressing Towards Goal     Problem: Small Bowel Obstruction: Day 1  Goal: Off Pathway (Use only if patient is Off Pathway)  Outcome: Progressing Towards Goal  Goal: Activity/Safety  Outcome: Progressing Towards Goal  Goal: Consults, if ordered  Outcome: Progressing Towards Goal  Goal: Diagnostic Test/Procedures  Outcome: Progressing Towards Goal  Goal: Nutrition/Diet  Outcome: Progressing Towards Goal  Goal: Medications  Outcome: Progressing Towards Goal  Goal: Respiratory  Outcome: Progressing Towards Goal  Goal: Treatments/Interventions/Procedures  Outcome: Progressing Towards Goal  Goal: Psychosocial  Outcome: Progressing Towards Goal  Goal: *Optimal pain control at patient's stated goal  Outcome: Progressing Towards Goal  Goal: *Adequate urinary output (equal to or greater than 30 milliliters/hour)  Outcome: Progressing Towards Goal  Goal: *Hemodynamically stable  Outcome: Progressing Towards Goal  Goal: *Demonstrates progressive activity  Outcome: Progressing Towards Goal  Goal: *Absence of nausea/vomiting  Outcome: Progressing Towards Goal     Problem: Small Bowel Obstruction: Day 2  Goal: Off Pathway (Use only if patient is Off Pathway)  Outcome: Progressing Towards Goal  Goal: Activity/Safety  Outcome: Progressing Towards Goal  Goal: Consults, if ordered  Outcome: Progressing Towards Goal  Goal: Diagnostic Test/Procedures  Outcome: Progressing Towards Goal  Goal: Nutrition/Diet  Outcome: Progressing Towards Goal  Goal: Discharge Planning  Outcome: Progressing Towards Goal  Goal: Medications  Outcome: Progressing Towards Goal  Goal: Respiratory  Outcome: Progressing Towards Goal  Goal: Treatments/Interventions/Procedures  Outcome: Progressing Towards Goal  Goal: Psychosocial  Outcome: Progressing Towards Goal  Goal: *Optimal pain control at patient's stated goal  Outcome: Progressing Towards Goal  Goal: *Adequate urinary output (equal to or greater than 30 milliliters/hour)  Outcome: Progressing Towards Goal  Goal: *Hemodynamically stable  Outcome: Progressing Towards Goal  Goal: *Demonstrates progressive activity  Outcome: Progressing Towards Goal  Goal: *Absence of nausea/vomiting  Outcome: Progressing Towards Goal  Goal: *Return of normal bowel function  Outcome: Progressing Towards Goal     Problem: Small Bowel Obstruction: Day 3  Goal: Off Pathway (Use only if patient is Off Pathway)  Outcome: Progressing Towards Goal  Goal: Activity/Safety  Outcome: Progressing Towards Goal  Goal: Consults, if ordered  Outcome: Progressing Towards Goal  Goal: Diagnostic Test/Procedures  Outcome: Progressing Towards Goal  Goal: Nutrition/Diet  Outcome: Progressing Towards Goal  Goal: Discharge Planning  Outcome: Progressing Towards Goal  Goal: Medications  Outcome: Progressing Towards Goal  Goal: Respiratory  Outcome: Progressing Towards Goal  Goal: Treatments/Interventions/Procedures  Outcome: Progressing Towards Goal  Goal: Psychosocial  Outcome: Progressing Towards Goal  Goal: *Optimal pain control at patient's stated goal  Outcome: Progressing Towards Goal  Goal: *Adequate urinary output (equal to or greater than 30 milliliters/hour)  Outcome: Progressing Towards Goal  Goal: *Hemodynamically stable  Outcome: Progressing Towards Goal  Goal: *Adequate nutrition  Outcome: Progressing Towards Goal  Goal: *Demonstrates progressive activity  Outcome: Progressing Towards Goal  Goal: *Participates in discharge planning  Outcome: Progressing Towards Goal     Problem: Small Bowel Obstruction: Day 4 to Discharge  Goal: Off Pathway (Use only if patient is Off Pathway)  Outcome: Progressing Towards Goal  Goal: Activity/Safety  Outcome: Progressing Towards Goal  Goal: Nutrition/Diet  Outcome: Progressing Towards Goal  Goal: Discharge Planning  Outcome: Progressing Towards Goal  Goal: Medications  Outcome: Progressing Towards Goal  Goal: Respiratory  Outcome: Progressing Towards Goal  Goal: Treatments/Interventions/Procedures  Outcome: Progressing Towards Goal  Goal: Psychosocial  Outcome: Progressing Towards Goal

## 2022-04-23 NOTE — PROGRESS NOTES
Hospitalist Progress Note   Admit Date:  2022  4:44 PM   Name:  Nadja Manrique Age:  80 y.o. Sex:  male  :  1950   MRN:  [id number]   Room:  [facility]/01    Presenting Complaint: Abdominal Pain and Abnormal Lab Results    Reason(s) for Admission: SBO (small bowel obstruction) [facility] Course & Interval History:   Nadja Manrique is a 80 y.o. male with medical history of GERD s/p nissen fundoplication , thyroid CA s/p resection, HTN, CAD who presented with epigastrict abdominal pain and RUQ pain that started around 4pm yesterday. Has not passed gas or had a BM since onset of symptoms. He had an outpatient CT today showing high grade SBO @ [facility] but wanted to be admitted at [facility] so he drove here. Labs significant for WBC 19K with neutrophile predominance. rec'd zosyn in ED. General surgery was notified and requested hospitalist admission. Patient in severe discomfort on my eval. Family at bedside. No PO intake > 24 hours    He is s/p Nissen fundoplication performed in  by Dr. Pool Zapata at an outside hospital and reports he has been unable to vomit since then. He is s/p laparoscopic cholecystectomy  by Dr. Pool Zapata  He is s/p open appendectomy . He is s/p colonoscopy in 2019 and reports this was normal with his next planned in 5 years.     OSH CT a/p - Findings consistent with high-grade small bowel obstruction with transition point within the right upper quadrant. Admitted NPO, NGT, IVF, Zosyn with no improvement overnight and urgently transferred to OR. Now s/p expl laparotomy and reduction of internal hernia for high grade SBO on 22. Small bowel entrapped in the hernia was ischemic and injured but not resected and looked injury looked reversible but will take some time per , surgery. Subjective/24hr Events (22):  POD2 pain controlled. No BM or flatus. Sitting in [address] [place] [address] chair.    ROS:  10 systems reviewed and negative except as noted above. Assessment & Plan:     High grade SBO with SIRS - refractory to bowel compression and rest. Now s/p expl laparotomy and reduction of internal hernia for high grade SBO on 4/21/22. Small bowel entrapped in the hernia was ischemic and injured but not resected and looked injury looked reversible but will take some time per , surgery. NGT to LIS. Cont. IVF. Zosyn. Consult RD for TPN via PIV. No PO intake since 4/18. IV thiaminex3 doses started to prevent WE while NPO. Last dose tomorrow. Acute liver injury -  improving. Suspect 2/2 early sepsis from ischemic bowel. RUQ US showed normal size liver and heterogenous appearance and some contour nodularity suggested. No h/o alcohol abuse. H/o hepatoxic agent accicdentally taken double the dose years ago and was closely monitored for hepatic failure thereafter. Acute hepatitis panel negative. LFTs nearly normalized s/p surgery, abx and IVF. Trend CMP. Lab Results   Component Value Date/Time    ALT (SGPT) 75 (H) 04/23/2022 06:08 AM    AST (SGOT) 17 04/23/2022 06:08 AM    Alk. phosphatase 60 04/23/2022 06:08 AM    Bilirubin, total 0.6 04/23/2022 06:08 AM       GINNY 2/2 ATN - improved with IVF. Now back to baseline Scr ~1.0. cont. IVF, supportive care. Lab Results   Component Value Date/Time    Creatinine 1.03 04/23/2022 06:08 AM     Hyperglycemia - 2/2 severe illness, fasting  on admission now improved. a1c 5.6%. DC lantus 5 U daily and cont SSI q6h. Postsurgical hypothyroidism - hold hormone replacement at this time due to NPO state. pendin gclinical course will start IV      HTN - hold home meds.      CAD - BB held due to bradycardia. Other meds held due to NPO state.        Chronic coronary artery disease       Papillary thyroid carcinoma, multifocal (3 tumors in the left lobe, largest tumor 0.9 cm), status post thyroidectomy/central lymph node dissection 5/26/2015 and 54.5 mCi iodine-131     - follows with endocrinology outpatient. GERD s/p buzz fundoplication - IV pepcid            Discharge Planning:      Not stable     Diet:  DIET NPO  DVT PPx: per surgery   Code status: Full Code    Hospital Problems as of 4/23/2022 Date Reviewed: 12/14/2021          Codes Class Noted - Resolved POA    Leukocytosis ICD-10-CM: D72.829  ICD-9-CM: 288.60  4/21/2022 - Present Yes        Generalized abdominal pain ICD-10-CM: R10.84  ICD-9-CM: 789.07  4/21/2022 - Present Yes        Transaminitis ICD-10-CM: R74.01  ICD-9-CM: 790.4  4/21/2022 - Present No        Acute kidney injury (GINNY) with acute tubular necrosis (ATN) ([facility] ICD-10-CM: N17.0  ICD-9-CM: 584.5  4/21/2022 - Present Yes        * (Principal) SBO (small bowel obstruction) ([facility] ICD-10-CM: O06.323  ICD-9-CM: 560.9  4/20/2022 - Present Yes    Overview Addendum 4/21/2022  1:49 PM by Mikaela Dwyer MD     4/21/22 s/p expl lap and reduction of internal hernia for high grade SBO; Dr Margarita Truong             Postsurgical hypothyroidism ICD-10-CM: E89.0  ICD-9-CM: 244.0  Unknown - Present Yes        Papillary thyroid carcinoma, multifocal (3 tumors in the left lobe, largest tumor 0.9 cm), status post thyroidectomy/central lymph node dissection 5/26/2015 and 54.5 mCi iodine-131 7/22/2015 ICD-10-CM: C73  ICD-9-CM: 462  6/21/2016 - Present Yes        GERD (gastroesophageal reflux disease) ICD-10-CM: K21.9  ICD-9-CM: 530.81  Unknown - Present Yes        Chronic coronary artery disease ICD-10-CM: I25.10  ICD-9-CM: 414.00  9/3/2015 - Present Yes    Overview Signed 6/21/2016  8:29 AM by Randall JOHNS     Overview:   BMS RCA 2008; NORMAL STRESS TEST 2014    Last Assessment & Plan:   He does not have any anginal quality chest pain. He occasionally has sharp atypical pain. His stress test was normal less than a year ago. I don't plan any further investigation.                    Objective:     Patient Vitals for the past 24 hrs:   Temp Pulse Resp BP SpO2   04/23/22 1103 98.2 °F (36.8 °C) (!) 56 18 (!) 161/73 96 %   04/23/22 0724 98.5 °F (36.9 °C) (!) 59 16 (!) 155/78 97 %   04/23/22 0326 98.2 °F (36.8 °C) 74 16 (!) 172/74 93 %   04/22/22 2214 98.2 °F (36.8 °C) 63 18 (!) 157/72 95 %   04/22/22 2000 99.2 °F (37.3 °C) 69 16 (!) 160/79 95 %   04/22/22 1623 98 °F (36.7 °C) 75 18 (!) 179/76 95 %     Oxygen Therapy  O2 Sat (%): 96 % (04/23/22 1103)  O2 Device: None (Room air) (04/23/22 1103)  O2 Flow Rate (L/min): 10 l/min (04/21/22 1416)    Estimated body mass index is 27.06 kg/m² as calculated from the following:    Height as of this encounter: 5' 8\" (1.727 m). Weight as of this encounter: 80.7 kg (178 lb). Intake/Output Summary (Last 24 hours) at 4/23/2022 1339  Last data filed at 4/23/2022 0326  Gross per 24 hour   Intake 100 ml   Output 700 ml   Net -600 ml         Physical Exam:     Blood pressure (!) 161/73, pulse (!) 56, temperature 98.2 °F (36.8 °C), resp. rate 18, height 5' 8\" (1.727 m), weight 80.7 kg (178 lb), SpO2 96 %. General:    Ill appearing. Resting OOB recliner   Head:  Normocephalic, atraumatic  Eyes:  Sclerae appear normal.  Pupils equally round. ENT:  NGT in place. Neck:  No restricted ROM. Trachea midline   CV:   RRR. No m/r/g. No jugular venous distension. Lungs:   CTAB. No wheezing, rhonchi, or rales. Respirations even, unlabored  Abdomen: Bowel sounds hypoactive. Soft. Distended. tender  Extremities: No cyanosis or clubbing. No edema  Skin:     No rashes and normal coloration. Warm and dry. Neuro:  CN II-XII grossly intact. Sensation intact. A&Ox3  Psych:  Normal mood and affect.       I have reviewed ordered lab tests and independently visualized imaging below:    Recent Labs:  Recent Results (from the past 48 hour(s))   GLUCOSE, POC    Collection Time: 04/21/22  5:46 PM   Result Value Ref Range    Glucose (POC) 114 (H) 65 - 100 mg/dL    Performed by Jane    GLUCOSE, POC    Collection Time: 04/22/22 12:24 AM   Result Value Ref Range Glucose (POC) 148 (H) 65 - 100 mg/dL    Performed by Cristofer    GLUCOSE, POC    Collection Time: 04/22/22  6:12 AM   Result Value Ref Range    Glucose (POC) 122 (H) 65 - 100 mg/dL    Performed by Kelsi    CBC W/O DIFF    Collection Time: 04/22/22  6:20 AM   Result Value Ref Range    WBC 6.4 4.3 - 11.1 K/uL    RBC 4.24 4.23 - 5.6 M/uL    HGB 13.4 (L) 13.6 - 17.2 g/dL    HCT 41.1 41.1 - 50.3 %    MCV 96.9 79.6 - 97.8 FL    MCH 31.6 26.1 - 32.9 PG    MCHC 32.6 31.4 - 35.0 g/dL    RDW 14.4 11.9 - 14.6 %    PLATELET 457 385 - 755 K/uL    MPV 9.2 (L) 9.4 - 12.3 FL    ABSOLUTE NRBC 0.00 0.0 - 0.2 K/uL   METABOLIC PANEL, BASIC    Collection Time: 04/22/22  6:20 AM   Result Value Ref Range    Sodium 144 136 - 145 mmol/L    Potassium 4.6 3.5 - 5.1 mmol/L    Chloride 112 (H) 98 - 107 mmol/L    CO2 28 21 - 32 mmol/L    Anion gap 4 (L) 7 - 16 mmol/L    Glucose 142 (H) 65 - 100 mg/dL    BUN 24 (H) 8 - 23 MG/DL    Creatinine 1.41 0.8 - 1.5 MG/DL    GFR est AA >60 >60 ml/min/1.73m2    GFR est non-AA 53 (L) >60 ml/min/1.73m2    Calcium 8.5 8.3 - 10.4 MG/DL   GLUCOSE, POC    Collection Time: 04/22/22  3:07 PM   Result Value Ref Range    Glucose (POC) 113 (H) 65 - 100 mg/dL    Performed by Jane    GLUCOSE, POC    Collection Time: 04/23/22 12:22 AM   Result Value Ref Range    Glucose (POC) 115 (H) 65 - 100 mg/dL    Performed by Gualberto Beckett    CBC W/O DIFF    Collection Time: 04/23/22  6:08 AM   Result Value Ref Range    WBC 6.4 4.3 - 11.1 K/uL    RBC 3.82 (L) 4.23 - 5.6 M/uL    HGB 12.4 (L) 13.6 - 17.2 g/dL    HCT 37.4 (L) 41.1 - 50.3 %    MCV 97.9 (H) 79.6 - 97.8 FL    MCH 32.5 26.1 - 32.9 PG    MCHC 33.2 31.4 - 35.0 g/dL    RDW 13.9 11.9 - 14.6 %    PLATELET 922 693 - 907 K/uL    MPV 8.7 (L) 9.4 - 12.3 FL    ABSOLUTE NRBC 0.00 0.0 - 0.2 K/uL   METABOLIC PANEL, COMPREHENSIVE    Collection Time: 04/23/22  6:08 AM   Result Value Ref Range    Sodium 141 136 - 145 mmol/L    Potassium 4.0 3.5 - 5.1 mmol/L    Chloride 108 (H) 98 - 107 mmol/L    CO2 30 21 - 32 mmol/L    Anion gap 3 (L) 7 - 16 mmol/L    Glucose 126 (H) 65 - 100 mg/dL    BUN 12 8 - 23 MG/DL    Creatinine 1.03 0.8 - 1.5 MG/DL    GFR est AA >60 >60 ml/min/1.73m2    GFR est non-AA >60 >60 ml/min/1.73m2    Calcium 8.8 8.3 - 10.4 MG/DL    Bilirubin, total 0.6 0.2 - 1.1 MG/DL    ALT (SGPT) 75 (H) 12 - 65 U/L    AST (SGOT) 17 15 - 37 U/L    Alk.  phosphatase 60 50 - 136 U/L    Protein, total 5.6 (L) 6.3 - 8.2 g/dL    Albumin 2.4 (L) 3.2 - 4.6 g/dL    Globulin 3.2 2.3 - 3.5 g/dL    A-G Ratio 0.8 (L) 1.2 - 3.5     AMMONIA    Collection Time: 04/23/22  6:08 AM   Result Value Ref Range    Ammonia, plasma 31 24.9 - 68 UMOL/L   PROTHROMBIN TIME + INR    Collection Time: 04/23/22  6:08 AM   Result Value Ref Range    Prothrombin time 13.5 12.6 - 14.5 sec    INR 1.0     HEPATITIS PANEL, ACUTE    Collection Time: 04/23/22  6:08 AM   Result Value Ref Range    Hepatitis A, IgM NONREACTIVE NR      Hepatitis B core, IgM NONREACTIVE NR      Hep B Surface Ag NONREACTIVE NR      Hepatitis C virus Ab NONREACTIVE NR     GLUCOSE, POC    Collection Time: 04/23/22  6:31 AM   Result Value Ref Range    Glucose (POC) 125 (H) 65 - 100 mg/dL    Performed by Cristofer    GLUCOSE, POC    Collection Time: 04/23/22 11:39 AM   Result Value Ref Range    Glucose (POC) 108 (H) 65 - 100 mg/dL    Performed by Sherwin        All Micro Results     Procedure Component Value Units Date/Time    CULTURE, BLOOD [[id number]] Collected: 04/20/22 3251    Order Status: Completed Specimen: Blood Updated: 04/23/22 3610     Special Requests: --        NO SPECIAL REQUESTS  LEFT  Antecubital       Culture result: NO GROWTH 3 DAYS       CULTURE, BLOOD [[id number]] Collected: 04/20/22 1907    Order Status: Completed Specimen: Blood Updated: 04/23/22 0325     Special Requests: --        RIGHT  FOREARM       Culture result: NO GROWTH 3 DAYS             Other Studies:  No results found.    Current Meds:  Current Facility-Administered Medications   Medication Dose Route Frequency    HYDROmorphone (DILAUDID) injection 0.5 mg  0.5 mg IntraVENous Q6H PRN    NUTRITIONAL SUPPORT ELECTROLYTE PRN ORDERS   Does Not Apply PRN    dextrose 5% - 0.45% NaCl with KCl 20 mEq/L infusion  125 mL/hr IntraVENous CONTINUOUS    thiamine (B-1) 100 mg in 0.9% sodium chloride 50 mL IVPB  100 mg IntraVENous DAILY    HYDROmorphone (DILAUDID) injection 1 mg  1 mg IntraVENous Q3H PRN    insulin glargine (LANTUS) injection 5 Units  5 Units SubCUTAneous DAILY    lidocaine (XYLOCAINE) 10 mg/mL (1 %) injection 0.1 mL  0.1 mL SubCUTAneous PRN    sodium chloride (NS) flush 5-40 mL  5-40 mL IntraVENous Q8H    sodium chloride (NS) flush 5-40 mL  5-40 mL IntraVENous PRN    acetaminophen (TYLENOL) tablet 650 mg  650 mg Oral Q6H PRN    Or    acetaminophen (TYLENOL) suppository 650 mg  650 mg Rectal Q6H PRN    ondansetron (ZOFRAN) injection 4 mg  4 mg IntraVENous Q6H PRN    enoxaparin (LOVENOX) injection 40 mg  40 mg SubCUTAneous DAILY    piperacillin-tazobactam (ZOSYN) 3.375 g in 0.9% sodium chloride (MBP/ADV) 100 mL MBP  3.375 g IntraVENous Q8H    [Held by provider] metoprolol (LOPRESSOR) injection 2.5 mg  2.5 mg IntraVENous Q8H    glucose chewable tablet 16 g  16 g Oral PRN    glucagon (GLUCAGEN) injection 1 mg  1 mg IntraMUSCular PRN    dextrose 10% infusion 125-250 mL  125-250 mL IntraVENous PRN    insulin regular (NOVOLIN R, HUMULIN R) injection   SubCUTAneous Q6H    famotidine (PF) (PEPCID) 20 mg in 0.9% sodium chloride 10 mL injection  20 mg IntraVENous DAILY    naloxone (NARCAN) injection 0.2 mg  0.2 mg IntraVENous EVERY 2 MINUTES AS NEEDED       Signed:  Marcella Shepherd DO    Part of this note may have been written by using a voice dictation software. The note has been proof read but may still contain some grammatical/other typographical errors.

## 2022-04-23 NOTE — PROGRESS NOTES
Nutrition Brief Note    Acknowledge consult for TPN management  (Hospitalist) received after pharmacy \"cut off time\" for TPN. Will order PN labs and PN will start 1800 tomorrow evening. Noted patient with only 1 PIV. If PN is anticipated for more than 1-2 day, would require a central line so can be converted to TPN as PPN will not meet needs.     [address] BERENICE William on 4/23/2022 at 2:18 PM  Contact: [phone]

## 2022-04-23 NOTE — PROGRESS NOTES
Problem: Mobility Impaired (Adult and Pediatric)  Goal: *Acute Goals and Plan of Care (Insert Text)  Outcome: Progressing Towards Goal  Note: DISCHARGE GOALS :  (1.)Mr. Rebecca De Luna will move from supine to sit and sit to supine  with SUPERVISION (flat bed no rail) . (2.)Mr. Rebecca De Luna will transfer from bed to chair and chair to bed with SUPERVISION. (3.)Mr. Rebecca De Luna will ambulate with SUPERVISION for 400 feet . 4) pt able to go up & down 4 steps using a rail & SBA.    ________________________________________________________________________________________________      PHYSICAL THERAPY: Daily Note and PM 4/23/2022  INPATIENT: PT Visit Days : 2  [place] / [place] / Product Type: Medicare /       NAME/AGE/GENDER: Ole Boggs is a 80 y.o. male   PRIMARY DIAGNOSIS: [facility] [K56.609] SBO (small bowel obstruction) (HCC) SBO (small bowel obstruction) (HCC)  Procedure(s) (LRB):  LAPAROTOMY EXPLORATORY POSSIBLE BOWEL RESECTION (N/A)  2 Days Post-Op  ICD-10: Treatment Diagnosis:   · Generalized Muscle Weakness (M62.81)  · Other lack of cordination (R27.8)  · Difficulty in walking, Not elsewhere classified (R26.2)  · Other abnormalities of gait and mobility (R26.89)   Precaution/Allergies:  Patient has no known allergies. ASSESSMENT:     Mr. Rebecca De Luna showed increased gait distance & improved level of assist for transfers & gait. PT focused of standing balance activity & core stability along with issuing a mild LE exercise program. This pt will likely reach PT goals before his hospital DC. This section established at most recent assessment   PROBLEM LIST (Impairments causing functional limitations):  1. Decreased Strength  2. Decreased ADL/Functional Activities  3. Decreased Transfer Abilities  4. Decreased Ambulation Ability/Technique  5. Decreased Balance  6. Increased Pain  7. Decreased Activity Tolerance  8.  Decreased Flexibility/Joint Mobility  9. Decreased [place] with Home Exercise Program   INTERVENTIONS PLANNED: (Benefits and precautions of physical therapy have been discussed with the patient.)  1. Balance Exercise  2. Bed Mobility  3. Gait Training  4. Therapeutic Activites  5. Therapeutic Exercise/Strengthening  6. Transfer Training     TREATMENT PLAN: Frequency/Duration: daily for duration of hospital stay  Rehabilitation Potential For Stated Goals: Good     REHAB RECOMMENDATIONS (at time of discharge pending progress):    Placement: It is my opinion, based on this patient's performance to date, that Mr. Nico Winters may benefit from [address] after discharge due to the functional deficits listed above that are likely to improve with skilled rehabilitation because he/she has multiple medical issues that affect his/her functional mobility in the community. Equipment:    None at this time              HISTORY:   History of Present Injury/Illness (Reason for Referral):  Yuko Staley is a 80 y.o. male with medical history of GERD s/p nissen fundoplication 6675, thyroid CA s/p resection, HTN, CAD who presented with epigastrict abdominal pain and RUQ pain that started around 4pm yesterday. Has not passed gas or had a BM since onset of symptoms. He had an outpatient CT today showing high grade SBO @ [facility] but wanted to be admitted at [facility] so he drove here. Labs significant for WBC 19K with neutrophile predominance. rec'd zosyn in ED. General surgery was notified and requested hospitalist admission. Patient in severe discomfort on my eval. Family at bedside. No PO intake > 24 hours.       Past Medical History/Comorbidities:   Mr. Nico Winters  has a past medical history of Arthritis, Chronic coronary artery disease, Gastroesophageal reflux disease, Gout, Hypercholesterolemia, Hypertension, Malignant melanoma, Papillary thyroid carcinoma, multifocal (3 tumors in the left lobe, largest tumor 0.9 cm), status post thyroidectomy/central lymph node dissection 5/26/2015 and 54.5 mCi iodine-131 7/22/2015, and Postsurgical hypothyroidism. Mr. Sybil Mancilla  has a past surgical history that includes hx coronary stent placement (2010); hx malignant skin lesion excision (2011); hx cholecystectomy (2005); hx knee arthroscopy (Right, 2005); hx tonsillectomy (Age 4); hx appendectomy (1969); hx thyroidectomy (5/26/2015); hx gi (2008); and hx coronary artery bypass graft (08/19/2016). Social History/Living Environment:   Home Environment: Apartment  # Steps to Enter: 4  Rails to Enter: Yes  [employer] : Left  One/Two Story Residence: One story  Living Alone: No  Support Systems: Spouse/Significant Other Chela Lucia [phone])  Patient Expects to be Discharged to[de-identified] Home with family assistance  Current DME Used/Available at Home: None  Prior Level of Function/Work/Activity:  Pt was independent without an assistive device  Personal Factors: Other factors that influence how disability is experienced by the patient:  current  PMH   Number of Personal Factors/Comorbidities that affect the Plan of Care: 3+: HIGH COMPLEXITY   EXAMINATION:   Most Recent Physical Functioning:   Gross Assessment:  AROM: Generally decreased, functional  Strength: Generally decreased, functional  Coordination: Generally decreased, functional                  Balance:  Sitting: Intact; Without support  Standing: Impaired; Without support Bed Mobility:  Supine to Sit:  (NT)  Sit to Supine:  (NT)  Scooting: Stand-by assistance       Transfers:  Sit to Stand: Stand-by assistance  Stand to Sit: Stand-by assistance  Bed to Chair: Stand-by assistance  Duration: 23 Minutes (extra time to work through activity noted)  Gait:     Speed/Christine: Delayed  Step Length: Left shortened;Right shortened  Gait Abnormalities: Decreased step clearance (mild unsteadiness)  Distance (ft): 320 Feet (ft)  Assistive Device:  (none)  Ambulation - Level of Assistance: Stand-by assistance  Number of Stairs Trained: 9  Stairs - Level of Assistance: Stand-by assistance  Rail Use: Left   Home Environment: [address] Environment: Apartment  # Steps to Enter: 4  Rails to Enter: Yes  Hand Rails : Left  One/Two Story Residence: One story  Living Alone: No  Support Systems: Spouse/Significant Other  Patient Expects to be Discharged to[de-identified] Home  Current DME Used/Available at Home: None   Functional Mobility:         Gait/Ambulation: sba        Transfers: sba        Bed Mobility:  NT   Body Structures Involved:  1. Digestive Structures  2. Muscles Body Functions Affected:  1. Movement Related  2. Digestive Activities and Participation Affected:  1. General Tasks and Demands  2. Mobility   Number of elements that affect the Plan of Care: 4+: HIGH COMPLEXITY   CLINICAL PRESENTATION:   Presentation: Stable and uncomplicated: LOW COMPLEXITY   CLINICAL DECISION MAKING:   [facility] AM-PAC 6 Clicks   Basic Mobility Inpatient Short Form  How much difficulty does the patient currently have. .. Unable A Lot A Little None   1. Turning over in bed (including adjusting bedclothes, sheets and blankets)? [] 1   [] 2   [x] 3   [] 4   2. Sitting down on and standing up from a chair with arms ( e.g., wheelchair, bedside commode, etc.)   [] 1   [] 2   [x] 3   [] 4   3. Moving from lying on back to sitting on the side of the bed? [] 1   [] 2   [x] 3   [] 4   How much help from another person does the patient currently need. .. Total A Lot A Little None   4. Moving to and from a bed to a chair (including a wheelchair)? [] 1   [] 2   [x] 3   [] 4   5. Need to walk in hospital room? [] 1   [] 2   [x] 3   [] 4   6. Climbing 3-5 steps with a railing? [x] 1   [] 2   [] 3   [] 4   © 2007, Trustees of [facility], under license to [employer].  All rights reserved      Score:  Initial: 16 Most Recent: X (Date: -- )    Interpretation of Tool:  Represents activities that are increasingly more difficult (i.e. Bed mobility, Transfers, Gait). Medical Necessity:     · Patient is expected to demonstrate progress in   · strength, range of motion, balance, coordination, and functional technique  ·  to   · decrease assistance required with bed mobility, transfers & gait  · .  Reason for Services/Other Comments:  · Patient continues to require skilled intervention due to   · Pt not independent with functional mobility  · . Use of outcome tool(s) and clinical judgement create a POC that gives a: Clear prediction of patient's progress: LOW COMPLEXITY            TREATMENT:   (In addition to Assessment/Re-Assessment sessions the following treatments were rendered)   Pre-treatment Symptoms/Complaints: feeling better  Pain: Initial: numeric scale  Pain Intensity 1: 3  Pain Location 1: Abdomen  Pain Orientation 1: Mid  Pain Intervention(s) 1: Ambulation/Increased Activity  Post Session:  3/10     Therapeutic Activity: (  23 Minutes (extra time to work through activity noted) ):  Therapeutic activities including standing balance activity without UE support (repeated reaching in front & overhead, repeated chop lift to R & L, repeated box stepping) progressive gait training & stair training , reviewed HEP for LE's with written guidelines provided to improve mobility, strength, balance, coordination and dynamic movement of arm - bilateral, leg - bilateral and core to improve functional endurance & stability. Braces/Orthotics/Lines/Etc:   · IV  Treatment/Session Assessment:    · Response to Treatment:  Steady progress, pt in good spirits  · Interdisciplinary Collaboration:   o Registered Nurse  · After treatment position/precautions:   o Up in chair  o Bed/Chair-wheels locked  o Caregiver at bedside  o Call light within reach  o RN notified  o Family at bedside   · Compliance with Program/Exercises: Compliant all of the time  · Recommendations/Intent for next treatment session:   \"Next visit will focus on reduction in assistance provided\".   Total Treatment Duration:  PT Patient Time In/Time Out  Time In: 1237  Time Out: [address] Marla Stevens PT

## 2022-04-23 NOTE — PROGRESS NOTES
Progress Note    Patient: Clesa Gil. MRN: [id number]  SSN: [id number]    YOB: 1950  Age: 80 y.o. Sex: male      Admit Date: 2022    2 Days Post-Op    Procedure:  Procedure(s):  LAPAROTOMY EXPLORATORY POSSIBLE BOWEL RESECTION    Subjective:     Patient has no new complaints.   Flatus or bowel movements    Objective:     Visit Vitals  BP (!) 161/73 (BP 1 Location: Right upper arm, BP Patient Position: Sitting)   Pulse (!) 56   Temp 98.2 °F (36.8 °C)   Resp 18   Ht 5' 8\" (1.727 m)   Wt 80.7 kg (178 lb)   SpO2 96%   BMI 27.06 kg/m²       Temp (24hrs), Av.4 °F (36.9 °C), Min:98 °F (36.7 °C), Max:99.2 °F (37.3 °C)      Physical Exam:    WDWN in NAD  Abdomen: soft, non-tender   incisions are clean and dry      Assessment:     Hospital Problems  Date Reviewed: 2021          Codes Class Noted POA    Leukocytosis ICD-10-CM: D72.829  ICD-9-CM: 288.60  2022 Yes        Generalized abdominal pain ICD-10-CM: R10.84  ICD-9-CM: 789.07  2022 Yes        Transaminitis ICD-10-CM: R74.01  ICD-9-CM: 790.4  2022 No        Acute kidney injury (GINNY) with acute tubular necrosis (ATN) (HCC) ICD-10-CM: N17.0  ICD-9-CM: 584.5  2022 Yes        * (Principal) SBO (small bowel obstruction) ([facility] ICD-10-CM: H90.390  ICD-9-CM: 560.9  2022 Yes    Overview Addendum 2022  1:49 PM by Alanna Olivares MD     22 s/p expl lap and reduction of internal hernia for high grade SBO; Dr Diallo Jack             Postsurgical hypothyroidism ICD-10-CM: E89.0  ICD-9-CM: 244.0  Unknown Yes        Papillary thyroid carcinoma, multifocal (3 tumors in the left lobe, largest tumor 0.9 cm), status post thyroidectomy/central lymph node dissection 2015 and 54.5 mCi iodine-131 2015 ICD-10-CM: C73  ICD-9-CM: 122  2016 Yes        GERD (gastroesophageal reflux disease) ICD-10-CM: K21.9  ICD-9-CM: 530.81  Unknown Yes        Chronic coronary artery disease ICD-10-CM: I25.10  ICD-9-CM: 414.00 9/3/2015 Yes    Overview Signed 6/21/2016  8:29 AM by Jaime JOHNS     Overview:   BMS RCA 2008; NORMAL STRESS TEST 2014    Last Assessment & Plan:   He does not have any anginal quality chest pain. He occasionally has sharp atypical pain. His stress test was normal less than a year ago. I don't plan any further investigation.                    Plan/Recommendations/Medical Decision Making:     Continue present treatment   Await bowel function return  To NG tube suction and n.p.o. status

## 2022-04-23 NOTE — PROGRESS NOTES
Problem: Self Care Deficits Care Plan (Adult)  Goal: *Acute Goals and Plan of Care (Insert Text)  Outcome: Progressing Towards Goal  Note:   1. Patient will complete lower body dressing with supervision to increase self care independence. 2. Patient will complete bathing with supervision to increase self care independence. 3. Patient will tolerate 40 minutes of OT treatment with self incorporated rest breaks to increase activity tolerance to enhance participation in hobbies. 4. Patient will complete all functional transfers with supervision using adaptive equipment as needed. 5. Patient will complete UE exercises with supervision to increase overall activity tolerance and strength. Timeframe: 7 visits          OCCUPATIONAL THERAPY: Daily Note and PM 4/23/2022  INPATIENT: OT Visit Days: 2  [place] / [place] / Product Type: Medicare /      NAME/AGE/GENDER: Neelam Bermudez is a 80 y.o. male   PRIMARY DIAGNOSIS:  SBO (small bowel obstruction) ([facility] [K56.609] SBO (small bowel obstruction) (HCC) SBO (small bowel obstruction) (HCC)  Procedure(s) (LRB):  LAPAROTOMY EXPLORATORY POSSIBLE BOWEL RESECTION (N/A)  2 Days Post-Op  ICD-10: Treatment Diagnosis:    · Generalized Muscle Weakness (M62.81)  · Other lack of cordination (R27.8)   Precautions/Allergies:     Patient has no known allergies. ASSESSMENT:     Mr. Donna oWng presents SBO and recent bowel resection as resulting dx. Patient able to be removed from wall GI suction with Nurse assistance. Patient is a hard worker and feeling approprietly poor due to dx. He should progress steadily and quickly as he has a high baseline and motivated. Just mild weakness and balance deficits affecting ADL's and mobility. Will do well at home at d/c. Initiate OT.     4/23/22 Pt was sitting in chair upon arrival. Pt had just finished walking with PT. Pt completed the exercises below on B UE's with green thera band. Continue POC. This section established at most recent assessment   PROBLEM LIST (Impairments causing functional limitations):  1. Decreased Strength  2. Decreased ADL/Functional Activities  3. Decreased Balance  4. Decreased Activity Tolerance   INTERVENTIONS PLANNED: (Benefits and precautions of occupational therapy have been discussed with the patient.)  1. Activities of daily living training  2. Neuromuscular re-eduation  3. Therapeutic activity  4. Therapeutic exercise     TREATMENT PLAN: Frequency/Duration: Follow patient 1-2tx to address above goals. Rehabilitation Potential For Stated Goals: Good     REHAB RECOMMENDATIONS (at time of discharge pending progress):    Placement: It is my opinion, based on this patient's performance to date, that Mr. Venita Farrar may benefit from participating in 1-2 additional therapy sessions in order to continue to assess for rehab potential and then make recommendation for disposition at discharge. Equipment:    None at this time              OCCUPATIONAL PROFILE AND HISTORY:   History of Present Injury/Illness (Reason for Referral):  See H&P  Past Medical History/Comorbidities:   Mr. Venita Farrar  has a past medical history of Arthritis, Chronic coronary artery disease, Gastroesophageal reflux disease, Gout, Hypercholesterolemia, Hypertension, Malignant melanoma, Papillary thyroid carcinoma, multifocal (3 tumors in the left lobe, largest tumor 0.9 cm), status post thyroidectomy/central lymph node dissection 5/26/2015 and 54.5 mCi iodine-131 7/22/2015, and Postsurgical hypothyroidism. Mr. Venita Farrar  has a past surgical history that includes hx coronary stent placement (2010); hx malignant skin lesion excision (2011); hx cholecystectomy (2005); hx knee arthroscopy (Right, 2005); hx tonsillectomy (Age 4); hx appendectomy (1969); hx thyroidectomy (5/26/2015); hx gi (2008); and hx coronary artery bypass graft (08/19/2016).   Social History/Living Environment:   Home Environment: Apartment  # Steps to Enter: 4  Rails to Enter: Yes  Hand Rails : Left  One/Two Story Residence: One story  Living Alone: No  Support Systems: Spouse/Significant Other Fritz Santiago [phone])  Patient Expects to be Discharged to[de-identified] Home with family assistance  Current DME Used/Available at Home: None  Prior Level of Function/Work/Activity:  Independent ADL's and IADL's. Enjoys fishing. Drives. Number of Personal Factors/Comorbidities that affect the Plan of Care: Brief history (0):  LOW COMPLEXITY   ASSESSMENT OF OCCUPATIONAL PERFORMANCE[de-identified]   Activities of Daily Living:   Basic ADLs (From Assessment) Complex ADLs (From Assessment)   Feeding: Independent  Oral Facial Hygiene/Grooming: Supervision  Bathing: Contact guard assistance  Upper Body Dressing: Setup  Lower Body Dressing: Contact guard assistance  Toileting: Contact guard assistance     Grooming/Bathing/Dressing Activities of Daily Living                             Bed/Mat Mobility  Supine to Sit:  (NT)  Sit to Supine:  (NT)  Sit to Stand: Stand-by assistance  Stand to Sit: Stand-by assistance  Bed to Chair: Stand-by assistance  Scooting: Stand-by assistance     Most Recent Physical Functioning:   Gross Assessment:                  Posture:     Balance:  Sitting: Intact; Without support  Standing: Impaired; Without support Bed Mobility:  Supine to Sit:  (NT)  Sit to Supine:  (NT)  Scooting: Stand-by assistance  Wheelchair Mobility:     Transfers:  Sit to Stand: Stand-by assistance  Stand to Sit: Stand-by assistance  Bed to Chair: Stand-by assistance  Duration: 23 Minutes (extra time to work through activity noted)            Patient Vitals for the past 6 hrs:   BP BP Patient Position SpO2 Pulse   04/23/22 1103 (!) 161/73 Sitting 96 % (!) 56       Mental Status  Neurologic State: Alert  Orientation Level: Oriented X4  Cognition: Follows commands  Perception: Appears intact  Safety/Judgement: Awareness of environment,Fall prevention            LLE Assessment  LLE Assessment (WDL): Exception to WDL RLE Assessment  RLE Assessment (WDL): Exceptions to [facility]           Physical Skills Involved:  1. Range of Motion  2. Balance  3. Strength  4. Activity Tolerance Cognitive Skills Affected (resulting in the inability to perform in a timely and safe manner):  1. [facility] Psychosocial Skills Affected:  1. WFL   Number of elements that affect the Plan of Care: 1-3:  LOW COMPLEXITY   CLINICAL DECISION MAKIN[address] [place] [address] AM-PAC 6 Clicks   Daily Activity Inpatient Short Form  How much help from another person does the patient currently need. .. Total A Lot A Little None   1. Putting on and taking off regular lower body clothing? [] 1   [] 2   [x] 3   [] 4   2. Bathing (including washing, rinsing, drying)? [] 1   [] 2   [x] 3   [] 4   3. Toileting, which includes using toilet, bedpan or urinal?   [] 1   [] 2   [x] 3   [] 4   4. Putting on and taking off regular upper body clothing? [] 1   [] 2   [] 3   [x] 4   5. Taking care of personal grooming such as brushing teeth? [] 1   [] 2   [] 3   [x] 4   6. Eating meals? [] 1   [] 2   [] 3   [x] 4   © , Trustees of [address] [place] [address], under license to [employer]. All rights reserved      Score:  Initial: 21 Most Recent: X (Date: -- )    Interpretation of Tool:  Represents activities that are increasingly more difficult (i.e. Bed mobility, Transfers, Gait). Medical Necessity:     · Skilled intervention continues to be required due to Deficits noted abvoe. Reason for Services/Other Comments:  · Patient continues to require skilled intervention due to   · Dx above  · .    Use of outcome tool(s) and clinical judgement create a POC that gives a: MODERATE COMPLEXITY         TREATMENT:   (In addition to Assessment/Re-Assessment sessions the following treatments were rendered)     Pre-treatment Symptoms/Complaints:    Pain: Initial:   Pain Intensity 1: 0  Post Session:  6     Therapeutic Exercise: (  10):  Exercises per grid below to improve mobility and strength. Required min visual and verbal cues to promote proper body posture and promote proper body mechanics. Progressed range and repetitions as indicated. B UE's with green thera band  Date:  4/23/22 Date:   Date:     Activity/Exercise Parameters Parameters Parameters   Shoulder hor abd/add  15 reps      Shoulder flex/ex 15 reps      Elbow flex/ex  15 reps      Punches  15 reps                              Braces/Orthotics/Lines/Etc:   · O2 Device: None (Room air)  Treatment/Session Assessment:    · Response to Treatment:  Good, sitting up in recliner  · Interdisciplinary Collaboration:   o Physical Therapist  o Certified Occupational Therapy Assistant  o Registered Nurse  · After treatment position/precautions:   o Up in chair  o Bed in low position  o RN notified   · Compliance with Program/Exercises: Compliant all of the time, Will assess as treatment progresses. · Recommendations/Intent for next treatment session: \"Next visit will focus on advancements to more challenging activities and reduction in assistance provided\".   Total Treatment Duration:  OT Patient Time In/Time Out  Time In: 1300  Time Out: [address]

## 2022-04-23 NOTE — PROGRESS NOTES
Problem: Small Bowel Obstruction: Day 2  Goal: Activity/Safety  Outcome: Progressing Towards Goal  Goal: *Optimal pain control at patient's stated goal  Outcome: Progressing Towards Goal  Goal: *Demonstrates progressive activity  Outcome: Progressing Towards Goal

## 2022-04-24 LAB
ALBUMIN SERPL-MCNC: 2.5 G/DL (ref 3.2–4.6)
ALBUMIN/GLOB SERPL: 0.7 {RATIO} (ref 1.2–3.5)
ALP SERPL-CCNC: 64 U/L (ref 50–136)
ALT SERPL-CCNC: 54 U/L (ref 12–65)
ANION GAP SERPL CALC-SCNC: 3 MMOL/L (ref 7–16)
AST SERPL-CCNC: 15 U/L (ref 15–37)
BILIRUB SERPL-MCNC: 0.8 MG/DL (ref 0.2–1.1)
BUN SERPL-MCNC: 9 MG/DL (ref 8–23)
CALCIUM SERPL-MCNC: 9.2 MG/DL (ref 8.3–10.4)
CHLORIDE SERPL-SCNC: 106 MMOL/L (ref 98–107)
CO2 SERPL-SCNC: 30 MMOL/L (ref 21–32)
CREAT SERPL-MCNC: 1 MG/DL (ref 0.8–1.5)
ERYTHROCYTE [DISTWIDTH] IN BLOOD BY AUTOMATED COUNT: 13.2 % (ref 11.9–14.6)
GLOBULIN SER CALC-MCNC: 3.4 G/DL (ref 2.3–3.5)
GLUCOSE BLD STRIP.AUTO-MCNC: 103 MG/DL (ref 65–100)
GLUCOSE BLD STRIP.AUTO-MCNC: 79 MG/DL (ref 65–100)
GLUCOSE BLD STRIP.AUTO-MCNC: 95 MG/DL (ref 65–100)
GLUCOSE BLD STRIP.AUTO-MCNC: 99 MG/DL (ref 65–100)
GLUCOSE SERPL-MCNC: 104 MG/DL (ref 65–100)
HCT VFR BLD AUTO: 39 % (ref 41.1–50.3)
HGB BLD-MCNC: 13 G/DL (ref 13.6–17.2)
MAGNESIUM SERPL-MCNC: 2 MG/DL (ref 1.8–2.4)
MCH RBC QN AUTO: 31.9 PG (ref 26.1–32.9)
MCHC RBC AUTO-ENTMCNC: 33.3 G/DL (ref 31.4–35)
MCV RBC AUTO: 95.6 FL (ref 79.6–97.8)
NRBC # BLD: 0 K/UL (ref 0–0.2)
PHOSPHATE SERPL-MCNC: 2.5 MG/DL (ref 2.3–3.7)
PLATELET # BLD AUTO: 210 K/UL (ref 150–450)
PMV BLD AUTO: 8.3 FL (ref 9.4–12.3)
POTASSIUM SERPL-SCNC: 3.6 MMOL/L (ref 3.5–5.1)
PROT SERPL-MCNC: 5.9 G/DL (ref 6.3–8.2)
RBC # BLD AUTO: 4.08 M/UL (ref 4.23–5.6)
SERVICE CMNT-IMP: ABNORMAL
SERVICE CMNT-IMP: NORMAL
SODIUM SERPL-SCNC: 139 MMOL/L (ref 136–145)
TRIGL SERPL-MCNC: 87 MG/DL (ref 35–150)
WBC # BLD AUTO: 6.4 K/UL (ref 4.3–11.1)

## 2022-04-24 PROCEDURE — 74011000258 HC RX REV CODE- 258: Performed by: SURGERY

## 2022-04-24 PROCEDURE — 74011250636 HC RX REV CODE- 250/636: Performed by: SURGERY

## 2022-04-24 PROCEDURE — 74011250636 HC RX REV CODE- 250/636: Performed by: FAMILY MEDICINE

## 2022-04-24 PROCEDURE — 83735 ASSAY OF MAGNESIUM: CPT

## 2022-04-24 PROCEDURE — 36415 COLL VENOUS BLD VENIPUNCTURE: CPT

## 2022-04-24 PROCEDURE — 74011000250 HC RX REV CODE- 250: Performed by: SURGERY

## 2022-04-24 PROCEDURE — 74011250637 HC RX REV CODE- 250/637: Performed by: SURGERY

## 2022-04-24 PROCEDURE — 82962 GLUCOSE BLOOD TEST: CPT

## 2022-04-24 PROCEDURE — 84478 ASSAY OF TRIGLYCERIDES: CPT

## 2022-04-24 PROCEDURE — 80053 COMPREHEN METABOLIC PANEL: CPT

## 2022-04-24 PROCEDURE — 65270000029 HC RM PRIVATE

## 2022-04-24 PROCEDURE — 74011000258 HC RX REV CODE- 258: Performed by: FAMILY MEDICINE

## 2022-04-24 PROCEDURE — 84100 ASSAY OF PHOSPHORUS: CPT

## 2022-04-24 PROCEDURE — 74011000250 HC RX REV CODE- 250: Performed by: FAMILY MEDICINE

## 2022-04-24 PROCEDURE — 97530 THERAPEUTIC ACTIVITIES: CPT

## 2022-04-24 PROCEDURE — 85027 COMPLETE CBC AUTOMATED: CPT

## 2022-04-24 RX ORDER — CARBOXYMETHYLCELLULOSE SODIUM 10 MG/ML
1 GEL OPHTHALMIC AS NEEDED
Status: DISCONTINUED | OUTPATIENT
Start: 2022-04-24 | End: 2022-04-29 | Stop reason: HOSPADM

## 2022-04-24 RX ORDER — KETOROLAC TROMETHAMINE 15 MG/ML
15 INJECTION, SOLUTION INTRAMUSCULAR; INTRAVENOUS EVERY 6 HOURS
Status: DISCONTINUED | OUTPATIENT
Start: 2022-04-24 | End: 2022-04-25

## 2022-04-24 RX ORDER — LEVOTHYROXINE SODIUM 20 UG/ML
100 INJECTION, SOLUTION INTRAVENOUS EVERY 24 HOURS
Status: DISCONTINUED | OUTPATIENT
Start: 2022-04-24 | End: 2022-04-28

## 2022-04-24 RX ADMIN — VASOPRESSIN: 20 INJECTION, SOLUTION INTRAVENOUS at 18:47

## 2022-04-24 RX ADMIN — HYDROMORPHONE HYDROCHLORIDE 0.5 MG: 1 INJECTION, SOLUTION INTRAMUSCULAR; INTRAVENOUS; SUBCUTANEOUS at 23:03

## 2022-04-24 RX ADMIN — SODIUM CHLORIDE, PRESERVATIVE FREE 10 ML: 5 INJECTION INTRAVENOUS at 23:03

## 2022-04-24 RX ADMIN — DEXTROSE MONOHYDRATE, SODIUM CHLORIDE, AND POTASSIUM CHLORIDE 125 ML/HR: 50; 4.5; 1.49 INJECTION, SOLUTION INTRAVENOUS at 13:59

## 2022-04-24 RX ADMIN — SODIUM CHLORIDE, PRESERVATIVE FREE 10 ML: 5 INJECTION INTRAVENOUS at 13:59

## 2022-04-24 RX ADMIN — THIAMINE HYDROCHLORIDE 100 MG: 100 INJECTION, SOLUTION INTRAMUSCULAR; INTRAVENOUS at 08:39

## 2022-04-24 RX ADMIN — HYDROMORPHONE HYDROCHLORIDE 0.5 MG: 1 INJECTION, SOLUTION INTRAMUSCULAR; INTRAVENOUS; SUBCUTANEOUS at 05:00

## 2022-04-24 RX ADMIN — SODIUM CHLORIDE, PRESERVATIVE FREE 10 ML: 5 INJECTION INTRAVENOUS at 05:01

## 2022-04-24 RX ADMIN — ENOXAPARIN SODIUM 40 MG: 40 INJECTION SUBCUTANEOUS at 08:19

## 2022-04-24 RX ADMIN — PIPERACILLIN AND TAZOBACTAM 3.38 G: 3; .375 INJECTION, POWDER, LYOPHILIZED, FOR SOLUTION INTRAVENOUS at 09:48

## 2022-04-24 RX ADMIN — KETOROLAC TROMETHAMINE 15 MG: 15 INJECTION, SOLUTION INTRAMUSCULAR; INTRAVENOUS at 12:33

## 2022-04-24 RX ADMIN — PIPERACILLIN AND TAZOBACTAM 3.38 G: 3; .375 INJECTION, POWDER, LYOPHILIZED, FOR SOLUTION INTRAVENOUS at 18:46

## 2022-04-24 RX ADMIN — HYDROMORPHONE HYDROCHLORIDE 1 MG: 1 INJECTION, SOLUTION INTRAMUSCULAR; INTRAVENOUS; SUBCUTANEOUS at 08:21

## 2022-04-24 RX ADMIN — DEXTROSE MONOHYDRATE, SODIUM CHLORIDE, AND POTASSIUM CHLORIDE 125 ML/HR: 50; 4.5; 1.49 INJECTION, SOLUTION INTRAVENOUS at 00:54

## 2022-04-24 RX ADMIN — SODIUM CHLORIDE, PRESERVATIVE FREE 10 ML: 5 INJECTION INTRAVENOUS at 00:59

## 2022-04-24 RX ADMIN — KETOROLAC TROMETHAMINE 15 MG: 15 INJECTION, SOLUTION INTRAMUSCULAR; INTRAVENOUS at 18:41

## 2022-04-24 RX ADMIN — PIPERACILLIN AND TAZOBACTAM 3.38 G: 3; .375 INJECTION, POWDER, LYOPHILIZED, FOR SOLUTION INTRAVENOUS at 00:54

## 2022-04-24 RX ADMIN — LEVOTHYROXINE SODIUM 100 MCG: 20 INJECTION, SOLUTION INTRAVENOUS at 18:34

## 2022-04-24 RX ADMIN — SOYBEAN OIL 250 ML: 20 INJECTION, SOLUTION INTRAVENOUS at 18:47

## 2022-04-24 RX ADMIN — HYDROMORPHONE HYDROCHLORIDE 0.5 MG: 1 INJECTION, SOLUTION INTRAMUSCULAR; INTRAVENOUS; SUBCUTANEOUS at 16:36

## 2022-04-24 RX ADMIN — Medication 16 G: at 18:39

## 2022-04-24 RX ADMIN — SODIUM CHLORIDE, PRESERVATIVE FREE 20 MG: 5 INJECTION INTRAVENOUS at 08:20

## 2022-04-24 NOTE — PROGRESS NOTES
Problem: Mobility Impaired (Adult and Pediatric)  Goal: *Acute Goals and Plan of Care (Insert Text)  Outcome: Progressing Towards Goal  Note: DISCHARGE GOALS :  (1.)Mr. Suresh Haskins will move from supine to sit and sit to supine  with SUPERVISION (flat bed no rail) . (2.)Mr. Suresh Haskins will transfer from bed to chair and chair to bed with SUPERVISION. (3.)Mr. Suresh Haskins will ambulate with SUPERVISION for 400 feet . 4) pt able to go up & down 4 steps using a rail & SBA. Met 4/24    ________________________________________________________________________________________________      PHYSICAL THERAPY: Daily Note and AM 4/24/2022  INPATIENT: PT Visit Days : 3  [place] / [place] / Product Type: Medicare /       NAME/AGE/GENDER: Gustavo Mccoy is a 80 y.o. male   PRIMARY DIAGNOSIS: [facility]) [facility] [K56.609] SBO (small bowel obstruction) (HCC) SBO (small bowel obstruction) (HCC)  Procedure(s) (LRB):  LAPAROTOMY EXPLORATORY POSSIBLE BOWEL RESECTION (N/A)  3 Days Post-Op  ICD-10: Treatment Diagnosis:   · Generalized Muscle Weakness (M62.81)  · Other lack of cordination (R27.8)  · Difficulty in walking, Not elsewhere classified (R26.2)  · Other abnormalities of gait and mobility (R26.89)   Precaution/Allergies:  Patient has no known allergies. ASSESSMENT:     Mr. Suresh Haskins continues to show steady gains with functional mobility & with tolerance to progressive activity. This pt is in good spirits  & very motivated to participate in progressive activity. This pt will needs to address balance instability that affects his overall safety with transfers & gait. Stability during [address] should improve more quickly once pt is able to receive some form of nutrition. This section established at most recent assessment   PROBLEM LIST (Impairments causing functional limitations):  1. Decreased Strength  2. Decreased ADL/Functional Activities  3.  Decreased Transfer Abilities  4. Decreased Ambulation Ability/Technique  5. Decreased Balance  6. Increased Pain  7. Decreased Activity Tolerance  8. Decreased Flexibility/Joint Mobility  9. Decreased [place] with Home Exercise Program   INTERVENTIONS PLANNED: (Benefits and precautions of physical therapy have been discussed with the patient.)  1. Balance Exercise  2. Bed Mobility  3. Gait Training  4. Therapeutic Activites  5. Therapeutic Exercise/Strengthening  6. Transfer Training     TREATMENT PLAN: Frequency/Duration: daily for duration of hospital stay  Rehabilitation Potential For Stated Goals: Good     REHAB RECOMMENDATIONS (at time of discharge pending progress):    Placement: It is my opinion, based on this patient's performance to date, that Mr. Fam Shepherd may benefit from [address] after discharge due to the functional deficits listed above that are likely to improve with skilled rehabilitation because he/she has multiple medical issues that affect his/her functional mobility in the community. Equipment:    None at this time              HISTORY:   History of Present Injury/Illness (Reason for Referral):  Ema Rai is a 80 y.o. male with medical history of GERD s/p nissen fundoplication 1610, thyroid CA s/p resection, HTN, CAD who presented with epigastrict abdominal pain and RUQ pain that started around 4pm yesterday. Has not passed gas or had a BM since onset of symptoms. He had an outpatient CT today showing high grade SBO @ [facility] but wanted to be admitted at [facility] so he drove here. Labs significant for WBC 19K with neutrophile predominance. rec'd zosyn in ED. General surgery was notified and requested hospitalist admission. Patient in severe discomfort on my eval. Family at bedside. No PO intake > 24 hours.       Past Medical History/Comorbidities:   Mr. Fam Shepherd  has a past medical history of Arthritis, Chronic coronary artery disease, Gastroesophageal reflux disease, Gout, Hypercholesterolemia, Hypertension, Malignant melanoma, Papillary thyroid carcinoma, multifocal (3 tumors in the left lobe, largest tumor 0.9 cm), status post thyroidectomy/central lymph node dissection 5/26/2015 and 54.5 mCi iodine-131 7/22/2015, and Postsurgical hypothyroidism. Mr. Giuliana Monreal  has a past surgical history that includes hx coronary stent placement (2010); hx malignant skin lesion excision (2011); hx cholecystectomy (2005); hx knee arthroscopy (Right, 2005); hx tonsillectomy (Age 4); hx appendectomy (1969); hx thyroidectomy (5/26/2015); hx gi (2008); and hx coronary artery bypass graft (08/19/2016). Social History/Living Environment:   Home Environment: Apartment  # Steps to Enter: 4  Rails to Enter: Yes  [employer] : Left  One/Two Story Residence: One story  Living Alone: No  Support Systems: Spouse/Significant Other Andrew De La Cruz [phone])  Patient Expects to be Discharged to[de-identified] Home with family assistance  Current DME Used/Available at Home: None  Prior Level of Function/Work/Activity:  Pt was independent without an assistive device  Personal Factors: Other factors that influence how disability is experienced by the patient:  current  PMH   Number of Personal Factors/Comorbidities that affect the Plan of Care: 3+: HIGH COMPLEXITY   EXAMINATION:   Most Recent Physical Functioning:   Gross Assessment:  AROM: Generally decreased, functional  Strength: Generally decreased, functional  Coordination: Generally decreased, functional                  Balance:  Sitting: Intact; Without support  Standing: Impaired; With support (walker) Bed Mobility:  Supine to Sit: Stand-by assistance  Sit to Supine:  (NT)  Scooting: Stand-by assistance       Transfers:  Sit to Stand: Stand-by assistance  Stand to Sit: Stand-by assistance  Bed to Chair: Stand-by assistance  Duration: 38 Minutes (extra time to work through activity noted)  Gait:     Speed/Christine: Delayed  Step Length:  (equal)  Gait Abnormalities: Decreased step clearance (mild unsteadiness)  Distance (ft): 650 Feet (ft)  Assistive Device:  (none)  Ambulation - Level of Assistance: Stand-by assistance  Number of Stairs Trained: 6  Stairs - Level of Assistance: Stand-by assistance  Rail Use: Right        Home Environment: Apartment  Home Situation  Home Environment: Apartment  # Steps to Enter: 4  Rails to Enter: Yes  Hand Rails : Left  One/Two Story Residence: One story  Living Alone: No  Support Systems: Spouse/Significant Other  Patient Expects to be Discharged to[de-identified] Home  Current DME Used/Available at Home: None   Functional Mobility:         Gait/Ambulation: sba        Transfers: sba        Bed Mobility:  NT   Body Structures Involved:  1. Digestive Structures  2. Muscles Body Functions Affected:  1. Movement Related  2. Digestive Activities and Participation Affected:  1. General Tasks and Demands  2. Mobility   Number of elements that affect the Plan of Care: 4+: HIGH COMPLEXITY   CLINICAL PRESENTATION:   Presentation: Stable and uncomplicated: LOW COMPLEXITY   CLINICAL DECISION MAKIN[address] [place] [address] AM-PAC 6 Clicks   Basic Mobility Inpatient Short Form  How much difficulty does the patient currently have. .. Unable A Lot A Little None   1. Turning over in bed (including adjusting bedclothes, sheets and blankets)? [] 1   [] 2   [x] 3   [] 4   2. Sitting down on and standing up from a chair with arms ( e.g., wheelchair, bedside commode, etc.)   [] 1   [] 2   [x] 3   [] 4   3. Moving from lying on back to sitting on the side of the bed? [] 1   [] 2   [x] 3   [] 4   How much help from another person does the patient currently need. .. Total A Lot A Little None   4. Moving to and from a bed to a chair (including a wheelchair)? [] 1   [] 2   [x] 3   [] 4   5. Need to walk in hospital room? [] 1   [] 2   [x] 3   [] 4   6. Climbing 3-5 steps with a railing?    [x] 1   [] 2   [] 3   [] 4   © , Trustees of [address] [place] [address], under license to [employer]. All rights reserved      Score:  Initial: 16 Most Recent: X (Date: -- )    Interpretation of Tool:  Represents activities that are increasingly more difficult (i.e. Bed mobility, Transfers, Gait). Medical Necessity:     · Patient is expected to demonstrate progress in   · strength, range of motion, balance, coordination, and functional technique  ·  to   · decrease assistance required with bed mobility, transfers & gait  · .  Reason for Services/Other Comments:  · Patient continues to require skilled intervention due to   · Pt not independent with functional mobility  · . Use of outcome tool(s) and clinical judgement create a POC that gives a: Clear prediction of patient's progress: LOW COMPLEXITY            TREATMENT:   (In addition to Assessment/Re-Assessment sessions the following treatments were rendered)   Pre-treatment Symptoms/Complaints: pt agreeable  Pain: Initial: numeric scale  Pain Intensity 1: 3  Pain Location 1: Abdomen  Pain Orientation 1: Mid  Pain Intervention(s) 1: Ambulation/Increased Activity,Exercise  Post Session:  3/10       Therapeutic Activity: (  38 Minutes (extra time to work through activity noted) ):  Therapeutic activities including bed mobility (log rolling),repeated transfers, standing balance activity without UE support (repeated reaching overhead & in front, repeated chop lift to R & L, repeated box stepping), progressive gait training & stair training x 2, pt reviewed with PT UE T-band exercises ( overhead press, bench press, rows, horizontal shoulder ABD) to improve mobility, strength, balance, coordination and dynamic movement of arm - bilateral, leg - bilateral and core to improve functional endurance & stability.      Braces/Orthotics/Lines/Etc:   · IV  Treatment/Session Assessment:    · Response to Treatment: still progressing with steady gains  · Interdisciplinary Collaboration:   o Registered Nurse  · After treatment position/precautions: o Up in chair  o Bed/Chair-wheels locked  o Caregiver at bedside  o Call light within reach  o RN notified  o Family at bedside   · Compliance with Program/Exercises: Compliant all of the time  · Recommendations/Intent for next treatment session: \"Next visit will focus on reduction in assistance provided\".   Total Treatment Duration:  PT Patient Time In/Time Out  Time In: 1109  Time Out: [address], PT

## 2022-04-24 NOTE — CONSULTS
Comprehensive Nutrition Assessment    Type and Reason for Visit: Reassess,Consult  TPN Management (Hospitalist)    Nutrition Recommendations/Plan:   Parenteral Nutrition:  Peripheral parenteral nutrition to begin at 1800  Initiate: Dex 5%, 4.25% AA 2.4 L (100ml/hr)   Initiate 250 ml 20% lipids daily  To provide: 1316 kcal/d (80% of needs), 102 grams of protein/d (100% of needs), 408 grams of CHO/d and 2650 ml of total volume/d. Lytes/L:   Sodium 80 meq (80 meq NaCl), Potassium 25 meq (25 meq KPO4), 5 meq Mg, omit Calcium. Osmolarity 885  Other additives: MTE, MVI MWF due to national shortages, Start 100 mg Thiamine x 4 days 4/25 as provider ordered 3 day dose with last dose today  IVF:  Discontinue at 1800  Nutritional Supplement Therapy:   Active electrolyte replacement per nutrition support protocols  Replacement indicated:  None  Labs:   BMP daily  Mg MWF  Phos MWF    Triglyceride tomorrow  POC Glucoses/SSI Active     Malnutrition Assessment:  Malnutrition Status: At risk for malnutrition (specify) (NPO X 6 days)    Nutrition Assessment:   Nutrition History:    Cultural/B[employer]ist/Ethnic Food Preference(s): None Called and spoke with wife as nutrition assessment was completed remotely from [facility]. She reports no changes to PO intake until acutely just prior to admission. She states that patient typically eats lunch and dinner. Noted from previous nutrition note, patient with intentional weight loss PTA. Wife confirms. Nutrition Background:   Patient with PMH significant for GERD with Nissen fundoplication, thyroid cancer s/p XRT, HTN, CAD. He presented with epigastric pain. He had outpatient CT with [facility] showing high grade SBO, but wanted to be admitted to [facility] so he drove here. He is now s/p lap with reduction of internal hernia 4/21. Small bowel was trapped in hernia and was ischemic but not resected. Nutrition Interval:  Called and spoke with Dr. Cassidy Andujar regarding PPN vs TPN.  She states likely will initiate diet ~4/27. Agree with PPN due to short duration. Discussed single PIV and Zosyn TID (4 hr infusion). She states they will get a second PIV for Zosyn. Discussed with RN, Miguel A Klein, and Amisha Miranda. Called and spoke with wife above. No flatus yet, but feels active. Slight nausea. Answered questions regarding PPN. JOHNSON: 600 ml over last 24 hrs  Abdominal Status (last documented): Soft,Tender (midline incision) abdomen with Hypoactive  bowel sounds. Last BM  .   Pertinent Medications: SSI (not utilized), Pepcid 20 mg/d, Thiamine 100 mg x3 doses (last dose today), Zosyn TID  IVF: D5 1/2NS with 20 meq KCl @ 125 ml/hr  Pertinent Labs:   Lab Results   Component Value Date/Time    Sodium 139 04/24/2022 04:57 AM    Potassium 3.6 04/24/2022 04:57 AM    Chloride 106 04/24/2022 04:57 AM    CO2 30 04/24/2022 04:57 AM    Anion gap 3 (L) 04/24/2022 04:57 AM    Glucose 104 (H) 04/24/2022 04:57 AM    BUN 9 04/24/2022 04:57 AM    Creatinine 1.00 04/24/2022 04:57 AM    Calcium 9.2 04/24/2022 04:57 AM    Albumin 2.5 (L) 04/24/2022 04:57 AM    Magnesium 2.0 04/24/2022 04:57 AM    Phosphorus 2.5 04/24/2022 04:57 AM     Lab Results   Component Value Date/Time    Triglyceride 87 04/24/2022 04:57 AM     Lab Results   Component Value Date/Time    Glucose 104 (H) 04/24/2022 04:57 AM    Glucose (POC) 95 04/24/2022 05:41 AM    Glucose (POC) 99 04/24/2022 12:25 AM    Glucose (POC) 94 04/23/2022 06:07 PM    Glucose (POC) 108 (H) 04/23/2022 11:39 AM    Glucose (POC) 125 (H) 04/23/2022 06:31 AM    Glucose (POC) 115 (H) 04/23/2022 12:22 AM   Labs remarkable for K trending down with start of dextrose containing fluids despite KCl in IVF, Na trending down with IVF, Phos and Mg WNL, glucose slightly elevated but no SSI utilized, TG acceptable to initiate lipids, CCa 10.4      Nutrition Related Findings:   NFPE deferred due to remote assessment      Current Nutrition Therapies:  DIET NPO    Current Intake:   Average Meal Intake: NPO Anthropometric Measures:  Height: 5' 8\" (172.7 cm)  Current Body Wt: 80.7 kg (177 lb 14.6 oz) (4/20), Weight source: Stated  BMI: 27.1, Overweight (BMI 25.0-29. 9)  Admission Body Weight: 177 lb 14.6 oz  Ideal Body Weight (lbs) (Calculated): 154 lbs (70 kg), 115.5 %  Usual Body Wt: 80.7 kg (178 lb) (last office weight 2/2022), Percent weight change: 0       Edema: No data recorded  Estimated Daily Nutrient Needs:  Energy (kcal/day): 7171-5305 (Kcal/kg (20-25) Weight used: Current (80.7 kg (4/20))    Protein (g/day):  (1.2-1.3 g/kg s/p surgery) Weight Used: (Current) 80.7 kg  Fluid (ml/day):   (1 ml/kcal)    Nutrition Diagnosis:   · Inadequate oral intake related to altered GI function as evidenced by  (s/p lap reduction on hernia with ischemic bowel)    Nutrition Interventions:   Food and/or Nutrient Delivery: Continue NPO,Start parenteral nutrition     Coordination of Nutrition Care: Continue to monitor while inpatient       Goals:       Active Goal: Tolerate nutrition support at goal rate       Nutrition Monitoring and Evaluation:      Food/Nutrient Intake Outcomes: Parenteral nutrition intake/tolerance  Physical Signs/Symptoms Outcomes: Biochemical data,GI status,Fluid status or edema,Weight    Discharge Planning:         Wilfredo Jaime RD, JENIFFER, LD on 4/24/2022 at 8:50 AM  Contact: [phone]

## 2022-04-24 NOTE — ROUTINE PROCESS
Bedside and Verbal report given to [facility], RN by self. Report included SBAR, Kardex, ED summary, procedure summary, recent results.

## 2022-04-24 NOTE — ROUTINE PROCESS
Bedside and Verbal report given to self by Savannah Pabon RN. Report included SBAR, Kardex, ED Summary, Procedure Summary, Intake and Output.

## 2022-04-24 NOTE — PROGRESS NOTES
Progress Note    Patient: Yuko Reynoso. MRN: [id number]  SSN: [id number]    YOB: 1950  Age: 80 y.o. Sex: male      Admit Date: 2022    3 Days Post-Op    Procedure:  Procedure(s):  LAPAROTOMY EXPLORATORY POSSIBLE BOWEL RESECTION    Subjective:     Patient reports that he has been having elevated blood pressures but he thinks part of this is because his pain is not well controlled.   No Flatus or bowel movements      Objective:     Visit Vitals  BP (!) 169/68 (BP 1 Location: Right upper arm, BP Patient Position: Sitting)   Pulse (!) 59   Temp 98.6 °F (37 °C)   Resp 18   Ht 5' 8\" (1.727 m)   Wt 80.7 kg (178 lb)   SpO2 98%   BMI 27.06 kg/m²       Temp (24hrs), Av.3 °F (36.8 °C), Min:97.7 °F (36.5 °C), Max:98.6 °F (37 °C)      Physical Exam:    WDWN in NAD  Abdomen: soft, non-tender  incisions are clean and dry      Assessment:     Hospital Problems  Date Reviewed: 2021          Codes Class Noted POA    Leukocytosis ICD-10-CM: D72.829  ICD-9-CM: 288.60  2022 Yes        Generalized abdominal pain ICD-10-CM: R10.84  ICD-9-CM: 789.07  2022 Yes        Transaminitis ICD-10-CM: R74.01  ICD-9-CM: 790.4  2022 No        Acute kidney injury (GINNY) with acute tubular necrosis (ATN) (HCC) ICD-10-CM: N17.0  ICD-9-CM: 584.5  2022 Yes        * (Principal) SBO (small bowel obstruction) ([address] ICD-10-CM: S15.383  ICD-9-CM: 560.9  2022 Yes    Overview Addendum 2022  1:49 PM by Juni Galvin MD     22 s/p expl lap and reduction of internal hernia for high grade SBO; Dr Bryant Peacock             Postsurgical hypothyroidism ICD-10-CM: E89.0  ICD-9-CM: 244.0  Unknown Yes        Papillary thyroid carcinoma, multifocal (3 tumors in the left lobe, largest tumor 0.9 cm), status post thyroidectomy/central lymph node dissection 2015 and 54.5 mCi iodine-131 2015 ICD-10-CM: C73  ICD-9-CM: 344  2016 Yes        GERD (gastroesophageal reflux disease) ICD-10-CM: K21.9  ICD-9-CM: 530.81  Unknown Yes        Chronic coronary artery disease ICD-10-CM: I25.10  ICD-9-CM: 414.00  9/3/2015 Yes    Overview Signed 6/21/2016  8:29 AM by Katheryn JOHNS     Overview:   BMS RCA 2008; NORMAL STRESS TEST 2014    Last Assessment & Plan:   He does not have any anginal quality chest pain. He occasionally has sharp atypical pain. His stress test was normal less than a year ago. I don't plan any further investigation.                    Plan/Recommendations/Medical Decision Making:     I have added Toradol  Await bowel function return  Continue NG tube suction and n.p.o. status

## 2022-04-24 NOTE — PROGRESS NOTES
Hospitalist Progress Note   Admit Date:  2022  4:44 PM   Name:  Rigo Maria. Age:  80 y.o. Sex:  male  :  1950   MRN:  [id number]   Room:  341/01    Presenting Complaint: Abdominal Pain and Abnormal Lab Results    Reason(s) for Admission: SBO (small bowel obstruction) [facility] Course & Interval History:   Rigo Padgett is a 80 y.o. male with medical history of GERD s/p nissen fundoplication , thyroid CA s/p resection, HTN, CAD who presented with epigastrict abdominal pain and RUQ pain that started around 4pm yesterday. Has not passed gas or had a BM since onset of symptoms. He had an outpatient CT today showing high grade SBO @ [facility] but wanted to be admitted at [facility] so he drove here. Labs significant for WBC 19K with neutrophile predominance. rec'd zosyn in ED. General surgery was notified and requested hospitalist admission. Patient in severe discomfort on my eval. Family at bedside. No PO intake > 24 hours    He is s/p Nissen fundoplication performed in  by Dr. Jacob Sexton at an outside hospital and reports he has been unable to vomit since then. He is s/p laparoscopic cholecystectomy  by Dr. Jacob Sexton  He is s/p open appendectomy . He is s/p colonoscopy in 2019 and reports this was normal with his next planned in 5 years.     OSH CT a/p - Findings consistent with high-grade small bowel obstruction with transition point within the right upper quadrant. Admitted NPO, NGT, IVF, Zosyn with no improvement overnight and urgently transferred to OR. Now s/p expl laparotomy and reduction of internal hernia for high grade SBO on 22. Small bowel entrapped in the hernia was ischemic and injured but not resected and looked injury looked reversible but will take some time per , surgery. Subjective/24hr Events (22):  POD3 fatigued. Work up a lot to urinate. C/o dry itchy eyes and r ear pain  .    ROS:  10 systems reviewed and negative except as noted above. Assessment & Plan:     High grade SBO with SIRS - refractory to bowel compression and rest. Now s/p expl laparotomy and reduction of internal hernia for high grade SBO on 4/21/22. Small bowel entrapped in the hernia was ischemic and injured but not resected and looked injury looked reversible but will take some time per , surgery. NGT to LIS. Cont. IVF. Zosyn. PPN/TPN to start. toradol q6h added to day per surgery     Acute liver injury - responded to medical therapy  Suspect 2/2 early sepsis from ischemic bowel. RUQ US showed normal size liver and heterogenous appearance and some contour nodularity suggested. No h/o alcohol abuse. H/o hepatoxic agent accicdentally taken double the dose years ago and was closely monitored for hepatic failure thereafter. Acute hepatitis panel negative. LFTs normalized s/p surgery, abx and IVF. Trend CMP. Lab Results   Component Value Date/Time    ALT (SGPT) 54 04/24/2022 04:57 AM    AST (SGOT) 15 04/24/2022 04:57 AM    Alk. phosphatase 64 04/24/2022 04:57 AM    Bilirubin, total 0.8 04/24/2022 04:57 AM       GINNY 2/2 ATN - improved with IVF. Now back to baseline Scr ~1.0. cont. IVF, supportive care. Lab Results   Component Value Date/Time    Creatinine 1.00 04/24/2022 04:57 AM     Hyperglycemia - 2/2 severe illness, fasting  on admission now improved. a1c 5.6%. cont SSI q6h. Postsurgical hypothyroidism - start IV synthroid on 4/24    HTN - hold home meds.      CAD - BB held due to bradycardia. Other meds held due to NPO state. Chronic coronary artery disease       Papillary thyroid carcinoma, multifocal (3 tumors in the left lobe, largest tumor 0.9 cm), status post thyroidectomy/central lymph node dissection 5/26/2015 and 54.5 mCi iodine-131     - follows with endocrinology outpatient.        GERD s/p buzz fundoplication - IV pepcid            Discharge Planning:      Not stable     Diet:  DIET NPO  TPN ADULT - PERIPHERAL - dextrose 5% amino acid 4.25%  DVT PPx: lovenox  Code status: Full Code    Hospital Problems as of 4/24/2022 Date Reviewed: 12/14/2021          Codes Class Noted - Resolved POA    Leukocytosis ICD-10-CM: D72.829  ICD-9-CM: 288.60  4/21/2022 - Present Yes        Generalized abdominal pain ICD-10-CM: R10.84  ICD-9-CM: 789.07  4/21/2022 - Present Yes        Transaminitis ICD-10-CM: R74.01  ICD-9-CM: 790.4  4/21/2022 - Present No        Acute kidney injury (GINNY) with acute tubular necrosis (ATN) ([facility] ICD-10-CM: N17.0  ICD-9-CM: 584.5  4/21/2022 - Present Yes        * (Principal) SBO (small bowel obstruction) ([facility] ICD-10-CM: K63.213  ICD-9-CM: 560.9  4/20/2022 - Present Yes    Overview Addendum 4/21/2022  1:49 PM by Jermain Blake MD     4/21/22 s/p expl lap and reduction of internal hernia for high grade SBO; Dr Roni Tee             Postsurgical hypothyroidism ICD-10-CM: E89.0  ICD-9-CM: 244.0  Unknown - Present Yes        Papillary thyroid carcinoma, multifocal (3 tumors in the left lobe, largest tumor 0.9 cm), status post thyroidectomy/central lymph node dissection 5/26/2015 and 54.5 mCi iodine-131 7/22/2015 ICD-10-CM: C73  ICD-9-CM: 470  6/21/2016 - Present Yes        GERD (gastroesophageal reflux disease) ICD-10-CM: K21.9  ICD-9-CM: 530.81  Unknown - Present Yes        Chronic coronary artery disease ICD-10-CM: I25.10  ICD-9-CM: 414.00  9/3/2015 - Present Yes    Overview Signed 6/21/2016  8:29 AM by Adi JOHNS     Overview:   BMS RCA 2008; NORMAL STRESS TEST 2014    Last Assessment & Plan:   He does not have any anginal quality chest pain. He occasionally has sharp atypical pain. His stress test was normal less than a year ago. I don't plan any further investigation.                    Objective:     Patient Vitals for the past 24 hrs:   Temp Pulse Resp BP SpO2   04/24/22 1609  62  (!) 176/71 97 %   04/24/22 1608 98.4 °F (36.9 °C) 62  (!) 174/78 97 %   04/24/22 1241 98.3 °F (36.8 °C) 62 16 (!) 141/80 99 %   04/24/22 0815 98.6 °F (37 °C) (!) 59 18 (!) 169/68 98 %   04/24/22 0507    (!) 165/72    04/24/22 0342 97.7 °F (36.5 °C) 60 18 (!) 179/80 97 %   04/23/22 2317 98.2 °F (36.8 °C) (!) 58 16 (!) 169/66 96 %   04/23/22 1903 98.5 °F (36.9 °C) 60 18 (!) 168/70 98 %     Oxygen Therapy  O2 Sat (%): 97 % (04/24/22 1609)  O2 Device: None (Room air) (04/24/22 1608)  O2 Flow Rate (L/min): 10 l/min (04/21/22 1416)    Estimated body mass index is 27.06 kg/m² as calculated from the following:    Height as of this encounter: 5' 8\" (1.727 m). Weight as of this encounter: 80.7 kg (178 lb). Intake/Output Summary (Last 24 hours) at 4/24/2022 1627  Last data filed at 4/24/2022 1200  Gross per 24 hour   Intake    Output 700 ml   Net -700 ml         Physical Exam:     Blood pressure (!) 176/71, pulse 62, temperature 98.4 °F (36.9 °C), resp. rate 16, height 5' 8\" (1.727 m), weight 80.7 kg (178 lb), SpO2 97 %. General:    Ill appearing. Resting  In bed   Head:  Normocephalic, atraumatic  Eyes:  Sclerae appear normal.  Pupils equally round. ENT:  NGT in place. Neck:  No restricted ROM. Trachea midline   CV:   RRR. No m/r/g. No jugular venous distension. Lungs:   CTAB. No wheezing, rhonchi, or rales. Respirations even, unlabored  Abdomen: Bowel sounds hypoactive. Soft. Distended. tender  Extremities: No cyanosis or clubbing. No edema  Skin:     No rashes and normal coloration. Warm and dry. Neuro:  CN II-XII grossly intact. Sensation intact. A&Ox3  Psych:  Normal mood and affect.       I have reviewed ordered lab tests and independently visualized imaging below:    Recent Labs:  Recent Results (from the past 48 hour(s))   GLUCOSE, POC    Collection Time: 04/23/22 12:22 AM   Result Value Ref Range    Glucose (POC) 115 (H) 65 - 100 mg/dL    Performed by Cathie Rees    CBC W/O DIFF    Collection Time: 04/23/22  6:08 AM   Result Value Ref Range    WBC 6.4 4.3 - 11.1 K/uL    RBC 3.82 (L) 4.23 - 5.6 M/uL    HGB 12.4 (L) 13.6 - 17.2 g/dL    HCT 37.4 (L) 41.1 - 50.3 %    MCV 97.9 (H) 79.6 - 97.8 FL    MCH 32.5 26.1 - 32.9 PG    MCHC 33.2 31.4 - 35.0 g/dL    RDW 13.9 11.9 - 14.6 %    PLATELET 918 510 - 299 K/uL    MPV 8.7 (L) 9.4 - 12.3 FL    ABSOLUTE NRBC 0.00 0.0 - 0.2 K/uL   METABOLIC PANEL, COMPREHENSIVE    Collection Time: 04/23/22  6:08 AM   Result Value Ref Range    Sodium 141 136 - 145 mmol/L    Potassium 4.0 3.5 - 5.1 mmol/L    Chloride 108 (H) 98 - 107 mmol/L    CO2 30 21 - 32 mmol/L    Anion gap 3 (L) 7 - 16 mmol/L    Glucose 126 (H) 65 - 100 mg/dL    BUN 12 8 - 23 MG/DL    Creatinine 1.03 0.8 - 1.5 MG/DL    GFR est AA >60 >60 ml/min/1.73m2    GFR est non-AA >60 >60 ml/min/1.73m2    Calcium 8.8 8.3 - 10.4 MG/DL    Bilirubin, total 0.6 0.2 - 1.1 MG/DL    ALT (SGPT) 75 (H) 12 - 65 U/L    AST (SGOT) 17 15 - 37 U/L    Alk.  phosphatase 60 50 - 136 U/L    Protein, total 5.6 (L) 6.3 - 8.2 g/dL    Albumin 2.4 (L) 3.2 - 4.6 g/dL    Globulin 3.2 2.3 - 3.5 g/dL    A-G Ratio 0.8 (L) 1.2 - 3.5     AMMONIA    Collection Time: 04/23/22  6:08 AM   Result Value Ref Range    Ammonia, plasma 31 24.9 - 68 UMOL/L   PROTHROMBIN TIME + INR    Collection Time: 04/23/22  6:08 AM   Result Value Ref Range    Prothrombin time 13.5 12.6 - 14.5 sec    INR 1.0     HEPATITIS PANEL, ACUTE    Collection Time: 04/23/22  6:08 AM   Result Value Ref Range    Hepatitis A, IgM NONREACTIVE NR      Hepatitis B core, IgM NONREACTIVE NR      Hep B Surface Ag NONREACTIVE NR      Hepatitis C virus Ab NONREACTIVE NR     GLUCOSE, POC    Collection Time: 04/23/22  6:31 AM   Result Value Ref Range    Glucose (POC) 125 (H) 65 - 100 mg/dL    Performed by  [address], POC    Collection Time: 04/23/22 11:39 AM   Result Value Ref Range    Glucose (POC) 108 (H) 65 - 100 mg/dL    Performed by Sherwin    GLUCOSE, POC    Collection Time: 04/23/22  6:07 PM   Result Value Ref Range    Glucose (POC) 94 65 - 100 mg/dL    Performed by Sherwin    GLUCOSE, POC    Collection Time: 04/24/22 12:25 AM   Result Value Ref Range    Glucose (POC) 99 65 - 100 mg/dL    Performed by Jose Alejandro    CBC W/O DIFF    Collection Time: 04/24/22  4:57 AM   Result Value Ref Range    WBC 6.4 4.3 - 11.1 K/uL    RBC 4.08 (L) 4.23 - 5.6 M/uL    HGB 13.0 (L) 13.6 - 17.2 g/dL    HCT 39.0 (L) 41.1 - 50.3 %    MCV 95.6 79.6 - 97.8 FL    MCH 31.9 26.1 - 32.9 PG    MCHC 33.3 31.4 - 35.0 g/dL    RDW 13.2 11.9 - 14.6 %    PLATELET 401 448 - 038 K/uL    MPV 8.3 (L) 9.4 - 12.3 FL    ABSOLUTE NRBC 0.00 0.0 - 0.2 K/uL   METABOLIC PANEL, COMPREHENSIVE    Collection Time: 04/24/22  4:57 AM   Result Value Ref Range    Sodium 139 136 - 145 mmol/L    Potassium 3.6 3.5 - 5.1 mmol/L    Chloride 106 98 - 107 mmol/L    CO2 30 21 - 32 mmol/L    Anion gap 3 (L) 7 - 16 mmol/L    Glucose 104 (H) 65 - 100 mg/dL    BUN 9 8 - 23 MG/DL    Creatinine 1.00 0.8 - 1.5 MG/DL    GFR est AA >60 >60 ml/min/1.73m2    GFR est non-AA >60 >60 ml/min/1.73m2    Calcium 9.2 8.3 - 10.4 MG/DL    Bilirubin, total 0.8 0.2 - 1.1 MG/DL    ALT (SGPT) 54 12 - 65 U/L    AST (SGOT) 15 15 - 37 U/L    Alk.  phosphatase 64 50 - 136 U/L    Protein, total 5.9 (L) 6.3 - 8.2 g/dL    Albumin 2.5 (L) 3.2 - 4.6 g/dL    Globulin 3.4 2.3 - 3.5 g/dL    A-G Ratio 0.7 (L) 1.2 - 3.5     MAGNESIUM    Collection Time: 04/24/22  4:57 AM   Result Value Ref Range    Magnesium 2.0 1.8 - 2.4 mg/dL   PHOSPHORUS    Collection Time: 04/24/22  4:57 AM   Result Value Ref Range    Phosphorus 2.5 2.3 - 3.7 MG/DL   TRIGLYCERIDE    Collection Time: 04/24/22  4:57 AM   Result Value Ref Range    Triglyceride 87 35 - 150 MG/DL   GLUCOSE, POC    Collection Time: 04/24/22  5:41 AM   Result Value Ref Range    Glucose (POC) 95 65 - 100 mg/dL    Performed by Jose Alejandro    GLUCOSE, POC    Collection Time: 04/24/22 12:02 PM   Result Value Ref Range    Glucose (POC) 103 (H) 65 - 100 mg/dL    Performed by DavisSandraCharlesPCT        All Micro Results     Procedure Component Value Units Date/Time    CULTURE, BLOOD [[id number]] Collected: 04/20/22 1907    Order Status: Completed Specimen: Blood Updated: 04/24/22 0342     Special Requests: --        RIGHT  FOREARM       Culture result: NO GROWTH 4 DAYS       CULTURE, BLOOD [[id number]] Collected: 04/20/22 1759    Order Status: Completed Specimen: Blood Updated: 04/24/22 0342     Special Requests: --        NO SPECIAL REQUESTS  LEFT  Antecubital       Culture result: NO GROWTH 4 DAYS             Other Studies:  No results found.     Current Meds:  Current Facility-Administered Medications   Medication Dose Route Frequency    TPN ADULT - PERIPHERAL - dextrose 5% amino acid 4.25%   IntraVENous QPM    fat emulsion 20% (LIPOSYN, INTRAlipid) infusion 250 mL  250 mL IntraVENous QPM    ketorolac (TORADOL) injection 15 mg  15 mg IntraVENous Q6H    NUTRITIONAL SUPPORT ELECTROLYTE PRN ORDERS   Does Not Apply PRN    HYDROmorphone (DILAUDID) injection 0.5 mg  0.5 mg IntraVENous Q3H PRN    Or    HYDROmorphone (DILAUDID) injection 1 mg  1 mg IntraVENous Q3H PRN    phenol throat spray (CHLORASEPTIC) 1 Spray  1 Spray Oral PRN    dextrose 5% - 0.45% NaCl with KCl 20 mEq/L infusion  125 mL/hr IntraVENous CONTINUOUS    lidocaine (XYLOCAINE) 10 mg/mL (1 %) injection 0.1 mL  0.1 mL SubCUTAneous PRN    sodium chloride (NS) flush 5-40 mL  5-40 mL IntraVENous Q8H    sodium chloride (NS) flush 5-40 mL  5-40 mL IntraVENous PRN    acetaminophen (TYLENOL) tablet 650 mg  650 mg Oral Q6H PRN    Or    acetaminophen (TYLENOL) suppository 650 mg  650 mg Rectal Q6H PRN    ondansetron (ZOFRAN) injection 4 mg  4 mg IntraVENous Q6H PRN    enoxaparin (LOVENOX) injection 40 mg  40 mg SubCUTAneous DAILY    piperacillin-tazobactam (ZOSYN) 3.375 g in 0.9% sodium chloride (MBP/ADV) 100 mL MBP  3.375 g IntraVENous Q8H    glucose chewable tablet 16 g  16 g Oral PRN    glucagon (GLUCAGEN) injection 1 mg  1 mg IntraMUSCular PRN    dextrose 10% infusion 125-250 mL  125-250 mL IntraVENous PRN    insulin regular (NOVOLIN R, HUMULIN R) injection   SubCUTAneous Q6H    famotidine (PF) (PEPCID) 20 mg in 0.9% sodium chloride 10 mL injection  20 mg IntraVENous DAILY    naloxone (NARCAN) injection 0.2 mg  0.2 mg IntraVENous EVERY 2 MINUTES AS NEEDED       Signed:  Carlyle Reynoso DO    Part of this note may have been written by using a voice dictation software. The note has been proof read but may still contain some grammatical/other typographical errors.

## 2022-04-25 LAB
ALBUMIN SERPL-MCNC: 2.7 G/DL (ref 3.2–4.6)
ALBUMIN/GLOB SERPL: 0.8 {RATIO} (ref 1.2–3.5)
ALP SERPL-CCNC: 71 U/L (ref 50–136)
ALT SERPL-CCNC: 50 U/L (ref 12–65)
ANION GAP SERPL CALC-SCNC: 6 MMOL/L (ref 7–16)
AST SERPL-CCNC: 20 U/L (ref 15–37)
BACTERIA SPEC CULT: NORMAL
BACTERIA SPEC CULT: NORMAL
BILIRUB SERPL-MCNC: 0.5 MG/DL (ref 0.2–1.1)
BUN SERPL-MCNC: 15 MG/DL (ref 8–23)
CALCIUM SERPL-MCNC: 8.8 MG/DL (ref 8.3–10.4)
CHLORIDE SERPL-SCNC: 108 MMOL/L (ref 98–107)
CO2 SERPL-SCNC: 26 MMOL/L (ref 21–32)
CREAT SERPL-MCNC: 1.06 MG/DL (ref 0.8–1.5)
ERYTHROCYTE [DISTWIDTH] IN BLOOD BY AUTOMATED COUNT: 13.2 % (ref 11.9–14.6)
GLOBULIN SER CALC-MCNC: 3.5 G/DL (ref 2.3–3.5)
GLUCOSE BLD STRIP.AUTO-MCNC: 110 MG/DL (ref 65–100)
GLUCOSE BLD STRIP.AUTO-MCNC: 141 MG/DL (ref 65–100)
GLUCOSE SERPL-MCNC: 103 MG/DL (ref 65–100)
HCT VFR BLD AUTO: 38.8 % (ref 41.1–50.3)
HGB BLD-MCNC: 13.6 G/DL (ref 13.6–17.2)
MAGNESIUM SERPL-MCNC: 2.2 MG/DL (ref 1.8–2.4)
MCH RBC QN AUTO: 32.5 PG (ref 26.1–32.9)
MCHC RBC AUTO-ENTMCNC: 35.1 G/DL (ref 31.4–35)
MCV RBC AUTO: 92.8 FL (ref 79.6–97.8)
NRBC # BLD: 0 K/UL (ref 0–0.2)
PHOSPHATE SERPL-MCNC: 4 MG/DL (ref 2.3–3.7)
PLATELET # BLD AUTO: 250 K/UL (ref 150–450)
PMV BLD AUTO: 8.6 FL (ref 9.4–12.3)
POTASSIUM SERPL-SCNC: 3.9 MMOL/L (ref 3.5–5.1)
PROT SERPL-MCNC: 6.2 G/DL (ref 6.3–8.2)
RBC # BLD AUTO: 4.18 M/UL (ref 4.23–5.6)
SERVICE CMNT-IMP: ABNORMAL
SERVICE CMNT-IMP: ABNORMAL
SERVICE CMNT-IMP: NORMAL
SERVICE CMNT-IMP: NORMAL
SODIUM SERPL-SCNC: 140 MMOL/L (ref 136–145)
TRIGL SERPL-MCNC: 115 MG/DL (ref 35–150)
WBC # BLD AUTO: 6.7 K/UL (ref 4.3–11.1)

## 2022-04-25 PROCEDURE — 84100 ASSAY OF PHOSPHORUS: CPT

## 2022-04-25 PROCEDURE — 74011000250 HC RX REV CODE- 250: Performed by: FAMILY MEDICINE

## 2022-04-25 PROCEDURE — 74011250636 HC RX REV CODE- 250/636: Performed by: SURGERY

## 2022-04-25 PROCEDURE — 80053 COMPREHEN METABOLIC PANEL: CPT

## 2022-04-25 PROCEDURE — 74011000250 HC RX REV CODE- 250: Performed by: SURGERY

## 2022-04-25 PROCEDURE — 83735 ASSAY OF MAGNESIUM: CPT

## 2022-04-25 PROCEDURE — 36415 COLL VENOUS BLD VENIPUNCTURE: CPT

## 2022-04-25 PROCEDURE — 85027 COMPLETE CBC AUTOMATED: CPT

## 2022-04-25 PROCEDURE — 74011250636 HC RX REV CODE- 250/636: Performed by: FAMILY MEDICINE

## 2022-04-25 PROCEDURE — 74011000258 HC RX REV CODE- 258: Performed by: SURGERY

## 2022-04-25 PROCEDURE — 84478 ASSAY OF TRIGLYCERIDES: CPT

## 2022-04-25 PROCEDURE — 65270000029 HC RM PRIVATE

## 2022-04-25 PROCEDURE — 97530 THERAPEUTIC ACTIVITIES: CPT

## 2022-04-25 PROCEDURE — 82962 GLUCOSE BLOOD TEST: CPT

## 2022-04-25 RX ORDER — LABETALOL HYDROCHLORIDE 5 MG/ML
20 INJECTION, SOLUTION INTRAVENOUS
Status: DISCONTINUED | OUTPATIENT
Start: 2022-04-25 | End: 2022-04-29 | Stop reason: HOSPADM

## 2022-04-25 RX ADMIN — PIPERACILLIN AND TAZOBACTAM 3.38 G: 3; .375 INJECTION, POWDER, LYOPHILIZED, FOR SOLUTION INTRAVENOUS at 00:46

## 2022-04-25 RX ADMIN — SODIUM CHLORIDE, PRESERVATIVE FREE 10 ML: 5 INJECTION INTRAVENOUS at 06:20

## 2022-04-25 RX ADMIN — HYDROMORPHONE HYDROCHLORIDE 1 MG: 1 INJECTION, SOLUTION INTRAMUSCULAR; INTRAVENOUS; SUBCUTANEOUS at 13:05

## 2022-04-25 RX ADMIN — PIPERACILLIN AND TAZOBACTAM 3.38 G: 3; .375 INJECTION, POWDER, LYOPHILIZED, FOR SOLUTION INTRAVENOUS at 09:11

## 2022-04-25 RX ADMIN — ENOXAPARIN SODIUM 40 MG: 40 INJECTION SUBCUTANEOUS at 09:11

## 2022-04-25 RX ADMIN — HYDROMORPHONE HYDROCHLORIDE 0.5 MG: 1 INJECTION, SOLUTION INTRAMUSCULAR; INTRAVENOUS; SUBCUTANEOUS at 09:11

## 2022-04-25 RX ADMIN — POTASSIUM CHLORIDE: 2 INJECTION, SOLUTION, CONCENTRATE INTRAVENOUS at 17:18

## 2022-04-25 RX ADMIN — KETOROLAC TROMETHAMINE 15 MG: 15 INJECTION, SOLUTION INTRAMUSCULAR; INTRAVENOUS at 12:13

## 2022-04-25 RX ADMIN — LEVOTHYROXINE SODIUM 100 MCG: 20 INJECTION, SOLUTION INTRAVENOUS at 18:29

## 2022-04-25 RX ADMIN — SODIUM CHLORIDE, PRESERVATIVE FREE 10 ML: 5 INJECTION INTRAVENOUS at 09:13

## 2022-04-25 RX ADMIN — PIPERACILLIN AND TAZOBACTAM 3.38 G: 3; .375 INJECTION, POWDER, LYOPHILIZED, FOR SOLUTION INTRAVENOUS at 17:41

## 2022-04-25 RX ADMIN — HYDROMORPHONE HYDROCHLORIDE 0.5 MG: 1 INJECTION, SOLUTION INTRAMUSCULAR; INTRAVENOUS; SUBCUTANEOUS at 21:28

## 2022-04-25 RX ADMIN — SOYBEAN OIL 250 ML: 20 INJECTION, SOLUTION INTRAVENOUS at 17:36

## 2022-04-25 RX ADMIN — KETOROLAC TROMETHAMINE 15 MG: 15 INJECTION, SOLUTION INTRAMUSCULAR; INTRAVENOUS at 06:20

## 2022-04-25 RX ADMIN — SODIUM CHLORIDE, PRESERVATIVE FREE 20 MG: 5 INJECTION INTRAVENOUS at 09:11

## 2022-04-25 RX ADMIN — KETOROLAC TROMETHAMINE 15 MG: 15 INJECTION, SOLUTION INTRAMUSCULAR; INTRAVENOUS at 17:39

## 2022-04-25 RX ADMIN — KETOROLAC TROMETHAMINE 15 MG: 15 INJECTION, SOLUTION INTRAMUSCULAR; INTRAVENOUS at 00:46

## 2022-04-25 RX ADMIN — SODIUM CHLORIDE, PRESERVATIVE FREE 10 ML: 5 INJECTION INTRAVENOUS at 21:28

## 2022-04-25 NOTE — PROGRESS NOTES
Medical record reviewed. Pt discussed in interdisciplinary rounds. Discharge likely in a few days. CM will continue to follow to assist with discharge planning.

## 2022-04-25 NOTE — PROGRESS NOTES
Problem: Mobility Impaired (Adult and Pediatric)  Goal: *Acute Goals and Plan of Care (Insert Text)  Outcome: Progressing Towards Goal  Note: DISCHARGE GOALS :  (1.)Mr. Selena Hart will move from supine to sit and sit to supine  with SUPERVISION (flat bed no rail) . (2.)Mr. Selena Hart will transfer from bed to chair and chair to bed with SUPERVISION. (3.)Mr. Selena Hart will ambulate with SUPERVISION for 400 feet . 4) pt able to go up & down 4 steps using a rail & SBA. Met 4/24    ________________________________________________________________________________________________      PHYSICAL THERAPY: Daily Note and AM 4/25/2022  INPATIENT: PT Visit Days : 4  [place] / [place] / Product Type: Medicare /       NAME/AGE/GENDER: Patricia Vee is a 80 y.o. male   PRIMARY DIAGNOSIS: [facility] obstruction) [facility] [K56.609] SBO (small bowel obstruction) (H[facility]) SBO (small bowel obstruction) (HCC)  Procedure(s) (LRB):  LAPAROTOMY EXPLORATORY POSSIBLE BOWEL RESECTION (N/A)  4 Days Post-Op  ICD-10: Treatment Diagnosis:   · Generalized Muscle Weakness (M62.81)  · Other lack of cordination (R27.8)  · Difficulty in walking, Not elsewhere classified (R26.2)  · Other abnormalities of gait and mobility (R26.89)   Precaution/Allergies:  Patient has no known allergies. ASSESSMENT:     Mr. Selena Hart continues to show steady gains with functional mobility & with tolerance to progressive activity. This pt is in good spirits  & very motivated to participate in progressive activity. This pt will needs to address balance instability that affects his overall safety with transfers & gait. Stability during [address] should improve more quickly once pt is able to receive some form of nutrition. 4/25 stand up in his home. Ambulated 650 ft pushing the IV pole without LOB,  Went up/down steps x 3. Then went to room and performs B UE in the chair with green thera-band.   B LE exercise in stand with green thera-band and LOB. Rest few min then ambulated another 650 ft pushing IV pole. Return to chair with needs in reach, and instructed to call for assist, before getting up. This section established at most recent assessment   PROBLEM LIST (Impairments causing functional limitations):  1. Decreased Strength  2. Decreased ADL/Functional Activities  3. Decreased Transfer Abilities  4. Decreased Ambulation Ability/Technique  5. Decreased Balance  6. Increased Pain  7. Decreased Activity Tolerance  8. Decreased Flexibility/Joint Mobility  9. Decreased Brick with Home Exercise Program   INTERVENTIONS PLANNED: (Benefits and precautions of physical therapy have been discussed with the patient.)  1. Balance Exercise  2. Bed Mobility  3. Gait Training  4. Therapeutic Activites  5. Therapeutic Exercise/Strengthening  6. Transfer Training     TREATMENT PLAN: Frequency/Duration: daily for duration of hospital stay  Rehabilitation Potential For Stated Goals: Good     REHAB RECOMMENDATIONS (at time of discharge pending progress):    Placement: It is my opinion, based on this patient's performance to date, that Mr. Giuliana Monreal may benefit from [address] after discharge due to the functional deficits listed above that are likely to improve with skilled rehabilitation because he/she has multiple medical issues that affect his/her functional mobility in the community. Equipment:    None at this time              HISTORY:   History of Present Injury/Illness (Reason for Referral):  En Collier is a 80 y.o. male with medical history of GERD s/p nissen fundoplication 6765, thyroid CA s/p resection, HTN, CAD who presented with epigastrict abdominal pain and RUQ pain that started around 4pm yesterday. Has not passed gas or had a BM since onset of symptoms. He had an outpatient CT today showing high grade SBO @ [facility] but [facility] be [facility] so he drove here.  Labs significant for WBC 19K with neutrophile predominance. rec'd zosyn in ED. General surgery was notified and requested hospitalist admission. Patient in severe discomfort on my eval. Family at bedside. No PO intake > 24 hours. Past Medical History/Comorbidities:   Mr. Torri Sue  has a past medical history of Arthritis, Chronic coronary artery disease, Gastroesophageal reflux disease, Gout, Hypercholesterolemia, Hypertension, Malignant melanoma, Papillary thyroid carcinoma, multifocal (3 tumors in the left lobe, largest tumor 0.9 cm), status post thyroidectomy/central lymph node dissection 5/26/2015 and 54.5 mCi iodine-131 7/22/2015, and Postsurgical hypothyroidism. Mr. Torri Sue  has a past surgical history that includes hx coronary stent placement (2010); hx malignant skin lesion excision (2011); hx cholecystectomy (2005); hx knee arthroscopy (Right, 2005); hx tonsillectomy (Age 4); hx appendectomy (1969); hx thyroidectomy (5/26/2015); hx gi (2008); and hx coronary artery bypass graft (08/19/2016). Social History/Living Environment:   Home Environment: Apartment  # Steps to Enter: 4  Rails to Enter: Yes  [employer] : Left  One/Two Story Residence: One story  Living Alone: No  Support Systems: Spouse/Significant Other Azeemlucretia Latha [phone])  Patient Expects to be Discharged to[de-identified] Home with family assistance  Current DME Used/Available at Home: None  Prior Level of Function/Work/Activity:  Pt was independent without an assistive device  Personal Factors: Other factors that influence how disability is experienced by the patient:  current  PMH   Number of Personal Factors/Comorbidities that affect the Plan of Care: 3+: HIGH COMPLEXITY   EXAMINATION:   Most Recent Physical Functioning:   Gross Assessment:                     Balance:  Sitting: Intact; Without support  Standing: Impaired; With support Bed Mobility:          Transfers:  Sit to Stand: Stand-by assistance  Stand to Sit: Stand-by assistance  Bed to Chair: Stand-by assistance  Duration: 40 Minutes (extra time to work through activities)  Gait:     Speed/Christine: Delayed  Gait Abnormalities: Decreased step clearance  Distance (ft): 650 Feet (ft) (x 2)  Assistive Device:  (pushing the IV pole)  Ambulation - Level of Assistance: Stand-by assistance  Number of Stairs Trained: 6 (x 3)  Stairs - Level of Assistance: Stand-by assistance  Rail Use: Right            Functional Mobility:         Gait/Ambulation: sba        Transfers: sba        Bed Mobility:  NT   Body Structures Involved:  1. Digestive Structures  2. Muscles Body Functions Affected:  1. Movement Related  2. Digestive Activities and Participation Affected:  1. General Tasks and Demands  2. Mobility   Number of elements that affect the Plan of Care: 4+: HIGH COMPLEXITY   CLINICAL PRESENTATION:   Presentation: Stable and uncomplicated: LOW COMPLEXITY   CLINICAL DECISION MAKIN[address] [place] [id number] AM-PAC 6 Clicks   Basic Mobility Inpatient Short Form  How much difficulty does the patient currently have. .. Unable A Lot A Little None   1. Turning over in bed (including adjusting bedclothes, sheets and blankets)? [] 1   [] 2   [x] 3   [] 4   2. Sitting down on and standing up from a chair with arms ( e.g., wheelchair, bedside commode, etc.)   [] 1   [] 2   [x] 3   [] 4   3. Moving from lying on back to sitting on the side of the bed? [] 1   [] 2   [x] 3   [] 4   How much help from another person does the patient currently need. .. Total A Lot A Little None   4. Moving to and from a bed to a chair (including a wheelchair)? [] 1   [] 2   [x] 3   [] 4   5. Need to walk in hospital room? [] 1   [] 2   [x] 3   [] 4   6. Climbing 3-5 steps with a railing? [x] 1   [] 2   [] 3   [] 4   © , Trustees of [address] [place] [address], under license to [employer].  All rights reserved      Score:  Initial: 16 Most Recent: X (Date: -- )    Interpretation of Tool:  Represents activities that are increasingly more difficult (i.e. Bed mobility, Transfers, Gait). Medical Necessity:     · Patient is expected to demonstrate progress in   · strength, range of motion, balance, coordination, and functional technique  ·  to   · decrease assistance required with bed mobility, transfers & gait  · .  Reason for Services/Other Comments:  · Patient continues to require skilled intervention due to   · Pt not independent with functional mobility  · . Use of outcome tool(s) and clinical judgement create a POC that gives a: Clear prediction of patient's progress: LOW COMPLEXITY            TREATMENT:   (In addition to Assessment/Re-Assessment sessions the following treatments were rendered)   Pre-treatment Symptoms/Complaints: pt agreeable  Pain: Initial: numeric scale  Pain Intensity 1: 2  Post Session:         Therapeutic Activity: (  40 Minutes (extra time to work through activities) ):  Therapeutic activities walking to laps around the floor without LOB. Work on stand exercises with green thera-band. Gait Training ( ):  Gait training to improve and/or restore physical functioning as related to mobility.   Ambulated 650 Feet (ft) (x 2) with Stand-by assistance using a  (pushing the IV pole) and minimal       Green Thera-band all exercises Date:  4/25 Date:   Date:     Activity/Exercise Parameters Parameters Parameters   Ankle pumps  20     Stand hip  abd/add  20     Stand  hip flex/ext 20     Stand knee flex/ext 20     Shoulder flex/ext 20     Shoulder abd/add 20     Elbow flex/ext 20                       Braces/Orthotics/Lines/Etc:   · IV  Treatment/Session Assessment:    · Response to Treatment: continue to make progress  · Interdisciplinary Collaboration:   o Physical Therapy Assistant  o Registered Nurse  · After treatment position/precautions:   o Up in chair  o Bed/Chair-wheels locked  o Caregiver at bedside  o Call light within reach  o RN notified  o Family at bedside   · Compliance with Program/Exercises: Compliant all of the time  · Recommendations/Intent for next treatment session: \"Next visit will focus on reduction in assistance provided\".   Total Treatment Duration:  PT Patient Time In/Time Out  Time In: 1000  Time Out: One Umang Powers, PTA

## 2022-04-25 NOTE — PROGRESS NOTES
Progress Note    Patient: Tisha Arango. MRN: [id number]  SSN: [id number]    YOB: 1950  Age: 80 y.o. Sex: male      Admit Date: 2022    4 Days Post-Op    Procedure:  Procedure(s):  LAPAROTOMY EXPLORATORY POSSIBLE BOWEL RESECTION    Subjective:     Patient reports elevated blood pressures but he thinks part of this is because his pain (6/10) is not well controlled. No Flatus or bowel movements. Ear and throat pain, chloroseptic not working.        Objective:     Visit Vitals  BP (!) 176/76 (BP 1 Location: Right upper arm, BP Patient Position: Sitting)   Pulse (!) 57   Temp 98 °F (36.7 °C)   Resp 16   Ht 5' 8\" (1.727 m)   Wt 178 lb (80.7 kg)   SpO2 97%   BMI 27.06 kg/m²       Temp (24hrs), Av.9 °F (36.6 °C), Min:97.4 °F (36.3 °C), Max:98.4 °F (36.9 °C)      Physical Exam:     NAD  NG in place with bili output (150ml 12/hr)  Abdomen: soft, non-tender, dressing CDI       Assessment:     Hospital Problems  Date Reviewed: 2021          Codes Class Noted POA    Leukocytosis ICD-10-CM: D72.829  ICD-9-CM: 288.60  2022 Yes        Generalized abdominal pain ICD-10-CM: R10.84  ICD-9-CM: 789.07  2022 Yes        Transaminitis ICD-10-CM: R74.01  ICD-9-CM: 790.4  2022 No        Acute kidney injury (GINNY) with acute tubular necrosis (ATN) ([facility]) ICD-10-CM: N17.0  ICD-9-CM: 584.5  2022 Yes        * (Principal) SBO (small bowel obstruction) ([facility] ICD-10-CM: P10.591  ICD-9-CM: 560.9  2022 Yes    Overview Addendum 2022  1:49 PM by Edwin Garrido MD     22 s/p expl lap and reduction of internal hernia for high grade SBO; Dr [employer]             Postsurgical hypothyroidism ICD-10-CM: E89.0  ICD-9-CM: 244.0  Unknown Yes        Papillary thyroid carcinoma, multifocal (3 tumors in the left lobe, largest tumor 0.9 cm), status post thyroidectomy/central lymph node dissection 2015 and 54.5 mCi iodine-131 2015 ICD-10-CM: C73  ICD-9-CM: 771  2016 Yes GERD (gastroesophageal reflux disease) ICD-10-CM: K21.9  ICD-9-CM: 530.81  Unknown Yes        Chronic coronary artery disease ICD-10-CM: I25.10  ICD-9-CM: 414.00  9/3/2015 Yes    Overview Signed 6/21/2016  8:29 AM by Estee JOHNS     Overview:   BMS RCA 2008; NORMAL STRESS TEST 2014    Last Assessment & Plan:   He does not have any anginal quality chest pain. He occasionally has sharp atypical pain. His stress test was normal less than a year ago. I don't plan any further investigation.                    Plan/Recommendations/Medical Decision Making:     Toradol added for pain over weekend   PPN w Electrolyte replacements  Await bowel function return- NG in place  Continue NG tube suction and n.p.o. status  Lovenox for DVT prophy

## 2022-04-25 NOTE — PROGRESS NOTES
Hospitalist Progress Note   Admit Date:  2022  4:44 PM   Name:  Nahed Davis. Age:  80 y.o. Sex:  male  :  1950   MRN:  [id number]   Room:  341/01    Presenting Complaint: Abdominal Pain and Abnormal Lab Results    Reason(s) for Admission: SBO (small bowel obstruction) [facility] Course & Interval History:   Nahed De Jesus is a 80 y.o. male with medical history of GERD s/p nissen fundoplication , thyroid CA s/p resection, HTN, CAD who presented with epigastrict abdominal pain and RUQ pain that started around 4pm yesterday. Has not passed gas or had a BM since onset of symptoms. He had an outpatient CT today showing high grade SBO @ [facility] but wanted to be admitted at [address] [place] [address] so he drove here. Labs significant for WBC 19K with neutrophile predominance. rec'd zosyn in ED. General surgery was notified and requested hospitalist admission. Patient in severe discomfort on my eval. Family at bedside. No PO intake > 24 hours    He is s/p Nissen fundoplication performed in  by Dr. Taylor Henry at an outside hospital and reports he has been unable to vomit since then. He is s/p laparoscopic cholecystectomy  by Dr. Taylor Henry  He is s/p open appendectomy . He is s/p colonoscopy in 2019 and reports this was normal with his next planned in 5 years.     OSH CT a/p - Findings consistent with high-grade small bowel obstruction with transition point within the right upper quadrant. Admitted NPO, NGT, IVF, Zosyn with no improvement overnight and urgently transferred to OR. Now s/p expl laparotomy and reduction of internal hernia for high grade SBO on 22. Small bowel entrapped in the hernia was ischemic and injured but not resected and looked injury looked reversible but will take some time per , surgery.  - TPN initiated     Subjective/24hr Events (22):  POD4 feeling better today. Slept better. Feeling rumbling in his belly.  No flatus or BM yet   ROS:  10 systems reviewed and negative except as noted above. Assessment & Plan:     High grade SBO with SIRS - refractory to bowel compression and rest. Now s/p expl laparotomy and reduction of internal hernia for high grade SBO on 4/21/22. Small bowel entrapped in the hernia was ischemic and injured but not resected and looked injury looked reversible but will take some time per , surgery. NGT to LIS. Zosyn. TPN. Monitor electrolytes and volume status. Acute liver injury - responded to medical therapy. LFTs normalized s/p surgery, abx and IVF. Trend CMP. Suspect 2/2 early sepsis from ischemic bowel. RUQ US showed normal size liver and heterogenous appearance and some contour nodularity suggested. No h/o alcohol abuse. H/o hepatoxic agent accicdentally taken double the dose years ago and was closely monitored for hepatic failure thereafter. Acute hepatitis panel negative. Lab Results   Component Value Date/Time    ALT (SGPT) 50 04/25/2022 06:06 AM    AST (SGOT) 20 04/25/2022 06:06 AM    Alk. phosphatase 71 04/25/2022 06:06 AM    Bilirubin, total 0.5 04/25/2022 06:06 AM       GINNY 2/2 ATN - improved s/p IVF. Tan Rich Now back to baseline Scr ~1.0. cont. IVF, supportive care. Lab Results   Component Value Date/Time    Creatinine 1.06 04/25/2022 06:06 AM     Hyperglycemia - 2/2 severe illness, fasting  on admission now improved. a1c 5.6%. cont SSI q6h while NPO     Postsurgical hypothyroidism - started IV synthroid on 4/24    HTN - hold home meds.      CAD - BB held due to bradycardia. Other meds held due to NPO state. Chronic coronary artery disease       Papillary thyroid carcinoma, multifocal (3 tumors in the left lobe, largest tumor 0.9 cm), status post thyroidectomy/central lymph node dissection 5/26/2015 and 54.5 mCi iodine-131     - follows with endocrinology outpatient.        GERD s/p buzz fundoplication - IV pepcid            Discharge Planning:      Not stable     Diet:  DIET NPO  DVT PPx: lovenox  Code status: Full Code    Hospital Problems as of 4/25/2022 Date Reviewed: 12/14/2021          Codes Class Noted - Resolved POA    Leukocytosis ICD-10-CM: D72.829  ICD-9-CM: 288.60  4/21/2022 - Present Yes        Generalized abdominal pain ICD-10-CM: R10.84  ICD-9-CM: 789.07  4/21/2022 - Present Yes        Transaminitis ICD-10-CM: R74.01  ICD-9-CM: 790.4  4/21/2022 - Present No        Acute kidney injury (GINNY) with acute tubular necrosis (ATN) ([facility] ICD-10-CM: N17.0  ICD-9-CM: 584.5  4/21/2022 - Present Yes        * (Principal) SBO (small bowel obstruction) ([facility] ICD-10-CM: H53.267  ICD-9-CM: 560.9  4/20/2022 - Present Yes    Overview Addendum 4/21/2022  1:49 PM by Danni Priest MD     4/21/22 s/p expl lap and reduction of internal hernia for high grade SBO; Dr Ana Patterson             Postsurgical hypothyroidism ICD-10-CM: E89.0  ICD-9-CM: 244.0  Unknown - Present Yes        Papillary thyroid carcinoma, multifocal (3 tumors in the left lobe, largest tumor 0.9 cm), status post thyroidectomy/central lymph node dissection 5/26/2015 and 54.5 mCi iodine-131 7/22/2015 ICD-10-CM: C73  ICD-9-CM: 771  6/21/2016 - Present Yes        GERD (gastroesophageal reflux disease) ICD-10-CM: K21.9  ICD-9-CM: 530.81  Unknown - Present Yes        Chronic coronary artery disease ICD-10-CM: I25.10  ICD-9-CM: 414.00  9/3/2015 - Present Yes    Overview Signed 6/21/2016  8:29 AM by Katheryn JOHNS     Overview:   BMS RCA 2008; NORMAL STRESS TEST 2014    Last Assessment & Plan:   He does not have any anginal quality chest pain. He occasionally has sharp atypical pain. His stress test was normal less than a year ago. I don't plan any further investigation.                    Objective:     Patient Vitals for the past 24 hrs:   Temp Pulse Resp BP SpO2   04/25/22 0317 97.6 °F (36.4 °C) (!) 59 14 (!) 174/75 98 %   04/24/22 2336 97.7 °F (36.5 °C) (!) 51 16 (!) 149/69 96 %   04/24/22 1944 97.4 °F (36.3 °C) 61 14 (!) 154/60 98 %   04/24/22 1609  62  (!) 176/71 97 %   04/24/22 1608 98.4 °F (36.9 °C) 62  (!) 174/78 97 %   04/24/22 1241 98.3 °F (36.8 °C) 62 16 (!) 141/80 99 %   04/24/22 0815 98.6 °F (37 °C) (!) 59 18 (!) 169/68 98 %     Oxygen Therapy  O2 Sat (%): 98 % (04/25/22 0317)  O2 Device: None (Room air) (04/24/22 2336)  O2 Flow Rate (L/min): 10 l/min (04/21/22 1416)    Estimated body mass index is 27.06 kg/m² as calculated from the following:    Height as of this encounter: 5' 8\" (1.727 m). Weight as of this encounter: 80.7 kg (178 lb). Intake/Output Summary (Last 24 hours) at 4/25/2022 0720  Last data filed at 4/24/2022 1940  Gross per 24 hour   Intake    Output 400 ml   Net -400 ml         Physical Exam:     Blood pressure (!) 174/75, pulse (!) 59, temperature 97.6 °F (36.4 °C), resp. rate 14, height 5' 8\" (1.727 m), weight 80.7 kg (178 lb), SpO2 98 %. General:    Less Ill appearing. Resting  In bed   Head:  Normocephalic, atraumatic  Eyes:  Sclerae appear normal.  Pupils equally round. ENT:  NGT in place. Neck:  No restricted ROM. Trachea midline   CV:   RRR. No m/r/g. No jugular venous distension. Lungs:   CTAB. No wheezing, rhonchi, or rales. Respirations even, unlabored  Abdomen: Bowel sounds present. Soft. Mild Distended. Mild tender  Extremities: No cyanosis or clubbing. No edema  Skin:     No rashes and normal coloration. Warm and dry. Neuro:  CN II-XII grossly intact. Sensation intact. A&Ox3  Psych:  Normal mood and affect.       I have reviewed ordered lab tests and independently visualized imaging below:    Recent Labs:  Recent Results (from the past 48 hour(s))   GLUCOSE, POC    Collection Time: 04/23/22 11:39 AM   Result Value Ref Range    Glucose (POC) 108 (H) 65 - 100 mg/dL    Performed by Sherwin    GLUCOSE, POC    Collection Time: 04/23/22  6:07 PM   Result Value Ref Range    Glucose (POC) 94 65 - 100 mg/dL    Performed by Sherwin GLUCOSE, POC    Collection Time: 04/24/22 12:25 AM   Result Value Ref Range    Glucose (POC) 99 65 - 100 mg/dL    Performed by Jose Alejandro    CBC W/O DIFF    Collection Time: 04/24/22  4:57 AM   Result Value Ref Range    WBC 6.4 4.3 - 11.1 K/uL    RBC 4.08 (L) 4.23 - 5.6 M/uL    HGB 13.0 (L) 13.6 - 17.2 g/dL    HCT 39.0 (L) 41.1 - 50.3 %    MCV 95.6 79.6 - 97.8 FL    MCH 31.9 26.1 - 32.9 PG    MCHC 33.3 31.4 - 35.0 g/dL    RDW 13.2 11.9 - 14.6 %    PLATELET 410 132 - 125 K/uL    MPV 8.3 (L) 9.4 - 12.3 FL    ABSOLUTE NRBC 0.00 0.0 - 0.2 K/uL   METABOLIC PANEL, COMPREHENSIVE    Collection Time: 04/24/22  4:57 AM   Result Value Ref Range    Sodium 139 136 - 145 mmol/L    Potassium 3.6 3.5 - 5.1 mmol/L    Chloride 106 98 - 107 mmol/L    CO2 30 21 - 32 mmol/L    Anion gap 3 (L) 7 - 16 mmol/L    Glucose 104 (H) 65 - 100 mg/dL    BUN 9 8 - 23 MG/DL    Creatinine 1.00 0.8 - 1.5 MG/DL    GFR est AA >60 >60 ml/min/1.73m2    GFR est non-AA >60 >60 ml/min/1.73m2    Calcium 9.2 8.3 - 10.4 MG/DL    Bilirubin, total 0.8 0.2 - 1.1 MG/DL    ALT (SGPT) 54 12 - 65 U/L    AST (SGOT) 15 15 - 37 U/L    Alk.  phosphatase 64 50 - 136 U/L    Protein, total 5.9 (L) 6.3 - 8.2 g/dL    Albumin 2.5 (L) 3.2 - 4.6 g/dL    Globulin 3.4 2.3 - 3.5 g/dL    A-G Ratio 0.7 (L) 1.2 - 3.5     MAGNESIUM    Collection Time: 04/24/22  4:57 AM   Result Value Ref Range    Magnesium 2.0 1.8 - 2.4 mg/dL   PHOSPHORUS    Collection Time: 04/24/22  4:57 AM   Result Value Ref Range    Phosphorus 2.5 2.3 - 3.7 MG/DL   TRIGLYCERIDE    Collection Time: 04/24/22  4:57 AM   Result Value Ref Range    Triglyceride 87 35 - 150 MG/DL   GLUCOSE, POC    Collection Time: 04/24/22  5:41 AM   Result Value Ref Range    Glucose (POC) 95 65 - 100 mg/dL    Performed by Jose Alejandro    GLUCOSE, POC    Collection Time: 04/24/22 12:02 PM   Result Value Ref Range    Glucose (POC) 103 (H) 65 - 100 mg/dL    Performed by Sherwin    GLUCOSE, POC    Collection Time: 04/24/22  6:13 PM   Result Value Ref Range    Glucose (POC) 79 65 - 100 mg/dL    Performed by Sherwin    GLUCOSE, POC    Collection Time: 04/25/22 12:44 AM   Result Value Ref Range    Glucose (POC) 141 (H) 65 - 100 mg/dL    Performed by Gerardo    CBC W/O DIFF    Collection Time: 04/25/22  6:06 AM   Result Value Ref Range    WBC 6.7 4.3 - 11.1 K/uL    RBC 4.18 (L) 4.23 - 5.6 M/uL    HGB 13.6 13.6 - 17.2 g/dL    HCT 38.8 (L) 41.1 - 50.3 %    MCV 92.8 79.6 - 97.8 FL    MCH 32.5 26.1 - 32.9 PG    MCHC 35.1 (H) 31.4 - 35.0 g/dL    RDW 13.2 11.9 - 14.6 %    PLATELET 460 416 - 815 K/uL    MPV 8.6 (L) 9.4 - 12.3 FL    ABSOLUTE NRBC 0.00 0.0 - 0.2 K/uL   METABOLIC PANEL, COMPREHENSIVE    Collection Time: 04/25/22  6:06 AM   Result Value Ref Range    Sodium 140 136 - 145 mmol/L    Potassium 3.9 3.5 - 5.1 mmol/L    Chloride 108 (H) 98 - 107 mmol/L    CO2 26 21 - 32 mmol/L    Anion gap 6 (L) 7 - 16 mmol/L    Glucose 103 (H) 65 - 100 mg/dL    BUN 15 8 - 23 MG/DL    Creatinine 1.06 0.8 - 1.5 MG/DL    GFR est AA >60 >60 ml/min/1.73m2    GFR est non-AA >60 >60 ml/min/1.73m2    Calcium 8.8 8.3 - 10.4 MG/DL    Bilirubin, total 0.5 0.2 - 1.1 MG/DL    ALT (SGPT) 50 12 - 65 U/L    AST (SGOT) 20 15 - 37 U/L    Alk.  phosphatase 71 50 - 136 U/L    Protein, total 6.2 (L) 6.3 - 8.2 g/dL    Albumin 2.7 (L) 3.2 - 4.6 g/dL    Globulin 3.5 2.3 - 3.5 g/dL    A-G Ratio 0.8 (L) 1.2 - 3.5     PHOSPHORUS    Collection Time: 04/25/22  6:06 AM   Result Value Ref Range    Phosphorus 4.0 (H) 2.3 - 3.7 MG/DL   MAGNESIUM    Collection Time: 04/25/22  6:06 AM   Result Value Ref Range    Magnesium 2.2 1.8 - 2.4 mg/dL   TRIGLYCERIDE    Collection Time: 04/25/22  6:06 AM   Result Value Ref Range    Triglyceride 115 35 - 150 MG/DL   GLUCOSE, POC    Collection Time: 04/25/22  6:31 AM   Result Value Ref Range    Glucose (POC) 110 (H) 65 - 100 mg/dL    Performed by [facility]        All Micro Results     Procedure Component Value Units Date/Time    CULTURE, BLOOD [[id number]] Collected: 04/20/22 1907    Order Status: Completed Specimen: Blood Updated: 04/24/22 0342     Special Requests: --        RIGHT  FOREARM       Culture result: NO GROWTH 4 DAYS       CULTURE, BLOOD [[id number]] Collected: 04/20/22 1759    Order Status: Completed Specimen: Blood Updated: 04/24/22 0342     Special Requests: --        NO SPECIAL REQUESTS  LEFT  Antecubital       Culture result: NO GROWTH 4 DAYS             Other Studies:  No results found.     Current Meds:  Current Facility-Administered Medications   Medication Dose Route Frequency    labetaloL (NORMODYNE;TRANDATE) injection 20 mg  20 mg IntraVENous Q4H PRN    TPN ADULT - PERIPHERAL - dextrose 5% amino acid 4.25%   IntraVENous QPM    fat emulsion 20% (LIPOSYN, INTRAlipid) infusion 250 mL  250 mL IntraVENous QPM    ketorolac (TORADOL) injection 15 mg  15 mg IntraVENous Q6H    levothyroxine (SYNTHROID) injection 100 mcg  100 mcg IntraVENous Q24H    carboxymethylcellulose sodium (REFRESH LIQUIGEL) 1 % ophthalmic solution 1 Drop  1 Drop Both Eyes PRN    NUTRITIONAL SUPPORT ELECTROLYTE PRN ORDERS   Does Not Apply PRN    HYDROmorphone (DILAUDID) injection 0.5 mg  0.5 mg IntraVENous Q3H PRN    Or    HYDROmorphone (DILAUDID) injection 1 mg  1 mg IntraVENous Q3H PRN    phenol throat spray (CHLORASEPTIC) 1 Spray  1 Spray Oral PRN    sodium chloride (NS) flush 5-40 mL  5-40 mL IntraVENous Q8H    sodium chloride (NS) flush 5-40 mL  5-40 mL IntraVENous PRN    acetaminophen (TYLENOL) tablet 650 mg  650 mg Oral Q6H PRN    Or    acetaminophen (TYLENOL) suppository 650 mg  650 mg Rectal Q6H PRN    ondansetron (ZOFRAN) injection 4 mg  4 mg IntraVENous Q6H PRN    enoxaparin (LOVENOX) injection 40 mg  40 mg SubCUTAneous DAILY    piperacillin-tazobactam (ZOSYN) 3.375 g in 0.9% sodium chloride (MBP/ADV) 100 mL MBP  3.375 g IntraVENous Q8H    glucose chewable tablet 16 g  16 g Oral PRN    glucagon (GLUCAGEN) injection 1 mg  1 mg IntraMUSCular PRN    dextrose 10% infusion 125-250 mL  125-250 mL IntraVENous PRN    insulin regular (NOVOLIN R, HUMULIN R) injection   SubCUTAneous Q6H    famotidine (PF) (PEPCID) 20 mg in 0.9% sodium chloride 10 mL injection  20 mg IntraVENous DAILY    naloxone (NARCAN) injection 0.2 mg  0.2 mg IntraVENous EVERY 2 MINUTES AS NEEDED       Signed:  Neptali Garcia DO    Part of this note may have been written by using a voice dictation software. The note has been proof read but may still contain some grammatical/other typographical errors.

## 2022-04-25 NOTE — PROGRESS NOTES
Comprehensive Nutrition Assessment    Type and Reason for Visit: Reassess  TPN Management (Hospitalist)    Nutrition Recommendations/Plan:   Parenteral Nutrition:  Peripheral parenteral nutrition new bag to begin at 1800  Continue: Dex 5%, 4.25% AA 2.4 L (100ml/hr)   Continue 250 ml 20% lipids daily  To provide: 1316 kcal/d (80% of needs), 102 grams of protein/d (100% of needs), 408 grams of CHO/d and 2650 ml of total volume/d. Lytes/L:   Sodium 50 meq (50 meq NaCl), Potassium 25 meq (15 meq KCL,10 meq KPO4), 5 meq Mg,  Calcium-none. Osmolarity 816  Other additives: MTE, MVI MWF due to national shortages, Start 100 mg Thiamine x 4 days 4/25 as provider ordered 3 day dose with last dose 4/24  Nutritional Supplement Therapy:   Active electrolyte replacement per nutrition support protocols  Replacement indicated:  None  Labs:   BMP daily  Mg MWF  Phos MWF    POC Glucoses/SSI Not indicated/discontinue     Malnutrition Assessment:  Malnutrition Status: At risk for malnutrition (specify) (NPO X 6 days)    Nutrition Assessment:   Nutrition History:    4/24: Called and spoke with wife as nutrition assessment was completed remotely from [facility]. She reports no changes to PO intake until acutely just prior to admission. She states that patient typically eats lunch and dinner. Noted from previous nutrition note, patient with intentional weight loss PTA. Wife confirms. 4/25: Wife notes that tomorrow will be 1 week of no po intake. Nutrition Background:   Patient with PMH significant for GERD with Nissen fundoplication, thyroid cancer s/p XRT, HTN, CAD. He presented with epigastric pain. He had outpatient CT with [facility] showing high grade SBO, but wanted to be admitted to [facility] so he drove here. He is now s/p lap with reduction of internal hernia 4/21. Small bowel was trapped in hernia and was ischemic but not resected. Discussed with Marquis Maryann RN. Pt has 2 intact PIV.    Nutrition Interval:  Pt seen in company of wife and daughter. PPN started 4/25 d/t need for continued NPO status d/t post-op ileus. No flatus or BM yet, but feels active. JOHNSON: 550 ml over last 24 hrs  Wife asks if NGT will be removed before or after start of po intake. Pt does not want to have NGT replaced if he fails po trials. Discussed that NGT can remain in while taking po intake and suggested this be discussed with surgery service. Abdominal Status (last documented): Semi-soft,Tender abdomen with Hypoactive  bowel sounds. Last BM  . Pertinent Medications: SSI (none required), Pepcid 20 mg/d, Thiamine 100 mg x3 doses (last dose 4/24), Zosyn TID  Pertinent Labs:   Lab Results   Component Value Date/Time    Sodium 140 04/25/2022 06:06 AM    Potassium 3.9 04/25/2022 06:06 AM    Chloride 108 (H) 04/25/2022 06:06 AM    CO2 26 04/25/2022 06:06 AM    Anion gap 6 (L) 04/25/2022 06:06 AM    Glucose 103 (H) 04/25/2022 06:06 AM    BUN 15 04/25/2022 06:06 AM    Creatinine 1.06 04/25/2022 06:06 AM    Calcium 8.8 04/25/2022 06:06 AM    Albumin 2.7 (L) 04/25/2022 06:06 AM    Magnesium 2.2 04/25/2022 06:06 AM    Phosphorus 4.0 (H) 04/25/2022 06:06 AM     Lab Results   Component Value Date/Time    Triglyceride 115 04/25/2022 06:06 AM     Lab Results   Component Value Date/Time    Glucose 103 (H) 04/25/2022 06:06 AM    Glucose (POC) 110 (H) 04/25/2022 06:31 AM    Glucose (POC) 141 (H) 04/25/2022 12:44 AM    Glucose (POC) 79 04/24/2022 06:13 PM    Glucose (POC) 103 (H) 04/24/2022 12:02 PM    Glucose (POC) 95 04/24/2022 05:41 AM    Glucose (POC) 99 04/24/2022 12:25 AM   Labs remarkable for K trending up, NaCl trending up. Zosyn provides significant NaCl  Slightly increased Phos. Current PN provides 60 meq/d. May benefit from decreased Phos in PN. No SSI utilized/no h/o DM. HgA1C 5.6. Could discontinue POC glucoses and SSI.    TG acceptable to continue daily lipid    Nutrition Related Findings:   4/25: No visible fat or muscle wasting, 4/24: PPN started      Current Nutrition Therapies:  DIET NPO  TPN ADULT - dextrose 5% amino acid 4.25%   PPN[de-identified] Dex 5%, 4.25% AA 2.4 L (100ml/hr), 250 ml 20% lipids daily . To provide: 1316 kcal/d (80% of needs), 102 grams of protein/d (100% of needs), 408 grams of CHO/d and 2650 ml of total volume/d. Current Intake:   Average Meal Intake: NPO        Anthropometric Measures:  Height: 5' 8\" (172.7 cm)  Current Body Wt: 80.7 kg (177 lb 14.6 oz) (4/20), Weight source: Stated  BMI: 27.1, Overweight (BMI 25.0-29. 9)  Admission Body Weight: 177 lb 14.6 oz  Ideal Body Weight (lbs) (Calculated): 154 lbs (70 kg), 115.5 %  Usual Body Wt: 80.7 kg (178 lb) (last office weight 2/2022), Percent weight change: 0       Edema: No data recorded  Estimated Daily Nutrient Needs:  Energy (kcal/day): 9307-3676 (Kcal/kg (20-25) Weight used: Current (80.7 kg (4/20))    Protein (g/day):  (1.2-1.3 g/kg s/p surgery) Weight Used: (Current) 80.7 kg  Fluid (ml/day):   (1 ml/kcal)    Nutrition Diagnosis:   · Inadequate oral intake related to altered GI function as evidenced by NPO or clear liquid status due to medical condition (post-op ileus)    Nutrition Interventions:   Food and/or Nutrient Delivery: Continue NPO,Continue parenteral nutrition     Coordination of Nutrition Care: Continue to monitor while inpatient,Interdisciplinary rounds       Goals:   Previous Goal Met: Goal(s) achieved  Active Goal: Tolerate nutrition support at goal rate       Nutrition Monitoring and Evaluation:      Food/Nutrient Intake Outcomes: Parenteral nutrition intake/tolerance  Physical Signs/Symptoms Outcomes: Biochemical data,GI status,Fluid status or edema,Weight    Discharge Planning:     Too soon to determine    May Wellington, [address], [address] [place] [address], [address] [place] [address]

## 2022-04-26 PROBLEM — D72.829 LEUKOCYTOSIS: Status: RESOLVED | Noted: 2022-04-21 | Resolved: 2022-04-26

## 2022-04-26 PROBLEM — Z78.9 ON TOTAL PARENTERAL NUTRITION (TPN): Status: ACTIVE | Noted: 2022-04-26

## 2022-04-26 PROBLEM — R10.84 GENERALIZED ABDOMINAL PAIN: Status: RESOLVED | Noted: 2022-04-21 | Resolved: 2022-04-26

## 2022-04-26 PROBLEM — R74.01 TRANSAMINITIS: Status: RESOLVED | Noted: 2022-04-21 | Resolved: 2022-04-26

## 2022-04-26 PROBLEM — Z98.890 STATUS POST EXPLORATORY LAPAROTOMY: Status: ACTIVE | Noted: 2022-04-26

## 2022-04-26 LAB
ALBUMIN SERPL-MCNC: 2.6 G/DL (ref 3.2–4.6)
ALBUMIN/GLOB SERPL: 0.8 {RATIO} (ref 1.2–3.5)
ALP SERPL-CCNC: 77 U/L (ref 50–136)
ALT SERPL-CCNC: 50 U/L (ref 12–65)
ANION GAP SERPL CALC-SCNC: 6 MMOL/L (ref 7–16)
AST SERPL-CCNC: 26 U/L (ref 15–37)
BILIRUB SERPL-MCNC: 0.4 MG/DL (ref 0.2–1.1)
BUN SERPL-MCNC: 19 MG/DL (ref 8–23)
CALCIUM SERPL-MCNC: 8.6 MG/DL (ref 8.3–10.4)
CHLORIDE SERPL-SCNC: 110 MMOL/L (ref 98–107)
CO2 SERPL-SCNC: 25 MMOL/L (ref 21–32)
CREAT SERPL-MCNC: 1 MG/DL (ref 0.8–1.5)
ERYTHROCYTE [DISTWIDTH] IN BLOOD BY AUTOMATED COUNT: 13.1 % (ref 11.9–14.6)
GLOBULIN SER CALC-MCNC: 3.1 G/DL (ref 2.3–3.5)
GLUCOSE SERPL-MCNC: 104 MG/DL (ref 65–100)
HCT VFR BLD AUTO: 35.8 % (ref 41.1–50.3)
HGB BLD-MCNC: 12.4 G/DL (ref 13.6–17.2)
MCH RBC QN AUTO: 32.4 PG (ref 26.1–32.9)
MCHC RBC AUTO-ENTMCNC: 34.6 G/DL (ref 31.4–35)
MCV RBC AUTO: 93.5 FL (ref 79.6–97.8)
NRBC # BLD: 0 K/UL (ref 0–0.2)
PLATELET # BLD AUTO: 262 K/UL (ref 150–450)
PMV BLD AUTO: 8.6 FL (ref 9.4–12.3)
POTASSIUM SERPL-SCNC: 3.7 MMOL/L (ref 3.5–5.1)
PROT SERPL-MCNC: 5.7 G/DL (ref 6.3–8.2)
RBC # BLD AUTO: 3.83 M/UL (ref 4.23–5.6)
SODIUM SERPL-SCNC: 141 MMOL/L (ref 136–145)
WBC # BLD AUTO: 7.1 K/UL (ref 4.3–11.1)

## 2022-04-26 PROCEDURE — 74011000250 HC RX REV CODE- 250: Performed by: FAMILY MEDICINE

## 2022-04-26 PROCEDURE — 74011250636 HC RX REV CODE- 250/636: Performed by: FAMILY MEDICINE

## 2022-04-26 PROCEDURE — 80053 COMPREHEN METABOLIC PANEL: CPT

## 2022-04-26 PROCEDURE — 74011250636 HC RX REV CODE- 250/636: Performed by: SURGERY

## 2022-04-26 PROCEDURE — 97530 THERAPEUTIC ACTIVITIES: CPT

## 2022-04-26 PROCEDURE — 85027 COMPLETE CBC AUTOMATED: CPT

## 2022-04-26 PROCEDURE — 65270000029 HC RM PRIVATE

## 2022-04-26 PROCEDURE — 74011000258 HC RX REV CODE- 258: Performed by: SURGERY

## 2022-04-26 PROCEDURE — 74011000250 HC RX REV CODE- 250: Performed by: SURGERY

## 2022-04-26 PROCEDURE — 36415 COLL VENOUS BLD VENIPUNCTURE: CPT

## 2022-04-26 RX ADMIN — ENOXAPARIN SODIUM 40 MG: 40 INJECTION SUBCUTANEOUS at 08:50

## 2022-04-26 RX ADMIN — SODIUM CHLORIDE, PRESERVATIVE FREE 10 ML: 5 INJECTION INTRAVENOUS at 08:58

## 2022-04-26 RX ADMIN — PIPERACILLIN AND TAZOBACTAM 3.38 G: 3; .375 INJECTION, POWDER, LYOPHILIZED, FOR SOLUTION INTRAVENOUS at 18:02

## 2022-04-26 RX ADMIN — PIPERACILLIN AND TAZOBACTAM 3.38 G: 3; .375 INJECTION, POWDER, LYOPHILIZED, FOR SOLUTION INTRAVENOUS at 08:51

## 2022-04-26 RX ADMIN — LEVOTHYROXINE SODIUM 100 MCG: 20 INJECTION, SOLUTION INTRAVENOUS at 18:02

## 2022-04-26 RX ADMIN — HYDROMORPHONE HYDROCHLORIDE 0.5 MG: 1 INJECTION, SOLUTION INTRAMUSCULAR; INTRAVENOUS; SUBCUTANEOUS at 00:30

## 2022-04-26 RX ADMIN — ONDANSETRON 4 MG: 2 INJECTION INTRAMUSCULAR; INTRAVENOUS at 08:50

## 2022-04-26 RX ADMIN — HYDROMORPHONE HYDROCHLORIDE 1 MG: 1 INJECTION, SOLUTION INTRAMUSCULAR; INTRAVENOUS; SUBCUTANEOUS at 15:35

## 2022-04-26 RX ADMIN — SODIUM CHLORIDE, PRESERVATIVE FREE 10 ML: 5 INJECTION INTRAVENOUS at 21:03

## 2022-04-26 RX ADMIN — SODIUM CHLORIDE, PRESERVATIVE FREE 10 ML: 5 INJECTION INTRAVENOUS at 05:25

## 2022-04-26 RX ADMIN — POTASSIUM CHLORIDE: 2 INJECTION, SOLUTION, CONCENTRATE INTRAVENOUS at 18:02

## 2022-04-26 RX ADMIN — PIPERACILLIN AND TAZOBACTAM 3.38 G: 3; .375 INJECTION, POWDER, LYOPHILIZED, FOR SOLUTION INTRAVENOUS at 00:02

## 2022-04-26 RX ADMIN — HYDROMORPHONE HYDROCHLORIDE 1 MG: 1 INJECTION, SOLUTION INTRAMUSCULAR; INTRAVENOUS; SUBCUTANEOUS at 09:03

## 2022-04-26 RX ADMIN — SOYBEAN OIL 250 ML: 20 INJECTION, SOLUTION INTRAVENOUS at 18:02

## 2022-04-26 RX ADMIN — SODIUM CHLORIDE, PRESERVATIVE FREE 20 MG: 5 INJECTION INTRAVENOUS at 08:50

## 2022-04-26 NOTE — PROGRESS NOTES
Problem: Mobility Impaired (Adult and Pediatric)  Goal: *Acute Goals and Plan of Care (Insert Text)  Outcome: Progressing Towards Goal  Note: DISCHARGE GOALS :  (1.)Mr. Marcela Villanueva will move from supine to sit and sit to supine  with SUPERVISION (flat bed no rail) not tested  (2.)Mr. Marcela Villanueva will transfer from bed to chair and chair to bed with SUPERVISION. 4/26  (3.)Mr. Marcela Villanueav will ambulate with SUPERVISION for 400 feet . 4/26  4) pt able to go up & down 4 steps using a rail & SBA. Met 4/24    ________________________________________________________________________________________________      PHYSICAL THERAPY: Daily Note, Discharge and PM 4/26/2022  INPATIENT: PT Visit Days : 5  Payor: Cas Evangelista / Plan: [place] MEDICARE PART A AND B / Product Type: Medicare /       NAME/AGE/GENDER: Shivam Mckeon is a 80 y.o. male   PRIMARY DIAGNOSIS: SBO (small bowel obstruction) ([facility] [K56.609] SBO (small bowel obstruction) (HCC) SBO (small bowel obstruction) (HCC)  Procedure(s) (LRB):  LAPAROTOMY EXPLORATORY POSSIBLE BOWEL RESECTION (N/A)  5 Days Post-Op  ICD-10: Treatment Diagnosis:   · Generalized Muscle Weakness (M62.81)  · Other lack of cordination (R27.8)  · Difficulty in walking, Not elsewhere classified (R26.2)  · Other abnormalities of gait and mobility (R26.89)   Precaution/Allergies:  Patient has no known allergies. ASSESSMENT:     Mr. Marcela Villanueva continues to show steady gains with functional mobility & with tolerance to progressive activity. This pt is in good spirits  & very motivated to participate in progressive activity. This pt will needs to address balance instability that affects his overall safety with transfers & gait. Stability during [address] should improve more quickly once pt is able to receive some form of nutrition. 4/25 stand up in his home. Ambulated 650 ft pushing the IV pole without LOB,  Went up/down steps x 3. Then went to room and performs B UE in the chair with green thera-band.   KELSEY MÁRQUEZ exercise in stand with green thera-band and LOB. Rest few min then ambulated another 650 ft pushing IV pole. Return to chair with needs in reach, and instructed to call for assist, before getting up. 426 sitting in chair upon arrival.  All transfer and gait are independent. Ambulated 650 ft pushing IV pole with no LOB. Return to the chair. Pt has met 2,3,4, goals. Pt is walking the mejias with family 2-3 x or more. Therapy will D/C pt @ this time. Pt is agreeable. He has exercises and green thera-band in his room. This section established at most recent assessment   PROBLEM LIST (Impairments causing functional limitations):  1. Decreased Strength  2. Decreased ADL/Functional Activities  3. Decreased Transfer Abilities  4. Decreased Ambulation Ability/Technique  5. Decreased Balance  6. Increased Pain  7. Decreased Activity Tolerance  8. Decreased Flexibility/Joint Mobility  9. Decreased [place] with Home Exercise Program   INTERVENTIONS PLANNED: (Benefits and precautions of physical therapy have been discussed with the patient.)  1. Balance Exercise  2. Bed Mobility  3. Gait Training  4. Therapeutic Activites  5. Therapeutic Exercise/Strengthening  6. Transfer Training     TREATMENT PLAN: Frequency/Duration: daily for duration of hospital stay  Rehabilitation Potential For Stated Goals: Good     REHAB RECOMMENDATIONS (at time of discharge pending progress):    Placement: It is my opinion, based on this patient's performance to date, that Mr. Nico Winters may benefit from [address] after discharge due to the functional deficits listed above that are likely to improve with skilled rehabilitation because he/she has multiple medical issues that affect his/her functional mobility in the community.   Equipment:    None at this time              HISTORY:   History of Present Injury/Illness (Reason for Referral):  Yuko Staley is a 80 y.o. male with medical history of GERD s/p nissen fundoplication 3587, thyroid CA s/p resection, HTN, CAD who presented with epigastrict abdominal pain and RUQ pain that started around 4pm yesterday. Has not passed gas or had a BM since onset of symptoms. He had an outpatient CT today showing high grade SBO @ [facility] but wanted to be admitted at [facility] so he drove here. Labs significant for WBC 19K with neutrophile predominance. rec'd zosyn in ED. General surgery was notified and requested hospitalist admission. Patient in severe discomfort on my eval. Family at bedside. No PO intake > 24 hours. Past Medical History/Comorbidities:   Mr. Mica Angel  has a past medical history of Arthritis, Chronic coronary artery disease, Gastroesophageal reflux disease, Gout, Hypercholesterolemia, Hypertension, Malignant melanoma, Papillary thyroid carcinoma, multifocal (3 tumors in the left lobe, largest tumor 0.9 cm), status post thyroidectomy/central lymph node dissection 5/26/2015 and 54.5 mCi iodine-131 7/22/2015, and Postsurgical hypothyroidism. Mr. Mica Angel  has a past surgical history that includes hx coronary stent placement (2010); hx malignant skin lesion excision (2011); hx cholecystectomy (2005); hx knee arthroscopy (Right, 2005); hx tonsillectomy (Age 4); hx appendectomy (1969); hx thyroidectomy (5/26/2015); hx gi (2008); and hx coronary artery bypass graft (08/19/2016). Social History/Living Environment:   Home Environment: Apartment  # Steps to Enter: 4  Rails to Enter: Yes  [employer] : Left  One/Two Story Residence: One story  Living Alone: No  Support Systems: Spouse/Significant Other Murali Main [phone])  Patient Expects to be Discharged to[de-identified] Home with family assistance  Current DME Used/Available at Home: None  Prior Level of Function/Work/Activity:  Pt was independent without an assistive device  Personal Factors:           Other factors that influence how disability is experienced by the patient:  current  [address]   Number of Personal Factors/Comorbidities that affect the Plan of Care: 3+: HIGH COMPLEXITY   EXAMINATION:   Most Recent Physical Functioning:   Gross Assessment:                     Balance:  Sitting: Intact; Without support  Standing: Without support Bed Mobility:          Transfers:  Sit to Stand: Independent  Stand to Sit: Independent  Bed to Chair: Independent  Duration: 25 Minutes  Gait:     Distance (ft): 650 Feet (ft)  Assistive Device:  (push IV pole)           Functional Mobility:         Gait/Ambulation: sba        Transfers: sba        Bed Mobility:  NT   Body Structures Involved:  1. Digestive Structures  2. Muscles Body Functions Affected:  1. Movement Related  2. Digestive Activities and Participation Affected:  1. General Tasks and Demands  2. Mobility   Number of elements that affect the Plan of Care: 4+: HIGH COMPLEXITY   CLINICAL PRESENTATION:   Presentation: Stable and uncomplicated: LOW COMPLEXITY   CLINICAL DECISION MAKIN[address] [place] [id number] AM-PAC 6 Clicks   Basic Mobility Inpatient Short Form  How much difficulty does the patient currently have. .. Unable A Lot A Little None   1. Turning over in bed (including adjusting bedclothes, sheets and blankets)? [] 1   [] 2   [x] 3   [] 4   2. Sitting down on and standing up from a chair with arms ( e.g., wheelchair, bedside commode, etc.)   [] 1   [] 2   [x] 3   [] 4   3. Moving from lying on back to sitting on the side of the bed? [] 1   [] 2   [x] 3   [] 4   How much help from another person does the patient currently need. .. Total A Lot A Little None   4. Moving to and from a bed to a chair (including a wheelchair)? [] 1   [] 2   [x] 3   [] 4   5. Need to walk in hospital room? [] 1   [] 2   [x] 3   [] 4   6. Climbing 3-5 steps with a railing? [x] 1   [] 2   [] 3   [] 4   © , Trustees of [address] [place] [id number], under license to [employer].  All rights reserved      Score:  Initial: 16 Most Recent: X (Date: -- )    Interpretation of Tool: Represents activities that are increasingly more difficult (i.e. Bed mobility, Transfers, Gait). Medical Necessity:     · Patient is expected to demonstrate progress in   · strength, range of motion, balance, coordination, and functional technique  ·  to   · decrease assistance required with bed mobility, transfers & gait  · .  Reason for Services/Other Comments:  · Patient continues to require skilled intervention due to   · Pt not independent with functional mobility  · . Use of outcome tool(s) and clinical judgement create a POC that gives a: Clear prediction of patient's progress: LOW COMPLEXITY            TREATMENT:   (In addition to Assessment/Re-Assessment sessions the following treatments were rendered)   Pre-treatment Symptoms/Complaints: pt agreeable  Pain: Initial: numeric scale  Pain Intensity 1: 2  Post Session:         Therapeutic Activity: (  25 Minutes ):  Therapeutic activities walking to laps around the floor without LOB. Work on stand exercises with green thera-band. Gait Training ( ):  Gait training to improve and/or restore physical functioning as related to mobility.   Ambulated 650 Feet (ft) with   using a  (push IV pole) and minimal       Green Thera-band all exercises Date:  4/25 Date:   Date:     Activity/Exercise Parameters Parameters Parameters   Ankle pumps  20     Stand hip  abd/add  20     Stand  hip flex/ext 20     Stand knee flex/ext 20     Shoulder flex/ext 20     Shoulder abd/add 20     Elbow flex/ext 20                       Braces/Orthotics/Lines/Etc:   · IV  Treatment/Session Assessment:    · Response to Treatment: D/C  · Interdisciplinary Collaboration:   o Physical Therapy Assistant  o Registered Nurse  · After treatment position/precautions:   o Up in chair  o Bed/Chair-wheels locked  o Caregiver at bedside  o Call light within reach  o RN notified  o Family at bedside   · Compliance with Program/Exercises: Compliant all of the time  · Recommendations/Intent for next treatment session: \"Next visit will focus on reduction in assistance provided\".   Total Treatment Duration:  PT Patient Time In/Time Out  Time In: 1400  Time Out: [address] [facility] Navin Powers, DAVID

## 2022-04-26 NOTE — PROGRESS NOTES
Hospitalist Progress Note   Admit Date:  2022  4:44 PM   Name:  Skyler De Jesus. Age:  80 y.o. Sex:  male  :  1950   MRN:  [id number]   Room:  341/01    Presenting Complaint: Abdominal Pain and Abnormal Lab Results    Reason(s) for Admission: SBO (small bowel obstruction) [facility] Hospital Course & Interval History:   Skyler Law is a 80 y.o. male with medical history of GERD s/p nissen fundoplication 1162, thyroid CA s/p resection, HTN, CAD who presented with epigastrict abdominal pain and RUQ pain that started around 4pm yesterday. Has not passed gas or had a BM since onset of symptoms. He had an outpatient CT today showing high grade SBO @ [facility] but wanted to be admitted at [facility] so he drove here. Labs significant for WBC 19K with neutrophile predominance. rec'd zosyn in ED. General surgery was notified and requested hospitalist admission. Patient in severe discomfort on my eval. Family at bedside. No PO intake > 24 hours    He is s/p Nissen fundoplication performed in  by Dr. Barak Maloney at an outside hospital and reports he has been unable to vomit since then. He is s/p laparoscopic cholecystectomy  by Dr. Barak Maloney  He is s/p open appendectomy . He is s/p colonoscopy in 2019 and reports this was normal with his next planned in 5 years.     OSH CT a/p - Findings consistent with high-grade small bowel obstruction with transition point within the right upper quadrant. Admitted NPO, NGT, IVF, Zosyn with no improvement overnight and urgently transferred to OR. Now s/p expl laparotomy and reduction of internal hernia for high grade SBO on 22. Small bowel entrapped in the hernia was ischemic and injured but not resected and looked injury looked reversible but will take some time per , surgery.  - TPN initiated     Subjective/24hr Events (22):  POD5 no complaints.  No flatus or BM yet   ROS:  10 systems reviewed and negative except as noted above. Assessment & Plan:     High grade SBO with SIRS - refractory to bowel compression and rest. Now s/p expl laparotomy and reduction of internal hernia for high grade SBO on 4/21/22. Small bowel entrapped in the hernia was ischemic and injured but not resected and looked injury looked reversible but will take some time per , surgery. NGT to LIS. Zosyn. TPN. Monitor electrolytes and volume status. Acute liver injury - responded to medical therapy. LFTs normalized s/p surgery, abx and IVF. Trend CMP. Suspect 2/2 early sepsis from ischemic bowel. RUQ US showed normal size liver and heterogenous appearance and some contour nodularity suggested. No h/o alcohol abuse. H/o hepatoxic agent accicdentally taken double the dose years ago and was closely monitored for hepatic failure thereafter. Acute hepatitis panel negative. Lab Results   Component Value Date/Time    ALT (SGPT) 50 04/26/2022 04:37 AM    AST (SGOT) 26 04/26/2022 04:37 AM    Alk. phosphatase 77 04/26/2022 04:37 AM    Bilirubin, total 0.4 04/26/2022 04:37 AM       GINNY 2/2 ATN - improved s/p IVF. [facility] Records Now back to baseline Scr ~1.0. cont. IVF, supportive care. Lab Results   Component Value Date/Time    Creatinine 1.00 04/26/2022 04:37 AM     Hyperglycemia - 2/2 severe illness, fasting  on admission now improved. a1c 5.6%. cont SSI q6h while NPO     Postsurgical hypothyroidism - started IV synthroid on 4/24    HTN - hold home meds.      CAD - BB held due to bradycardia. Other meds held due to NPO state. Chronic coronary artery disease       Papillary thyroid carcinoma, multifocal (3 tumors in the left lobe, largest tumor 0.9 cm), status post thyroidectomy/central lymph node dissection 5/26/2015 and 54.5 mCi iodine-131     - follows with endocrinology outpatient.        GERD s/p buzz fundoplication - IV pepcid            Discharge Planning:      Not stable     Diet:  DIET NPO  DVT PPx: lovenox  Code status: Full Code    Hospital Problems as of 4/26/2022 Date Reviewed: 12/14/2021          Codes Class Noted - Resolved POA    Leukocytosis ICD-10-CM: D72.829  ICD-9-CM: 288.60  4/21/2022 - Present Yes        Generalized abdominal pain ICD-10-CM: R10.84  ICD-9-CM: 789.07  4/21/2022 - Present Yes        Transaminitis ICD-10-CM: R74.01  ICD-9-CM: 790.4  4/21/2022 - Present No        Acute kidney injury (GINNY) with acute tubular necrosis (ATN) ([facility] ICD-10-CM: N17.0  ICD-9-CM: 584.5  4/21/2022 - Present Yes        * (Principal) SBO (small bowel obstruction) ([facility] ICD-10-CM: R99.425  ICD-9-CM: 560.9  4/20/2022 - Present Yes    Overview Addendum 4/21/2022  1:49 PM by Vivi Raines MD     4/21/22 s/p expl lap and reduction of internal hernia for high grade SBO; Dr Lili Bartlett             Postsurgical hypothyroidism ICD-10-CM: E89.0  ICD-9-CM: 244.0  Unknown - Present Yes        Papillary thyroid carcinoma, multifocal (3 tumors in the left lobe, largest tumor 0.9 cm), status post thyroidectomy/central lymph node dissection 5/26/2015 and 54.5 mCi iodine-131 7/22/2015 ICD-10-CM: C73  ICD-9-CM: 620  6/21/2016 - Present Yes        GERD (gastroesophageal reflux disease) ICD-10-CM: K21.9  ICD-9-CM: 530.81  Unknown - Present Yes        Chronic coronary artery disease ICD-10-CM: I25.10  ICD-9-CM: 414.00  9/3/2015 - Present Yes    Overview Signed 6/21/2016  8:29 AM by Can JOHNS     Overview:   BMS RCA 2008; NORMAL STRESS TEST 2014    Last Assessment & Plan:   He does not have any anginal quality chest pain. He occasionally has sharp atypical pain. His stress test was normal less than a year ago. I don't plan any further investigation.                    Objective:     Patient Vitals for the past 24 hrs:   Temp Pulse Resp BP SpO2   04/26/22 0712 97.7 °F (36.5 °C) (!) 59 20 (!) 153/74 98 %   04/26/22 0404 98.3 °F (36.8 °C) (!) 51 18 (!) 164/79 94 %   04/26/22 0011 98.6 °F (37 °C) (!) 54 18 (!) 164/74 96 %   04/25/22 1925 98.3 °F (36.8 °C) (!) 53 18 (!) 155/66 97 %   04/25/22 1642 97.4 °F (36.3 °C) (!) 51 16 (!) 149/72 98 %     Oxygen Therapy  O2 Sat (%): 98 % (04/26/22 0712)  O2 Device: None (Room air) (04/26/22 0750)  O2 Flow Rate (L/min): 10 l/min (04/21/22 1416)    Estimated body mass index is 27.06 kg/m² as calculated from the following:    Height as of this encounter: 5' 8\" (1.727 m). Weight as of this encounter: 80.7 kg (178 lb). Intake/Output Summary (Last 24 hours) at 4/26/2022 1107  Last data filed at 4/26/2022 0551  Gross per 24 hour   Intake 1700 ml   Output 550 ml   Net 1150 ml         Physical Exam:     Blood pressure (!) 153/74, pulse (!) 59, temperature 97.7 °F (36.5 °C), resp. rate 20, height 5' 8\" (1.727 m), weight 80.7 kg (178 lb), SpO2 98 %. General:    Less Ill appearing. Resting  In bed   Head:  Normocephalic, atraumatic  Eyes:  Sclerae appear normal.  Pupils equally round. ENT:  NGT in place. Neck:  No restricted ROM. Trachea midline   CV:   RRR. No m/r/g. No jugular venous distension. Lungs:   CTAB. No wheezing, rhonchi, or rales. Respirations even, unlabored  Abdomen: Bowel sounds present. Soft. Non Distended. Mild tender  Extremities: No cyanosis or clubbing. No edema  Skin:     No rashes and normal coloration. Warm and dry. Neuro:  CN II-XII grossly intact. Sensation intact. A&Ox3  Psych:  Normal mood and affect.       I have reviewed ordered lab tests and independently visualized imaging below:    Recent Labs:  Recent Results (from the past 48 hour(s))   GLUCOSE, POC    Collection Time: 04/24/22 12:02 PM   Result Value Ref Range    Glucose (POC) 103 (H) 65 - 100 mg/dL    Performed by Sherwin    GLUCOSE, POC    Collection Time: 04/24/22  6:13 PM   Result Value Ref Range    Glucose (POC) 79 65 - 100 mg/dL    Performed by Sherwin    GLUCOSE, POC    Collection Time: 04/25/22 12:44 AM   Result Value Ref Range    Glucose (POC) 141 (H) 65 - 100 mg/dL Performed by Gerardo    CBC W/O DIFF    Collection Time: 04/25/22  6:06 AM   Result Value Ref Range    WBC 6.7 4.3 - 11.1 K/uL    RBC 4.18 (L) 4.23 - 5.6 M/uL    HGB 13.6 13.6 - 17.2 g/dL    HCT 38.8 (L) 41.1 - 50.3 %    MCV 92.8 79.6 - 97.8 FL    MCH 32.5 26.1 - 32.9 PG    MCHC 35.1 (H) 31.4 - 35.0 g/dL    RDW 13.2 11.9 - 14.6 %    PLATELET 454 118 - 562 K/uL    MPV 8.6 (L) 9.4 - 12.3 FL    ABSOLUTE NRBC 0.00 0.0 - 0.2 K/uL   METABOLIC PANEL, COMPREHENSIVE    Collection Time: 04/25/22  6:06 AM   Result Value Ref Range    Sodium 140 136 - 145 mmol/L    Potassium 3.9 3.5 - 5.1 mmol/L    Chloride 108 (H) 98 - 107 mmol/L    CO2 26 21 - 32 mmol/L    Anion gap 6 (L) 7 - 16 mmol/L    Glucose 103 (H) 65 - 100 mg/dL    BUN 15 8 - 23 MG/DL    Creatinine 1.06 0.8 - 1.5 MG/DL    GFR est AA >60 >60 ml/min/1.73m2    GFR est non-AA >60 >60 ml/min/1.73m2    Calcium 8.8 8.3 - 10.4 MG/DL    Bilirubin, total 0.5 0.2 - 1.1 MG/DL    ALT (SGPT) 50 12 - 65 U/L    AST (SGOT) 20 15 - 37 U/L    Alk.  phosphatase 71 50 - 136 U/L    Protein, total 6.2 (L) 6.3 - 8.2 g/dL    Albumin 2.7 (L) 3.2 - 4.6 g/dL    Globulin 3.5 2.3 - 3.5 g/dL    A-G Ratio 0.8 (L) 1.2 - 3.5     PHOSPHORUS    Collection Time: 04/25/22  6:06 AM   Result Value Ref Range    Phosphorus 4.0 (H) 2.3 - 3.7 MG/DL   MAGNESIUM    Collection Time: 04/25/22  6:06 AM   Result Value Ref Range    Magnesium 2.2 1.8 - 2.4 mg/dL   TRIGLYCERIDE    Collection Time: 04/25/22  6:06 AM   Result Value Ref Range    Triglyceride 115 35 - 150 MG/DL   GLUCOSE, POC    Collection Time: 04/25/22  6:31 AM   Result Value Ref Range    Glucose (POC) 110 (H) 65 - 100 mg/dL    Performed by Kelsi    CBC W/O DIFF    Collection Time: 04/26/22  4:37 AM   Result Value Ref Range    WBC 7.1 4.3 - 11.1 K/uL    RBC 3.83 (L) 4.23 - 5.6 M/uL    HGB 12.4 (L) 13.6 - 17.2 g/dL    HCT 35.8 (L) 41.1 - 50.3 %    MCV 93.5 79.6 - 97.8 FL    MCH 32.4 26.1 - 32.9 PG    MCHC 34.6 31.4 - 35.0 g/dL    RDW 13.1 11.9 - 14.6 %    PLATELET 235 642 - 200 K/uL    MPV 8.6 (L) 9.4 - 12.3 FL    ABSOLUTE NRBC 0.00 0.0 - 0.2 K/uL   METABOLIC PANEL, COMPREHENSIVE    Collection Time: 04/26/22  4:37 AM   Result Value Ref Range    Sodium 141 136 - 145 mmol/L    Potassium 3.7 3.5 - 5.1 mmol/L    Chloride 110 (H) 98 - 107 mmol/L    CO2 25 21 - 32 mmol/L    Anion gap 6 (L) 7 - 16 mmol/L    Glucose 104 (H) 65 - 100 mg/dL    BUN 19 8 - 23 MG/DL    Creatinine 1.00 0.8 - 1.5 MG/DL    GFR est AA >60 >60 ml/min/1.73m2    GFR est non-AA >60 >60 ml/min/1.73m2    Calcium 8.6 8.3 - 10.4 MG/DL    Bilirubin, total 0.4 0.2 - 1.1 MG/DL    ALT (SGPT) 50 12 - 65 U/L    AST (SGOT) 26 15 - 37 U/L    Alk. phosphatase 77 50 - 136 U/L    Protein, total 5.7 (L) 6.3 - 8.2 g/dL    Albumin 2.6 (L) 3.2 - 4.6 g/dL    Globulin 3.1 2.3 - 3.5 g/dL    A-G Ratio 0.8 (L) 1.2 - 3.5         All Micro Results     Procedure Component Value Units Date/Time    CULTURE, BLOOD [[id number]] Collected: 04/20/22 1759    Order Status: Completed Specimen: Blood Updated: 04/25/22 1228     Special Requests: --        NO SPECIAL REQUESTS  LEFT  Antecubital       Culture result: NO GROWTH 5 DAYS       CULTURE, BLOOD [[id number]] Collected: 04/20/22 1907    Order Status: Completed Specimen: Blood Updated: 04/25/22 1228     Special Requests: --        RIGHT  FOREARM       Culture result: NO GROWTH 5 DAYS             Other Studies:  No results found.     Current Meds:  Current Facility-Administered Medications   Medication Dose Route Frequency    labetaloL (NORMODYNE;TRANDATE) injection 20 mg  20 mg IntraVENous Q4H PRN    TPN ADULT - dextrose 5% amino acid 4.25%   IntraVENous QPM    fat emulsion 20% (LIPOSYN, INTRAlipid) infusion 250 mL  250 mL IntraVENous QPM    levothyroxine (SYNTHROID) injection 100 mcg  100 mcg IntraVENous Q24H    carboxymethylcellulose sodium (REFRESH LIQUIGEL) 1 % ophthalmic solution 1 Drop  1 Drop Both Eyes PRN    NUTRITIONAL SUPPORT ELECTROLYTE PRN ORDERS   Does Not Apply PRN    HYDROmorphone (DILAUDID) injection 0.5 mg  0.5 mg IntraVENous Q3H PRN    Or    HYDROmorphone (DILAUDID) injection 1 mg  1 mg IntraVENous Q3H PRN    phenol throat spray (CHLORASEPTIC) 1 Spray  1 Spray Oral PRN    sodium chloride (NS) flush 5-40 mL  5-40 mL IntraVENous Q8H    sodium chloride (NS) flush 5-40 mL  5-40 mL IntraVENous PRN    acetaminophen (TYLENOL) tablet 650 mg  650 mg Oral Q6H PRN    ondansetron (ZOFRAN) injection 4 mg  4 mg IntraVENous Q6H PRN    enoxaparin (LOVENOX) injection 40 mg  40 mg SubCUTAneous DAILY    piperacillin-tazobactam (ZOSYN) 3.375 g in 0.9% sodium chloride (MBP/ADV) 100 mL MBP  3.375 g IntraVENous Q8H    glucose chewable tablet 16 g  16 g Oral PRN    glucagon (GLUCAGEN) injection 1 mg  1 mg IntraMUSCular PRN    dextrose 10% infusion 125-250 mL  125-250 mL IntraVENous PRN    famotidine (PF) (PEPCID) 20 mg in 0.9% sodium chloride 10 mL injection  20 mg IntraVENous DAILY    naloxone (NARCAN) injection 0.2 mg  0.2 mg IntraVENous EVERY 2 MINUTES AS NEEDED       Signed:  Alma Weinberg DO    Part of this note may have been written by using a voice dictation software. The note has been proof read but may still contain some grammatical/other typographical errors.

## 2022-04-26 NOTE — PROGRESS NOTES
H&P/Consult Note/Progress Note/Office Note:   Shivam Mckeon MRN: [id number]  [facility]:0/56/8838  Age:71 y.o.    HPI: Shivam Mckeon is a 80 y.o. male who is s/p expl laparotomy and reduction of internal hernia for high grade SBO on 4/21/22. Prior to surgery he was admitted by the hospitalist after he came to the ER on 4/20/22 with a 2-day history of diffuse, constant, progressive, severe abdominal pain. Nothing in particular made his pain better or worse. He had associated nausea but no vomiting  No recent abdominal trauma reported  He is s/p Nissen fundoplication performed in 2006 by Dr. Nevin Talavera at an outside hospital and reports he has been unable to vomit since then. He is s/p laparoscopic cholecystectomy 2006 by Dr. Nevin Talavera  He is s/p open appendectomy 1969. He is s/p colonoscopy in 2019 and reports this was normal with his next planned in 5 years. He had leukocytosis in the ER and a CT scan as shown below with a high-grade small bowel obstruction. An NG tube was placed to decompression and he was given IV fluids and bowel rest without improvement in his abdominal pain  Surgery was consulted by Dr Yeison Vasquez and Dr Sonia Cobian      4/20/22 CT abd/pelvis with oral and IV contrast  LOWER CHEST: Unremarkable. ABDOMEN AND PELVIS:   Liver: Unremarkable. Biliary system: Cholecystectomy.     Pancreas: Unremarkable. Spleen: Unremarkable. Adrenals: Unremarkable.     Genitourinary: Symmetric renal enhancement. No hydronephrosis or ureteral calculus. Urinary bladder decompressed. Reproductive organs: Prostate gland is enlarged. Gastrointestinal tract: Multiple dilated loops of small bowel present throughout the abdomen. There is abrupt transition to decompressed bowel involving the proximal ileum within the right lower quadrant (3, 62). Peritoneum/Extraperitoneum: Unremarkable. Small amount of abdominopelvic ascites.      Vascular: Unremarkable   Musculoskeletal:  Degenerative changes of thoracolumbar spine. No acute or aggressive osseous lesion. IMPRESSION:   Findings consistent with high-grade small bowel obstruction with transition point within the right upper quadrant. 4/21/22 abd US  Hx: Elevated LFTs      Findings: The liver is normal in size measuring 14.7 cm. No intrahepatic biliary ductal dilitation is seen. The liver appears heterogeneous and some contour nodularity is suggested. These can suggest cirrhosis. The main portal vein is patent and demonstrates hepatopedal flow.     The gallbladder has been removed. The common bile duct is normal in caliber measuring 3.2 mm.     The pancreas is not seen due to overlying bowel gas and therefore cannot be assessed.     The right kidney measures 10.4 cm in length. No shadowing stones, or hydronephrosis is seen of the right kidney. Only increased parenchymal echogenicity is seen suggested chronic medical renal changes.     The IVC is patent. The distal abdominal aorta is normal in caliber measuring 1.4cm. The non-visualized portions of the aorta cannot be evaluated. Small upper abdominal ascites is seen.     IMPRESSION  1. Heterogeneous liver with some questionable contour nodularity. These can be indicated to cirrhosis. 2.  No intra- or extrahepatic biliary ductal dilitation. 3.  Patent portal vein and IVC. Flow in the portal vein is hepatopedal in direction.    4. Small upper abdominal ascites.          additionl hx:  4/22/22 POD1 AF/VSS NGT; Follow LFTs  4/23/22 POD2 no new complaints; no flatus, wbc normal; Hepatitis panel negative  4/24/22 POD3 AF, normal WBC; repeat LFTs normal Toradol added for pain  4/25/22 POD4 elevated BP, still with sme abd pain, no flatus; AF, normal WBC  4/26/22 POD5 AF/VSS, WBC normal; no flatus yet                  Past Medical History:   Diagnosis Date    Arthritis     Chronic coronary artery disease     Gastroesophageal reflux disease     Gout     Hypercholesterolemia     Hypertension     Malignant melanoma     Papillary thyroid carcinoma, multifocal (3 tumors in the left lobe, largest tumor 0.9 cm), status post thyroidectomy/central lymph node dissection 5/26/2015 and 54.5 mCi iodine-131 7/22/2015     Postsurgical hypothyroidism      Past Surgical History:   Procedure Laterality Date    HX APPENDECTOMY  1969    HX CHOLECYSTECTOMY  2005    HX CORONARY ARTERY BYPASS GRAFT  08/19/2016    Dr[place] [address] [facility]  2010    HX GI  2008    Nissen fundoplication    HX KNEE ARTHROSCOPY Right 2005    HX MALIGNANT SKIN LESION EXCISION  2011    melanoma removed from ear lobe    HX THYROIDECTOMY  5/26/2015    including central lymph node dissection    HX TONSILLECTOMY  Age 4     Current Facility-Administered Medications   Medication Dose Route Frequency    labetaloL (NORMODYNE;TRANDATE) injection 20 mg  20 mg IntraVENous Q4H PRN    TPN ADULT - dextrose 5% amino acid 4.25%   IntraVENous QPM    fat emulsion 20% (LIPOSYN, INTRAlipid) infusion 250 mL  250 mL IntraVENous QPM    levothyroxine (SYNTHROID) injection 100 mcg  100 mcg IntraVENous Q24H    carboxymethylcellulose sodium (REFRESH LIQUIGEL) 1 % ophthalmic solution 1 Drop  1 Drop Both Eyes PRN    NUTRITIONAL SUPPORT ELECTROLYTE PRN ORDERS   Does Not Apply PRN    HYDROmorphone (DILAUDID) injection 0.5 mg  0.5 mg IntraVENous Q3H PRN    Or    HYDROmorphone (DILAUDID) injection 1 mg  1 mg IntraVENous Q3H PRN    phenol throat spray (CHLORASEPTIC) 1 Spray  1 Spray Oral PRN    sodium chloride (NS) flush 5-40 mL  5-40 mL IntraVENous Q8H    sodium chloride (NS) flush 5-40 mL  5-40 mL IntraVENous PRN    acetaminophen (TYLENOL) tablet 650 mg  650 mg Oral Q6H PRN    Or    acetaminophen (TYLENOL) suppository 650 mg  650 mg Rectal Q6H PRN    ondansetron (ZOFRAN) injection 4 mg  4 mg IntraVENous Q6H PRN    enoxaparin (LOVENOX) injection 40 mg  40 mg SubCUTAneous DAILY    piperacillin-tazobactam (ZOSYN) 3.375 g in 0.9% sodium chloride (MBP/ADV) 100 mL MBP  3.375 g IntraVENous Q8H    glucose chewable tablet 16 g  16 g Oral PRN    glucagon (GLUCAGEN) injection 1 mg  1 mg IntraMUSCular PRN    dextrose 10% infusion 125-250 mL  125-250 mL IntraVENous PRN    famotidine (PF) (PEPCID) 20 mg in 0.9% sodium chloride 10 mL injection  20 mg IntraVENous DAILY    naloxone (NARCAN) injection 0.2 mg  0.2 mg IntraVENous EVERY 2 MINUTES AS NEEDED     Patient has no known allergies. Social History     Socioeconomic History    Marital status:    Tobacco Use    Smoking status: Never Smoker    Smokeless tobacco: Never Used   Substance and Sexual Activity    Alcohol use: No    Drug use: No     Social History     Tobacco Use   Smoking Status Never Smoker   Smokeless Tobacco Never Used     Family History   Problem Relation Age of Onset    Cancer Mother         Lung    Arthritis-rheumatoid Father     Coronary Art Dis Father     Cancer Brother         Melanoma    Thyroid Cancer Neg Hx      ROS: The patient has no difficulty with chest pain or shortness of breath. No fever or chills. Comprehensive review of systems was otherwise unremarkable except as noted above.     Physical Exam:   Visit Vitals  BP (!) 164/79 (BP 1 Location: Left upper arm, BP Patient Position: Supine)   Pulse (!) 51   Temp 98.3 °F (36.8 °C)   Resp 18   Ht 5' 8\" (1.727 m)   Wt 178 lb (80.7 kg)   SpO2 94%   BMI 27.06 kg/m²     Vitals:    04/25/22 1642 04/25/22 1925 04/26/22 0011 04/26/22 0404   BP: (!) 149/72 (!) 155/66 (!) 164/74 (!) 164/79   Pulse: (!) 51 (!) 53 (!) 54 (!) 51   Resp: 16 18 18 18   Temp: 97.4 °F (36.3 °C) 98.3 °F (36.8 °C) 98.6 °F (37 °C) 98.3 °F (36.8 °C)   SpO2: 98% 97% 96% 94%   Weight:       Height:         04/25 1901 - 04/26 0700  In: 1700 [I.V.:1700]  Out: 550   04/24 0701 - 04/25 1900  In: -   Out: 550     Constitutional: Alert, oriented, cooperative patient in no acute distress; appears stated age    Eyes:Sclera are clear. EOMs intact  ENMT: no external lesions gross hearing normal; no obvious neck masses, no ear or lip lesions, nares normal, +NGT with bilious output  CV: RRR. Normal perfusion  Resp: No JVD. Breathing is  non-labored; no audible wheezing. GI: dressing dry and intact; Inc OK; abd flat;  Some duke-incisional tenderness        Musculoskeletal: unremarkable with normal function. No embolic signs or cyanosis. Neuro:  Oriented; moves all 4; no focal deficits  Psychiatric: normal affect and mood, no memory impairment    Recent vitals (if inpt):  Patient Vitals for the past 24 hrs:   BP Temp Pulse Resp SpO2   04/26/22 0404 (!) 164/79 98.3 °F (36.8 °C) (!) 51 18 94 %   04/26/22 0011 (!) 164/74 98.6 °F (37 °C) (!) 54 18 96 %   04/25/22 1925 (!) 155/66 98.3 °F (36.8 °C) (!) 53 18 97 %   04/25/22 1642 (!) 149/72 97.4 °F (36.3 °C) (!) 51 16 98 %   04/25/22 1104 (!) 156/69 97.3 °F (36.3 °C) (!) 57 16 99 %   04/25/22 0738 (!) 176/76 98 °F (36.7 °C) (!) 57 16 97 %       Amount and/or Complexity of Data Reviewed and Analyzed:  I reviewed and analyzed all of the unique labs and radiologic studies that are shown below as well as any that are in the HPI, and any that are in the expanded problem list below  *Each unique test, order, or document contributes to the combination of 2 or combination of 3 in Category 1 below. For this visit I also reviewed old records and prior notes.       Recent Labs     04/26/22  0437   WBC 7.1   HGB 12.4*         K 3.7   *   CO2 25   BUN 19   CREA 1.00   *   TBILI 0.4   ALT 50   AP 77     Review of most recent CBC  Lab Results   Component Value Date/Time    WBC 7.1 04/26/2022 04:37 AM    HGB 12.4 (L) 04/26/2022 04:37 AM    HCT 35.8 (L) 04/26/2022 04:37 AM    PLATELET 277 59/34/6220 04:37 AM    MCV 93.5 04/26/2022 04:37 AM       Review of most recent BMP  Lab Results   Component Value Date/Time    Sodium 141 04/26/2022 04:37 AM    Potassium 3.7 2022 04:37 AM    Chloride 110 (H) 2022 04:37 AM    CO2 25 2022 04:37 AM    Anion gap 6 (L) 2022 04:37 AM    Glucose 104 (H) 2022 04:37 AM    BUN 19 2022 04:37 AM    Creatinine 1.00 2022 04:37 AM    GFR est AA >60 2022 04:37 AM    GFR est non-AA >60 2022 04:37 AM    Calcium 8.6 2022 04:37 AM       Review of most recent LFTs (and lipase if done)  Lab Results   Component Value Date/Time    ALT (SGPT) 50 2022 04:37 AM    AST (SGOT) 26 2022 04:37 AM    Alk. phosphatase 77 2022 04:37 AM    Bilirubin, total 0.4 2022 04:37 AM     Lab Results   Component Value Date/Time    Lipase 71 (L) 2022 05:08 PM       Lab Results   Component Value Date/Time    INR 1.0 2022 06:08 AM    Ammonia, plasma 31 2022 06:08 AM       Review of most recent HgbA1c  Lab Results   Component Value Date/Time    Hemoglobin A1c 5.6 2022 05:08 PM       Nutritional assessment screen for wound healing issues:  Lab Results   Component Value Date/Time    Protein, total 5.7 (L) 2022 04:37 AM    Albumin 2.6 (L) 2022 04:37 AM       @lastcovr@  XR Results (most recent):  Results from Hospital Encounter encounter on 22    XR ABD (KUB)    Narrative  KUB    INDICATION: Nasogastric tube placement    A supine view of the abdomen was obtained. The tip of the nasogastric tube is  in the stomach. There is stable small bowel distention suggesting a small bowel  obstruction    Impression  1. Tip of the NG tube is in the stomach.   2.  Dilated small bowel concerning for small bowel obstruction      CT Results (most recent):  Results from Hospital Encounter encounter on 08    [address] [place] [address] [place]  [address] [facility], [address] [facility]    NAME: Yaw Garibay  PT : 1950               SEX: M         MR#: [id number]  LOCATION/NS: -                 AGE: 63Y      ACCT: [id number]  ORDERING: MERLENE LOVE            PT TYPE: Karin Mendiola  RADIOLOGIST: Bhakti Antunez ([id number])  Final Report      ICD Codes / Adm. Diagnosis:                  /  Examination:  CT CARDIAC OVER READ  - [id number] - 2008  9:37AM      CT CHEST, CARDIAC CT OVER READ, 2008    Reason:    REPORT:    TECHNIQUE: Helically acquired images were obtained from just above the  aortic architectural to the lower hemithoraces reconstructed at 2.5 mm  intervals after intravenous contrast.  No prior studies are available for  comparison. FINDINGS: There is no pericardial effusion. No pleural fluid is identified  in the visualized portions of the thorax. Incidental note is made of the  origin of the left vertebral artery from the aortic arch. The heart and  great vessels are otherwise unremarkable. There are coronary artery  calcifications will be described in dedicated coronary artery CTA report. The visualized portions of the lungs are clear with the exception of a tiny  granuloma within the right lower lobe measuring 2 mm. No adenopathy is seen within the thorax. IMPRESSION:  1. TINY RIGHT LOWER LOBE GRANULOMA. 2. CORONARY ARTERY CALCIFICATIONS. Interpreting/Reading Doctor: Bhakti Antunez ([id number])  Transcribed: AGS on 2008  Approved: Bhakti Antunez ([id number])  2008        Distribution:  Attending Doctor: Tricia Arellano Results (most recent):  Results from [place] encounter on 08    [address]  [facility]    ULTRASOUND    NAME: Essie Shukla  PT : 1950               SEX: M         MR#: [id number]  LOCATION/NS: US-                 AGE: 78A      ACCT: [de-identified]  ORDERING: MERLENE LOVE            PT TYPE: Karin Maury  RADIOLOGISTReed Brewer ([id number])  Final Report      ICD Codes / Adm. Diagnosis:                  /  Examination:  CAROTID ULTRASOUND  - [id number] - 2008 9:36AM      CAROTID ULTRASOUND, 07/23/08:    Reason:   Coronary artery disease, hypertension. 272.4. REPORT:  Right and left common, internal and external carotid artery peak  systolic velocities were all within normal limits and normal ratios were  demonstrated. Right carotid bifurcation demonstrates very mild soft plaque  within the proximal internal carotid artery without any significant  narrowing. Doppler waveform tracings are normal in the right common,  internal carotid arteries, as well as vertebral artery which demonstrate  antegrade flow. On the left, there is no suspicious atheromatous disease and normal Doppler  tracings are evident. Left vertebral artery demonstrates antegrade flow. IMPRESSION:    EXTREMELY MINIMAL ATHEROMATOUS DISEASE AT THE RIGHT CAROTID  BIFURCATION WITHOUT NARROWING.  OTHERWISE, NORMAL.     Interpreting/Reading Doctor: William Patterson ([id number])  Transcribed: CP on 07/23/2008  Approved: William Patterson ([id number])  07/23/2008        Distribution:  Attending Doctor: Daniel Ochoa        Admission date (for inpatients): 4/20/2022   * No surgery found *  Procedure(s) with comments:  LAPAROTOMY EXPLORATORY POSSIBLE BOWEL RESECTION - REDUCTION OF INTERNAL HERNIA        ASSESSMENT/PLAN:  Problem List  Date Reviewed: 12/14/2021          Codes Class Noted    Leukocytosis ICD-10-CM: D72.829  ICD-9-CM: 288.60  4/21/2022        Generalized abdominal pain ICD-10-CM: R10.84  ICD-9-CM: 789.07  4/21/2022        Transaminitis ICD-10-CM: R74.01  ICD-9-CM: 790.4  4/21/2022        Acute kidney injury (GINNY) with acute tubular necrosis (ATN) ([facility] ICD-10-CM: N17.0  ICD-9-CM: 584.5  4/21/2022        * (Principal) SBO (small bowel obstruction) ([facility] ICD-10-CM: O10.896  ICD-9-CM: 560.9  4/20/2022    Overview Addendum 4/21/2022  1:49 PM by Antonio Nice MD     4/21/22 s/p expl lap and reduction of internal hernia for high grade SBO; Dr Jose C Mina             Postsurgical hypothyroidism ICD-10-CM: E89.0  ICD-9-CM: 244.0  Unknown        Papillary thyroid carcinoma, multifocal (3 tumors in the left lobe, largest tumor 0.9 cm), status post thyroidectomy/central lymph node dissection 5/26/2015 and 54.5 mCi iodine-131 7/22/2015 ICD-10-CM: C73  ICD-9-CM: 167  6/21/2016        GERD (gastroesophageal reflux disease) ICD-10-CM: K21.9  ICD-9-CM: 530.81  Unknown        Malignant melanoma ([facility] ICD-10-CM: C43.9  ICD-9-CM: 172.9  Unknown        Arthritis ICD-10-CM: M19.90  ICD-9-CM: 716.90  Unknown        Chronic coronary artery disease ICD-10-CM: I25.10  ICD-9-CM: 414.00  9/3/2015    Overview Signed 6/21/2016  8:29 AM by Tashi JOHNS     Overview:   BMS RCA 2008; NORMAL STRESS TEST 2014    Last Assessment & Plan:   He does not have any anginal quality chest pain. He occasionally has sharp atypical pain. His stress test was normal less than a year ago. I don't plan any further investigation. Dyslipidemia ICD-10-CM: E78.5  ICD-9-CM: 272.4  9/3/2015    Overview Signed 6/21/2016  8:29 AM by Tashi JOHNS     Overview:   STATIN INTOLERANT    Last Assessment & Plan:   He is statin intolerant but he is unable to tolerate red yeast rice and his total cholesterol is actually less than 200. We discussed the injectables but I don't think he is an appropriate candidate for that right now. He is doing pretty well just on Red yeast rice             Hypertension ICD-10-CM: I10  ICD-9-CM: 401.9  9/3/2015    Overview Signed 6/21/2016  8:29 AM by Dominga Miller. Paul Condon 58:   Blood pressure is adequately controlled.              Dysphonia ICD-10-CM: R49.0  ICD-9-CM: 784.42  8/28/2015            Principal Problem:    SBO (small bowel obstruction) ([facility] (4/20/2022)      Overview: 4/21/22 s/p expl lap and reduction of internal hernia for high grade SBO;       Dr Arana Little    Active Problems:    Chronic coronary artery disease (9/3/2015)      Overview: Overview:       BMS RCA 2008; NORMAL STRESS TEST 2014 Last Assessment & Plan:       He does not have any anginal quality chest pain. He occasionally has sharp       atypical pain. His stress test was normal less than a year ago. I don't       plan any further investigation. Papillary thyroid carcinoma, multifocal (3 tumors in the left lobe, largest tumor 0.9 cm), status post thyroidectomy/central lymph node dissection 5/26/2015 and 54.5 mCi iodine-131 7/22/2015 (6/21/2016)      GERD (gastroesophageal reflux disease) ()      Postsurgical hypothyroidism ()      Leukocytosis (4/21/2022)      Generalized abdominal pain (4/21/2022)      Transaminitis (4/21/2022)      Acute kidney injury (GINNY) with acute tubular necrosis (ATN) ([facility] (4/21/2022)           Number and Complexity of Problems addressed and   Risks of complications and/or morbidity of management          High-grade SBO with leukocytosis refractory to NGT decompression  He is s/p expl laparotomy and reduction of internal hernia for high grade SBO on 4/21/22. NPO/NGT/IVF  AF  WBC normal;  Leukocytosis resolved as of 4/22/22 (POD1)  Await flatus  OOB    LFTs suddenly bumped the morning of surgery  Subsequent LFTs were normal              Level of MDM (2/3 elements below)  Number and Complexity of Problems Addressed Amount and/or Complexity of Data to be Reviewed and Analyzed  *Each unique test, order, or document contributes to the combination of 2 or combination of 3 in Category 1 below.  Risk of Complications and/or Morbidity or Mortality of pt Management     17568  39725 SF Minimal  1self-limited or minor problem Minimal or none Minimal risk of morbidity from additional diagnostic testing or Rx   35197  17017 Low Low  2or more self-limited or minor problems;    or  1stable chronic illness;    or  5NEVPC, uncomplicated illness or injury   Limited  (Must meet the requirements of at least 1 of the 2 categories)  Category 1: Tests and documents   Any combination of 2 from the following:  Review of prior external note(s) from each unique source*;  review of the result(s) of each unique test*;   ordering of each unique test*    or   Category 2: Assessment requiring an independent historian(s)  (For the categories of independent interpretation of tests and discussion of management or test interpretation, see moderate or high) Low risk of morbidity from additional diagnostic testing or treatment     02934  08847 Mod Moderate  1or more chronic illnesses with exacerbation, progression, or side effects of treatment;    or  2or more stable chronic illnesses;    or  1undiagnosed new problem with uncertain prognosis;    or  1acute illness with systemic symptoms;    or  6GHTCF complicated injury   Moderate  (Must meet the requirements of at least 1 out of 3 categories)  Category 1: Tests, documents, or independent historian(s)  Any combination of 3 from the following:   Review of prior external note(s) from each unique source*;  Review of the result(s) of each unique test*;  Ordering of each unique test*;  Assessment requiring an independent historian(s)    or  Category 2: Independent interpretation of tests   Independent interpretation of a test performed by another physician/other qualified health care professional (not separately reported);     or  Category 3: Discussion of management or test interpretation  Discussion of management or test interpretation with external physician/other qualified health care professional/appropriate source (not separately reported)   Moderate risk of morbidity from additional diagnostic testing or treatment  Examples only:  Prescription drug management   Decision regarding minor surgery with identified patient or procedure risk factors  Decision regarding elective major surgery without identified patient or procedure risk factors   Diagnosis or treatment significantly limited by social determinants of health       55999 93869 High High  1or more chronic illnesses with severe exacerbation, progression, or side effects of treatment;    or  1 acute or chronic illness or injury that poses a threat to life or bodily function   Extensive  (Must meet the requirements of at least 2 out of 3 categories)  Category 1: Tests, documents, or independent historian(s)  Any combination of 3 from the following:   Review of prior external note(s) from each unique source*;  Review of the result(s) of each unique test*;   Ordering of each unique test*;   Assessment requiring an independent historian(s)    or   Category 2: Independent interpretation of tests   Independent interpretation of a test performed by another physician/other qualified health care professional (not separately reported);     or  Category 3: Discussion of management or test interpretation  Discussion of management or test interpretation with external physician/other qualified health care professional/appropriate source (not separately reported)   High risk of morbidity from additional diagnostic testing or treatment  Examples only:  Drug therapy requiring intensive monitoring for toxicity  Decision regarding elective major surgery with identified patient or procedure risk factors  Decision regarding emergency major surgery  Decision regarding hospitalization  Decision not to resuscitate or to de-escalate care because of poor prognosis             I have personally performed a face-to-face diagnostic evaluation and management  service on this patient. I have independently seen the patient. I have independently obtained the above history from the patient/family. I have independently examined the patient with above findings. I have independently reviewed data/labs for this patient and developed the above plan of care (MDM). Signed: Erickson Ramos.  Yadira Pham MD, FACS

## 2022-04-26 NOTE — PROGRESS NOTES
Comprehensive Nutrition Assessment    Type and Reason for Visit: Reassess  TPN Management (Hospitalist)    Nutrition Recommendations/Plan:   Parenteral Nutrition:  Peripheral parenteral nutrition new bag to begin at 1800  Continue: Dex 5%, 4.25% AA 2.4 L (100ml/hr)   Continue 250 ml 20% lipids daily  To provide: 1316 kcal/d (80% of needs), 102 grams of protein/d (100% of needs), 408 grams of CHO/d and 2650 ml of total volume/d. Lytes/L:   Sodium 50 meq (50 meq NaCl), Potassium 30 meq (20 meq KCL,10 meq KPO4), 5 meq Mg,  Calcium-none. Osmolarity 827  Other additives: MTE, MVI MWF due to national shortages,  100 mg Thiamine(day 2 of 4)  Nutritional Supplement Therapy:   Active electrolyte replacement per nutrition support protocols  Replacement indicated:  None  Labs:   BMP daily  Mg MWF  Phos MWF    POC Glucoses/SSI Not indicated     Malnutrition Assessment:  Malnutrition Status: At risk for malnutrition (specify) (NPO X 6 days)    Nutrition Assessment:   Nutrition History:    4/24: Called and spoke with wife as nutrition assessment was completed remotely from [facility]. She reports no changes to PO intake until acutely just prior to admission. She states that patient typically eats lunch and dinner. Noted from previous nutrition note, patient with intentional weight loss PTA. Wife confirms. 4/25: Wife notes that tomorrow will be 1 week of no po intake. Nutrition Background:   Patient with PMH significant for GERD with Nissen fundoplication, thyroid cancer s/p XRT, HTN, CAD. He presented with epigastric pain. He had outpatient CT with [facility] showing high grade SBO, but wanted to be admitted to [address] [place] [address] so he drove here. He is now s/p lap with reduction of internal hernia 4/21. Small bowel was trapped in hernia and was ischemic but not resected. Discussed with Supriya Méndez RN. Pt has 2 intact PIV. Nutrition Interval:  No flatus or BM yet.  JOHNSON: 550 ml over last 24 hrs Remains PPN dependent  With continued NPO status d/t post-op ileus. Pt seen in company of daughter. Pt had just completed a walk in the halls. He did have some nausea earlier this morning. They report NGT extension tubing was removed by Dr. [employer] this AM as he was not satisfied with the level of suction. If not imProved by tomorrow, then he might order CTAP. Abdominal Status (last documented): Semi-soft,Tender abdomen with Hypoactive  bowel sounds. Last BM  . Pertinent Medications:  Pepcid 20 mg/d, Thiamine 100 mg x3 doses (last dose 4/24), Zosyn TID  Pertinent Labs:   Lab Results   Component Value Date/Time    Sodium 141 04/26/2022 04:37 AM    Potassium 3.7 04/26/2022 04:37 AM    Chloride 110 (H) 04/26/2022 04:37 AM    CO2 25 04/26/2022 04:37 AM    Anion gap 6 (L) 04/26/2022 04:37 AM    Glucose 104 (H) 04/26/2022 04:37 AM    BUN 19 04/26/2022 04:37 AM    Creatinine 1.00 04/26/2022 04:37 AM    Calcium 8.6 04/26/2022 04:37 AM    Albumin 2.6 (L) 04/26/2022 04:37 AM    Magnesium 2.2 04/25/2022 06:06 AM    Phosphorus 4.0 (H) 04/25/2022 06:06 AM       Labs remarkable for K trending down, NaCl trending up. Zosyn provides significant NaCl  NaCl decreased in PPN from 80 meq/L to 50 meq/L 4/25. If trend up continues, may benefit from further decrease. Current PPN provides 60 meq K/d. May benefit from increased K in PN  Nutrition Related Findings:   4/25: No visible fat or muscle wasting, 4/24: PPN started      Current Nutrition Therapies:  DIET NPO   PPN: Dex 5%, 4.25% AA 2.4 L (100ml/hr), 250 ml 20% lipids daily . To provide: 1316 kcal/d (80% of needs), 102 grams of protein/d (100% of needs), 408 grams of CHO/d and 2650 ml of total volume/d. Current Intake:   Average Meal Intake: NPO        Anthropometric Measures:  Height: 5' 8\" (172.7 cm)  Current Body Wt: 80.7 kg (177 lb 14.6 oz) (4/20), Weight source: Stated  BMI: 27.1, Overweight (BMI 25.0-29. 9)  Admission Body Weight: 177 lb 14.6 oz  Ideal Body Weight (lbs) (Calculated): 154 lbs (70 kg), 115.5 %  Usual Body Wt: 80.7 kg (178 lb) (last office weight 2/2022), Percent weight change: 0       Edema: No data recorded  Estimated Daily Nutrient Needs:  Energy (kcal/day): 1132-8949 (Kcal/kg (20-25) Weight used: Current (80.7 kg (4/20))    Protein (g/day):  (1.2-1.3 g/kg s/p surgery) Weight Used: (Current) 80.7 kg  Fluid (ml/day):   (1 ml/kcal)    Nutrition Diagnosis:   · Inadequate oral intake related to altered GI function as evidenced by NPO or clear liquid status due to medical condition (post-op ileus)    Nutrition Interventions:   Food and/or Nutrient Delivery: Continue NPO,Modify parenteral nutrition   Education: Reviewed with pt/family current nutritional provisions of PPN and rationale for MWF MVI. Coordination of Nutrition Care: Continue to monitor while inpatient,Interdisciplinary rounds   Discussed with Sadia Cagle RN. RN assesses PIV may only last 1-2 more days. Could consider midline for continued PPN which typically last 2 weeks vs PICC if prolonged/continued ileus is expected or if pt were to require additional surgery. RN to discuss with Hospitalist.  Goals:   Previous Goal Met: Goal(s) achieved  Active Goal: Tolerate nutrition support at goal rate       Nutrition Monitoring and Evaluation:      Food/Nutrient Intake Outcomes: Parenteral nutrition intake/tolerance  Physical Signs/Symptoms Outcomes: Biochemical data,GI status,Fluid status or edema,Weight    Discharge Planning:     Too soon to determine    Nick Signs, [address] [place] [address], [facility] [address] [place] [address], [address] [place] [address]

## 2022-04-27 LAB
ALBUMIN SERPL-MCNC: 2.4 G/DL (ref 3.2–4.6)
ALBUMIN/GLOB SERPL: 0.7 {RATIO} (ref 1.2–3.5)
ALP SERPL-CCNC: 83 U/L (ref 50–136)
ALT SERPL-CCNC: 62 U/L (ref 12–65)
ANION GAP SERPL CALC-SCNC: 4 MMOL/L (ref 7–16)
AST SERPL-CCNC: 35 U/L (ref 15–37)
BILIRUB SERPL-MCNC: 0.4 MG/DL (ref 0.2–1.1)
BUN SERPL-MCNC: 19 MG/DL (ref 8–23)
CALCIUM SERPL-MCNC: 8.2 MG/DL (ref 8.3–10.4)
CHLORIDE SERPL-SCNC: 109 MMOL/L (ref 98–107)
CO2 SERPL-SCNC: 26 MMOL/L (ref 21–32)
CREAT SERPL-MCNC: 1.05 MG/DL (ref 0.8–1.5)
ERYTHROCYTE [DISTWIDTH] IN BLOOD BY AUTOMATED COUNT: 13.2 % (ref 11.9–14.6)
GLOBULIN SER CALC-MCNC: 3.4 G/DL (ref 2.3–3.5)
GLUCOSE SERPL-MCNC: 102 MG/DL (ref 65–100)
HCT VFR BLD AUTO: 37 % (ref 41.1–50.3)
HCT VFR BLD AUTO: 42.3 % (ref 41.1–50.3)
HCT VFR BLD AUTO: 43.2 % (ref 41.1–50.3)
HGB BLD-MCNC: 12.7 G/DL (ref 13.6–17.2)
HGB BLD-MCNC: 14.3 G/DL (ref 13.6–17.2)
HGB BLD-MCNC: 14.5 G/DL (ref 13.6–17.2)
LACTATE SERPL-SCNC: 1.7 MMOL/L (ref 0.4–2)
MAGNESIUM SERPL-MCNC: 2.2 MG/DL (ref 1.8–2.4)
MCH RBC QN AUTO: 32.4 PG (ref 26.1–32.9)
MCHC RBC AUTO-ENTMCNC: 34.3 G/DL (ref 31.4–35)
MCV RBC AUTO: 94.4 FL (ref 79.6–97.8)
NRBC # BLD: 0 K/UL (ref 0–0.2)
PHOSPHATE SERPL-MCNC: 2.8 MG/DL (ref 2.3–3.7)
PLATELET # BLD AUTO: 292 K/UL (ref 150–450)
PMV BLD AUTO: 8.6 FL (ref 9.4–12.3)
POTASSIUM SERPL-SCNC: 4 MMOL/L (ref 3.5–5.1)
PROT SERPL-MCNC: 5.8 G/DL (ref 6.3–8.2)
RBC # BLD AUTO: 3.92 M/UL (ref 4.23–5.6)
SODIUM SERPL-SCNC: 139 MMOL/L (ref 136–145)
WBC # BLD AUTO: 7.2 K/UL (ref 4.3–11.1)

## 2022-04-27 PROCEDURE — 74011250636 HC RX REV CODE- 250/636: Performed by: SURGERY

## 2022-04-27 PROCEDURE — 97535 SELF CARE MNGMENT TRAINING: CPT

## 2022-04-27 PROCEDURE — 74011250636 HC RX REV CODE- 250/636: Performed by: FAMILY MEDICINE

## 2022-04-27 PROCEDURE — 2709999900 HC NON-CHARGEABLE SUPPLY

## 2022-04-27 PROCEDURE — 74011250637 HC RX REV CODE- 250/637: Performed by: SURGERY

## 2022-04-27 PROCEDURE — 80053 COMPREHEN METABOLIC PANEL: CPT

## 2022-04-27 PROCEDURE — 65270000029 HC RM PRIVATE

## 2022-04-27 PROCEDURE — 85027 COMPLETE CBC AUTOMATED: CPT

## 2022-04-27 PROCEDURE — 74011000250 HC RX REV CODE- 250: Performed by: FAMILY MEDICINE

## 2022-04-27 PROCEDURE — 83735 ASSAY OF MAGNESIUM: CPT

## 2022-04-27 PROCEDURE — 36415 COLL VENOUS BLD VENIPUNCTURE: CPT

## 2022-04-27 PROCEDURE — 85018 HEMOGLOBIN: CPT

## 2022-04-27 PROCEDURE — 74011000250 HC RX REV CODE- 250: Performed by: SURGERY

## 2022-04-27 PROCEDURE — 83605 ASSAY OF LACTIC ACID: CPT

## 2022-04-27 PROCEDURE — 99024 POSTOP FOLLOW-UP VISIT: CPT | Performed by: SURGERY

## 2022-04-27 PROCEDURE — C9113 INJ PANTOPRAZOLE SODIUM, VIA: HCPCS | Performed by: FAMILY MEDICINE

## 2022-04-27 PROCEDURE — 74011000258 HC RX REV CODE- 258: Performed by: SURGERY

## 2022-04-27 PROCEDURE — 84100 ASSAY OF PHOSPHORUS: CPT

## 2022-04-27 RX ORDER — AMLODIPINE BESYLATE 2.5 MG/1
2.5 TABLET ORAL DAILY
Status: DISCONTINUED | OUTPATIENT
Start: 2022-04-27 | End: 2022-04-29 | Stop reason: HOSPADM

## 2022-04-27 RX ADMIN — LEVOTHYROXINE SODIUM 100 MCG: 20 INJECTION, SOLUTION INTRAVENOUS at 18:32

## 2022-04-27 RX ADMIN — SODIUM CHLORIDE, PRESERVATIVE FREE 40 MG: 5 INJECTION INTRAVENOUS at 21:26

## 2022-04-27 RX ADMIN — PIPERACILLIN AND TAZOBACTAM 3.38 G: 3; .375 INJECTION, POWDER, LYOPHILIZED, FOR SOLUTION INTRAVENOUS at 18:25

## 2022-04-27 RX ADMIN — SOYBEAN OIL 250 ML: 20 INJECTION, SOLUTION INTRAVENOUS at 18:24

## 2022-04-27 RX ADMIN — SODIUM CHLORIDE, PRESERVATIVE FREE 20 MG: 5 INJECTION INTRAVENOUS at 08:52

## 2022-04-27 RX ADMIN — SODIUM CHLORIDE, PRESERVATIVE FREE 40 MG: 5 INJECTION INTRAVENOUS at 14:09

## 2022-04-27 RX ADMIN — SODIUM CHLORIDE, PRESERVATIVE FREE 10 ML: 5 INJECTION INTRAVENOUS at 21:26

## 2022-04-27 RX ADMIN — HYDROMORPHONE HYDROCHLORIDE 1 MG: 1 INJECTION, SOLUTION INTRAMUSCULAR; INTRAVENOUS; SUBCUTANEOUS at 10:39

## 2022-04-27 RX ADMIN — SODIUM CHLORIDE, PRESERVATIVE FREE 10 ML: 5 INJECTION INTRAVENOUS at 14:09

## 2022-04-27 RX ADMIN — HYDROMORPHONE HYDROCHLORIDE 1 MG: 1 INJECTION, SOLUTION INTRAMUSCULAR; INTRAVENOUS; SUBCUTANEOUS at 00:58

## 2022-04-27 RX ADMIN — SODIUM CHLORIDE: 234 INJECTION, SOLUTION INTRAVENOUS at 18:24

## 2022-04-27 RX ADMIN — AMLODIPINE BESYLATE 2.5 MG: 2.5 TABLET ORAL at 08:50

## 2022-04-27 RX ADMIN — SODIUM CHLORIDE, PRESERVATIVE FREE 10 ML: 5 INJECTION INTRAVENOUS at 04:40

## 2022-04-27 RX ADMIN — ENOXAPARIN SODIUM 40 MG: 40 INJECTION SUBCUTANEOUS at 08:52

## 2022-04-27 RX ADMIN — PIPERACILLIN AND TAZOBACTAM 3.38 G: 3; .375 INJECTION, POWDER, LYOPHILIZED, FOR SOLUTION INTRAVENOUS at 00:54

## 2022-04-27 RX ADMIN — HYDROMORPHONE HYDROCHLORIDE 0.5 MG: 1 INJECTION, SOLUTION INTRAMUSCULAR; INTRAVENOUS; SUBCUTANEOUS at 22:25

## 2022-04-27 RX ADMIN — PIPERACILLIN AND TAZOBACTAM 3.38 G: 3; .375 INJECTION, POWDER, LYOPHILIZED, FOR SOLUTION INTRAVENOUS at 08:52

## 2022-04-27 NOTE — PROGRESS NOTES
NG tube removed per order and protocol. Patient tolerated well. Patient educated to notify RN staff of  Nausea/belching if it occurs. Patient verbalize understanding.

## 2022-04-27 NOTE — PROGRESS NOTES
Problem: Falls - Risk of  Goal: *Absence of Falls  Description: Document Nano Chester Fall Risk and appropriate interventions in the flowsheet.   Outcome: Progressing Towards Goal  Note: Fall Risk Interventions:            Medication Interventions: Patient to call before getting OOB    Elimination Interventions: Call light in reach              Problem: Patient Education: Go to Patient Education Activity  Goal: Patient/Family Education  Outcome: Progressing Towards Goal     Problem: Patient Education: Go to Patient Education Activity  Goal: Patient/Family Education  Outcome: Progressing Towards Goal     Problem: Small Bowel Obstruction: Day 1  Goal: Activity/Safety  Outcome: Progressing Towards Goal  Goal: Medications  Outcome: Progressing Towards Goal  Goal: Respiratory  Outcome: Progressing Towards Goal  Goal: Treatments/Interventions/Procedures  Outcome: Progressing Towards Goal  Goal: Psychosocial  Outcome: Progressing Towards Goal  Goal: *Optimal pain control at patient's stated goal  Outcome: Progressing Towards Goal  Goal: *Hemodynamically stable  Outcome: Progressing Towards Goal  Goal: *Demonstrates progressive activity  Outcome: Progressing Towards Goal  Goal: *Absence of nausea/vomiting  Outcome: Progressing Towards Goal

## 2022-04-27 NOTE — PROGRESS NOTES
Problem: Falls - Risk of  Goal: *Absence of Falls  Description: Document Antoine Sites Fall Risk and appropriate interventions in the flowsheet.   Outcome: Progressing Towards Goal  Note: Fall Risk Interventions:            Medication Interventions: Patient to call before getting OOB    Elimination Interventions: Call light in reach              Problem: Patient Education: Go to Patient Education Activity  Goal: Patient/Family Education  Outcome: Progressing Towards Goal     Problem: Patient Education: Go to Patient Education Activity  Goal: Patient/Family Education  Outcome: Progressing Towards Goal     Problem: Small Bowel Obstruction: Day 1  Goal: Off Pathway (Use only if patient is Off Pathway)  Outcome: Progressing Towards Goal  Goal: Activity/Safety  Outcome: Progressing Towards Goal  Goal: Consults, if ordered  Outcome: Progressing Towards Goal  Goal: Diagnostic Test/Procedures  Outcome: Progressing Towards Goal  Goal: Nutrition/Diet  Outcome: Progressing Towards Goal  Goal: Medications  Outcome: Progressing Towards Goal  Goal: Respiratory  Outcome: Progressing Towards Goal  Goal: Treatments/Interventions/Procedures  Outcome: Progressing Towards Goal  Goal: Psychosocial  Outcome: Progressing Towards Goal  Goal: *Optimal pain control at patient's stated goal  Outcome: Progressing Towards Goal  Goal: *Adequate urinary output (equal to or greater than 30 milliliters/hour)  Outcome: Progressing Towards Goal  Goal: *Hemodynamically stable  Outcome: Progressing Towards Goal  Goal: *Demonstrates progressive activity  Outcome: Progressing Towards Goal  Goal: *Absence of nausea/vomiting  Outcome: Progressing Towards Goal     Problem: Small Bowel Obstruction: Day 2  Goal: Off Pathway (Use only if patient is Off Pathway)  Outcome: Progressing Towards Goal  Goal: Activity/Safety  Outcome: Progressing Towards Goal  Goal: Consults, if ordered  Outcome: Progressing Towards Goal  Goal: Diagnostic Test/Procedures  Outcome: Progressing Towards Goal  Goal: Nutrition/Diet  Outcome: Progressing Towards Goal  Goal: Discharge Planning  Outcome: Progressing Towards Goal  Goal: Medications  Outcome: Progressing Towards Goal  Goal: Respiratory  Outcome: Progressing Towards Goal  Goal: Treatments/Interventions/Procedures  Outcome: Progressing Towards Goal  Goal: Psychosocial  Outcome: Progressing Towards Goal  Goal: *Optimal pain control at patient's stated goal  Outcome: Progressing Towards Goal  Goal: *Adequate urinary output (equal to or greater than 30 milliliters/hour)  Outcome: Progressing Towards Goal  Goal: *Hemodynamically stable  Outcome: Progressing Towards Goal  Goal: *Demonstrates progressive activity  Outcome: Progressing Towards Goal  Goal: *Absence of nausea/vomiting  Outcome: Progressing Towards Goal  Goal: *Return of normal bowel function  Outcome: Progressing Towards Goal     Problem: Small Bowel Obstruction: Day 3  Goal: Off Pathway (Use only if patient is Off Pathway)  Outcome: Progressing Towards Goal  Goal: Activity/Safety  Outcome: Progressing Towards Goal  Goal: Consults, if ordered  Outcome: Progressing Towards Goal  Goal: Diagnostic Test/Procedures  Outcome: Progressing Towards Goal  Goal: Nutrition/Diet  Outcome: Progressing Towards Goal  Goal: Discharge Planning  Outcome: Progressing Towards Goal  Goal: Medications  Outcome: Progressing Towards Goal  Goal: Respiratory  Outcome: Progressing Towards Goal  Goal: Treatments/Interventions/Procedures  Outcome: Progressing Towards Goal  Goal: Psychosocial  Outcome: Progressing Towards Goal  Goal: *Optimal pain control at patient's stated goal  Outcome: Progressing Towards Goal  Goal: *Adequate urinary output (equal to or greater than 30 milliliters/hour)  Outcome: Progressing Towards Goal  Goal: *Hemodynamically stable  Outcome: Progressing Towards Goal  Goal: *Adequate nutrition  Outcome: Progressing Towards Goal  Goal: *Demonstrates progressive activity  Outcome: Progressing Towards Goal  Goal: *Participates in discharge planning  Outcome: Progressing Towards Goal     Problem: Small Bowel Obstruction: Day 4 to Discharge  Goal: Off Pathway (Use only if patient is Off Pathway)  Outcome: Progressing Towards Goal  Goal: Activity/Safety  Outcome: Progressing Towards Goal  Goal: Nutrition/Diet  Outcome: Progressing Towards Goal  Goal: Discharge Planning  Outcome: Progressing Towards Goal  Goal: Medications  Outcome: Progressing Towards Goal  Goal: Respiratory  Outcome: Progressing Towards Goal  Goal: Treatments/Interventions/Procedures  Outcome: Progressing Towards Goal  Goal: Psychosocial  Outcome: Progressing Towards Goal     Problem: Small Bowel Obstruction - Non Surgical: Discharge Outcomes  Goal: *Hemodynamically stable  Outcome: Progressing Towards Goal  Goal: *Demonstrates independent activity or return to baseline  Outcome: Progressing Towards Goal  Goal: *Optimal pain control at patient's stated goal  Outcome: Progressing Towards Goal  Goal: *Verbalizes understanding and describes prescribed diet  Outcome: Progressing Towards Goal  Goal: *Tolerating diet  Outcome: Progressing Towards Goal  Goal: *Verbalizes name, dosage, time, side effects, and number of days to continue medications  Outcome: Progressing Towards Goal  Goal: *Anxiety reduced or absent  Outcome: Progressing Towards Goal  Goal: *Understands and describes signs and symptoms to report to providers(Stroke Metric)  Outcome: Progressing Towards Goal  Goal: *Describes follow-up/return visits to physicians  Outcome: Progressing Towards Goal  Goal: *Describes available resources and support systems  Outcome: Progressing Towards Goal  Goal: *Active bowel function  Outcome: Progressing Towards Goal

## 2022-04-27 NOTE — PROGRESS NOTES
Hospitalist Progress Note   Admit Date:  2022  4:44 PM   Name:  Boris Wadsworth. Age:  80 y.o. Sex:  male  :  1950   MRN:  [id number]   Room:  [facility]/    Presenting Complaint: Abdominal Pain and Abnormal Lab Results    Reason(s) for Admission: SBO (small bowel obstruction) [facility] Course & Interval History:   Boris Aguiar is a 80 y.o. male with medical history of GERD s/p nissen fundoplication 731, thyroid CA s/p resection, HTN, CAD who presented with epigastrict abdominal pain and RUQ pain that started around 4pm yesterday. Has not passed gas or had a BM since onset of symptoms. He had an outpatient CT today showing high grade SBO @ [facility] but wanted to be admitted at [facility] so he drove here. Labs significant for WBC 19K with neutrophile predominance. rec'd zosyn in ED. General surgery was notified and requested hospitalist admission. Patient in severe discomfort on my eval. Family at bedside. No PO intake > 24 hours    He is s/p Nissen fundoplication performed in  by Dr. Paresh García at an outside hospital and reports he has been unable to vomit since then. He is s/p laparoscopic cholecystectomy  by Dr. Paresh García  He is s/p open appendectomy . He is s/p colonoscopy in 2019 and reports this was normal with his next planned in 5 years.     OSH CT a/p - Findings consistent with high-grade small bowel obstruction with transition point within the right upper quadrant. Admitted NPO, NGT, IVF, Zosyn with no improvement overnight and urgently transferred to OR. Now s/p expl laparotomy and reduction of internal hernia for high grade SBO on 22. Small bowel entrapped in the hernia was ischemic and injured but not resected and looked injury looked reversible but will take some time per , surgery.  - TPN initiated     Subjective/24hr Events (22):  POD6 3 episodes of flatus today.  About an hour prior to my eval, blood tinged output from NGT. Suction was reduced. Pain unchanged, vital signs stable. ROS:  10 systems reviewed and negative except as noted above. Assessment & Plan:     High grade SBO with SIRS - refractory to bowel compression and rest. Now s/p expl laparotomy and reduction of internal hernia for high grade SBO on 4/21/22. Small bowel entrapped in the hernia was ischemic and injured but not resected and looked injury looked reversible but will take some time per , surgery. NGT to LIS. Zosyn. TPN. Monitor electrolytes and volume status. GERD s/p buzz fundoplication - at risk of GIB and ulceration. Change pepcid to PPI BID and trend h/h q8h x 2. Acute liver injury - responded to medical therapy. LFTs normalized s/p surgery, abx and IVF. Trend CMP. Suspect 2/2 early sepsis from ischemic bowel. RUQ US showed normal size liver and heterogenous appearance and some contour nodularity suggested. No h/o alcohol abuse. H/o hepatoxic agent accicdentally taken double the dose years ago and was closely monitored for hepatic failure thereafter. Acute hepatitis panel negative. Lab Results   Component Value Date/Time    ALT (SGPT) 62 04/27/2022 04:14 AM    AST (SGOT) 35 04/27/2022 04:14 AM    Alk. phosphatase 83 04/27/2022 04:14 AM    Bilirubin, total 0.4 04/27/2022 04:14 AM       GINNY 2/2 ATN - improved s/p IVF. [facility] Records Now back to baseline Scr ~1.0. cont. IVF, supportive care. Lab Results   Component Value Date/Time    Creatinine 1.05 04/27/2022 04:14 AM     Hyperglycemia - 2/2 severe illness, fasting  on admission now improved. a1c 5.6%. cont SSI q6h while NPO     Postsurgical hypothyroidism - started IV synthroid on 4/24    HTN - hold home meds.      CAD - BB held due to bradycardia. Other meds held due to NPO state.        Chronic coronary artery disease       Papillary thyroid carcinoma, multifocal (3 tumors in the left lobe, largest tumor 0.9 cm), status post thyroidectomy/central lymph node dissection 5/26/2015 and 54.5 mCi iodine-131     - follows with endocrinology outpatient. Discharge Planning:      Not stable     Diet:  DIET NPO  TPN ADULT - PERIPHERAL - dextrose 5% amino acid 4.25%   DVT PPx: lovenox  Code status: Full Code    Hospital Problems as of 4/27/2022 Date Reviewed: 12/14/2021          Codes Class Noted - Resolved POA    * (Principal) SBO (small bowel obstruction) ([facility] [address] ICD-10-CM: P38.244  ICD-9-CM: 560.9  4/20/2022 - Present Yes    Overview Addendum 4/21/2022  1:49 PM by Diego Dacosta MD     4/21/22 s/p expl lap and reduction of internal hernia for high grade SBO; Dr Chapin Jasso             On total parenteral nutrition (TPN) ICD-10-CM: Z78.9  ICD-9-CM: V49.89  4/26/2022 - Present No        Status post exploratory laparotomy ICD-10-CM: N19.485  ICD-9-CM: V45.89  4/26/2022 - Present No        Acute kidney injury (GINNY) with acute tubular necrosis (ATN) ([facility] [address] ICD-10-CM: N17.0  ICD-9-CM: 584.5  4/21/2022 - Present Yes        Postsurgical hypothyroidism ICD-10-CM: E89.0  ICD-9-CM: 244.0  Unknown - Present Yes        Papillary thyroid carcinoma, multifocal (3 tumors in the left lobe, largest tumor 0.9 cm), status post thyroidectomy/central lymph node dissection 5/26/2015 and 54.5 mCi iodine-131 7/22/2015 ICD-10-CM: C73  ICD-9-CM: 976  6/21/2016 - Present Yes        GERD (gastroesophageal reflux disease) ICD-10-CM: K21.9  ICD-9-CM: 530.81  Unknown - Present Yes        Chronic coronary artery disease ICD-10-CM: I25.10  ICD-9-CM: 414.00  9/3/2015 - Present Yes    Overview Signed 6/21/2016  8:29 AM by Brenda JOHNS     Overview:   BMS RCA 2008; NORMAL STRESS TEST 2014    Last Assessment & Plan:   He does not have any anginal quality chest pain. He occasionally has sharp atypical pain. His stress test was normal less than a year ago. I don't plan any further investigation.              RESOLVED: Leukocytosis ICD-10-CM: U42.658  ICD-9-CM: 288.60  4/21/2022 - 4/26/2022 Yes RESOLVED: Generalized abdominal pain ICD-10-CM: R10.84  ICD-9-CM: 789.07  4/21/2022 - 4/26/2022 Yes        RESOLVED: Transaminitis ICD-10-CM: R74.01  ICD-9-CM: 790.4  4/21/2022 - 4/26/2022 No              Objective:     Patient Vitals for the past 24 hrs:   Temp Pulse Resp BP SpO2   04/27/22 1122 98.4 °F (36.9 °C) (!) 55 17 (!) 155/75 97 %   04/27/22 0705 97.5 °F (36.4 °C) (!) 53 18 (!) 168/66 99 %   04/27/22 0420 97.7 °F (36.5 °C) (!) 48 18 (!) 144/82 96 %   04/26/22 2300 97.8 °F (36.6 °C) (!) 50 16 (!) 154/76 96 %   04/26/22 1923 97.7 °F (36.5 °C) (!) 52 18 (!) 162/77 98 %   04/26/22 1529 97.5 °F (36.4 °C) (!) 54 20 (!) 155/74 99 %     Oxygen Therapy  O2 Sat (%): 97 % (04/27/22 1122)  O2 Device: None (Room air) (04/27/22 0420)  O2 Flow Rate (L/min): 10 l/min (04/21/22 1416)    Estimated body mass index is 27.06 kg/m² as calculated from the following:    Height as of this encounter: 5' 8\" (1.727 m). Weight as of this encounter: 80.7 kg (178 lb). Intake/Output Summary (Last 24 hours) at 4/27/2022 1150  Last data filed at 4/27/2022 0551  Gross per 24 hour   Intake    Output 450 ml   Net -450 ml         Physical Exam:     Blood pressure (!) 155/75, pulse (!) 55, temperature 98.4 °F (36.9 °C), resp. rate 17, height 5' 8\" (1.727 m), weight 80.7 kg (178 lb), SpO2 97 %. General:    Less Ill appearing. Resting  In bed   Head:  Normocephalic, atraumatic  Eyes:  Sclerae appear normal.  Pupils equally round. ENT:  NGT in place. Neck:  No restricted ROM. Trachea midline   CV:   RRR. No m/r/g. No jugular venous distension. Lungs:   CTAB. No wheezing, rhonchi, or rales. Respirations even, unlabored  Abdomen: Bowel sounds present. Soft. Non Distended. RUQ tenderness  Extremities: No cyanosis or clubbing. No edema  Skin:     No rashes and normal coloration. Warm and dry. Neuro:  CN II-XII grossly intact. Sensation intact. A&Ox3  Psych:  Normal mood and affect.       I have reviewed ordered lab tests and independently visualized imaging below:    Recent Labs:  Recent Results (from the past 48 hour(s))   CBC W/O DIFF    Collection Time: 04/26/22  4:37 AM   Result Value Ref Range    WBC 7.1 4.3 - 11.1 K/uL    RBC 3.83 (L) 4.23 - 5.6 M/uL    HGB 12.4 (L) 13.6 - 17.2 g/dL    HCT 35.8 (L) 41.1 - 50.3 %    MCV 93.5 79.6 - 97.8 FL    MCH 32.4 26.1 - 32.9 PG    MCHC 34.6 31.4 - 35.0 g/dL    RDW 13.1 11.9 - 14.6 %    PLATELET 361 794 - 750 K/uL    MPV 8.6 (L) 9.4 - 12.3 FL    ABSOLUTE NRBC 0.00 0.0 - 0.2 K/uL   METABOLIC PANEL, COMPREHENSIVE    Collection Time: 04/26/22  4:37 AM   Result Value Ref Range    Sodium 141 136 - 145 mmol/L    Potassium 3.7 3.5 - 5.1 mmol/L    Chloride 110 (H) 98 - 107 mmol/L    CO2 25 21 - 32 mmol/L    Anion gap 6 (L) 7 - 16 mmol/L    Glucose 104 (H) 65 - 100 mg/dL    BUN 19 8 - 23 MG/DL    Creatinine 1.00 0.8 - 1.5 MG/DL    GFR est AA >60 >60 ml/min/1.73m2    GFR est non-AA >60 >60 ml/min/1.73m2    Calcium 8.6 8.3 - 10.4 MG/DL    Bilirubin, total 0.4 0.2 - 1.1 MG/DL    ALT (SGPT) 50 12 - 65 U/L    AST (SGOT) 26 15 - 37 U/L    Alk.  phosphatase 77 50 - 136 U/L    Protein, total 5.7 (L) 6.3 - 8.2 g/dL    Albumin 2.6 (L) 3.2 - 4.6 g/dL    Globulin 3.1 2.3 - 3.5 g/dL    A-G Ratio 0.8 (L) 1.2 - 3.5     CBC W/O DIFF    Collection Time: 04/27/22  4:14 AM   Result Value Ref Range    WBC 7.2 4.3 - 11.1 K/uL    RBC 3.92 (L) 4.23 - 5.6 M/uL    HGB 12.7 (L) 13.6 - 17.2 g/dL    HCT 37.0 (L) 41.1 - 50.3 %    MCV 94.4 79.6 - 97.8 FL    MCH 32.4 26.1 - 32.9 PG    MCHC 34.3 31.4 - 35.0 g/dL    RDW 13.2 11.9 - 14.6 %    PLATELET 340 032 - 930 K/uL    MPV 8.6 (L) 9.4 - 12.3 FL    ABSOLUTE NRBC 0.00 0.0 - 0.2 K/uL   METABOLIC PANEL, COMPREHENSIVE    Collection Time: 04/27/22  4:14 AM   Result Value Ref Range    Sodium 139 136 - 145 mmol/L    Potassium 4.0 3.5 - 5.1 mmol/L    Chloride 109 (H) 98 - 107 mmol/L    CO2 26 21 - 32 mmol/L    Anion gap 4 (L) 7 - 16 mmol/L    Glucose 102 (H) 65 - 100 mg/dL    BUN 19 8 - 23 MG/DL    Creatinine 1.05 0.8 - 1.5 MG/DL    GFR est AA >60 >60 ml/min/1.73m2    GFR est non-AA >60 >60 ml/min/1.73m2    Calcium 8.2 (L) 8.3 - 10.4 MG/DL    Bilirubin, total 0.4 0.2 - 1.1 MG/DL    ALT (SGPT) 62 12 - 65 U/L    AST (SGOT) 35 15 - 37 U/L    Alk. phosphatase 83 50 - 136 U/L    Protein, total 5.8 (L) 6.3 - 8.2 g/dL    Albumin 2.4 (L) 3.2 - 4.6 g/dL    Globulin 3.4 2.3 - 3.5 g/dL    A-G Ratio 0.7 (L) 1.2 - 3.5     PHOSPHORUS    Collection Time: 04/27/22  4:14 AM   Result Value Ref Range    Phosphorus 2.8 2.3 - 3.7 MG/DL   MAGNESIUM    Collection Time: 04/27/22  4:14 AM   Result Value Ref Range    Magnesium 2.2 1.8 - 2.4 mg/dL   LACTIC ACID    Collection Time: 04/27/22  8:44 AM   Result Value Ref Range    Lactic acid 1.7 0.4 - 2.0 MMOL/L       All Micro Results     Procedure Component Value Units Date/Time    CULTURE, BLOOD [[id number]] Collected: 04/20/22 1759    Order Status: Completed Specimen: Blood Updated: 04/25/22 1228     Special Requests: --        NO SPECIAL REQUESTS  LEFT  Antecubital       Culture result: NO GROWTH 5 DAYS       CULTURE, BLOOD [[id number]] Collected: 04/20/22 1907    Order Status: Completed Specimen: Blood Updated: 04/25/22 1228     Special Requests: --        RIGHT  FOREARM       Culture result: NO GROWTH 5 DAYS             Other Studies:  No results found.     Current Meds:  Current Facility-Administered Medications   Medication Dose Route Frequency    amLODIPine (NORVASC) tablet 2.5 mg  2.5 mg Oral DAILY    TPN ADULT - PERIPHERAL - dextrose 5% amino acid 4.25%    IntraVENous QPM    pantoprazole (PROTONIX) 40 mg in 0.9% sodium chloride 10 mL injection  40 mg IntraVENous Q12H    TPN ADULT - dextrose 5% amino acid 4.25%   IntraVENous QPM    labetaloL (NORMODYNE;TRANDATE) injection 20 mg  20 mg IntraVENous Q4H PRN    fat emulsion 20% (LIPOSYN, INTRAlipid) infusion 250 mL  250 mL IntraVENous QPM    levothyroxine (SYNTHROID) injection 100 mcg  100 mcg IntraVENous Q24H    carboxymethylcellulose sodium (REFRESH LIQUIGEL) 1 % ophthalmic solution 1 Drop  1 Drop Both Eyes PRN    NUTRITIONAL SUPPORT ELECTROLYTE PRN ORDERS   Does Not Apply PRN    HYDROmorphone (DILAUDID) injection 0.5 mg  0.5 mg IntraVENous Q3H PRN    Or    HYDROmorphone (DILAUDID) injection 1 mg  1 mg IntraVENous Q3H PRN    phenol throat spray (CHLORASEPTIC) 1 Spray  1 Spray Oral PRN    sodium chloride (NS) flush 5-40 mL  5-40 mL IntraVENous Q8H    sodium chloride (NS) flush 5-40 mL  5-40 mL IntraVENous PRN    acetaminophen (TYLENOL) tablet 650 mg  650 mg Oral Q6H PRN    ondansetron (ZOFRAN) injection 4 mg  4 mg IntraVENous Q6H PRN    [Held by provider] enoxaparin (LOVENOX) injection 40 mg  40 mg SubCUTAneous DAILY    piperacillin-tazobactam (ZOSYN) 3.375 g in 0.9% sodium chloride (MBP/ADV) 100 mL MBP  3.375 g IntraVENous Q8H    glucose chewable tablet 16 g  16 g Oral PRN    glucagon (GLUCAGEN) injection 1 mg  1 mg IntraMUSCular PRN    dextrose 10% infusion 125-250 mL  125-250 mL IntraVENous PRN    [Held by provider] famotidine (PF) (PEPCID) 20 mg in 0.9% sodium chloride 10 mL injection  20 mg IntraVENous DAILY    naloxone (NARCAN) injection 0.2 mg  0.2 mg IntraVENous EVERY 2 MINUTES AS NEEDED       Signed:  Rajesh Brewer DO    Part of this note may have been written by using a voice dictation software. The note has been proof read but may still contain some grammatical/other typographical errors.

## 2022-04-27 NOTE — PROGRESS NOTES
Hourly rounds completed this shift. Patient ambulated in mejias 5 times today. +flatus. Report given to nigh shift RN.

## 2022-04-27 NOTE — PROGRESS NOTES
Problem: Self Care Deficits Care Plan (Adult)  Goal: *Acute Goals and Plan of Care (Insert Text)  Outcome: Progressing Towards Goal  Note:   1. Patient will complete lower body dressing with supervision to increase self care independence. GOAL MET 4/27/2022  2.   3. Patient will complete bathing with supervision to increase self care independence. 4. Patient will tolerate 40 minutes of OT treatment with self incorporated rest breaks to increase activity tolerance to enhance participation in hobbies. 5. Patient will complete all functional transfers with supervision using adaptive equipment as needed. GOAL MET 4/27/2022  6.   7. Patient will complete UE exercises with supervision to increase overall activity tolerance and strength. Timeframe: 7 visits          OCCUPATIONAL THERAPY: Daily Note, Discharge and AM 4/27/2022  INPATIENT: OT Visit Days: 3  [place] / [place] / Product Type: Medicare /      NAME/AGE/GENDER: Jannis Prader. is a 80 y.o. male   PRIMARY DIAGNOSIS:  [facility] [K56.609] SBO (small bowel obstruction) (HCC) SBO (small bowel obstruction) (HCC)  Procedure(s) (LRB):  LAPAROTOMY EXPLORATORY POSSIBLE BOWEL RESECTION (N/A)  6 Days Post-Op  ICD-10: Treatment Diagnosis:    · Generalized Muscle Weakness (M62.81)  · Other lack of cordination (R27.8)   Precautions/Allergies:     Patient has no known allergies. ASSESSMENT:     Mr. Wick Pain presents SBO and recent bowel resection as resulting dx. Patient able to be removed from wall GI suction with Nurse assistance. Patient is a hard worker and feeling approprietly poor due to dx. He should progress steadily and quickly as he has a high baseline and motivated. Just mild weakness and balance deficits affecting ADL's and mobility. Will do well at home at d/c. Initiate OT.     4/23/22 Pt was sitting in chair upon arrival. Pt had just finished walking with PT.  Pt completed the exercises below on B UE's with green thera band. Continue POC.     4/25/22 1050am Patient seen earlier this am walking in the hallway with his wife independently. He gets in and out of bed with SBa. He is very mindful of his NG tube and IV. He is able to don his socks and he stood and took steps managing his cords. He returned to supine. NO skilled OT indicated at this time as he is independent with not attached to the NG tube. He plans to return home with his wife. They have a shower chair at home. He may end up having surgery again today pending MD recommendation. He is in agreement with OT poc. Advised patient and wife OT services available if he has a change in status or notices a decline. Discharge OT. This section established at most recent assessment   PROBLEM LIST (Impairments causing functional limitations):  1. Decreased Strength  2. Decreased ADL/Functional Activities  3. Decreased Balance  4. Decreased Activity Tolerance   INTERVENTIONS PLANNED: (Benefits and precautions of occupational therapy have been discussed with the patient.)  1. Activities of daily living training  2. Neuromuscular re-eduation  3. Therapeutic activity  4. Therapeutic exercise     TREATMENT PLAN: Frequency/Duration: Follow patient 1-2tx to address above goals. Rehabilitation Potential For Stated Goals: Good     REHAB RECOMMENDATIONS (at time of discharge pending progress):    Placement: It is my opinion, based on this patient's performance to date, that Mr. SHIV MEDINA may benefit from being discharged with NO further skilled therapy due to the high likelihood of returning to baseline.   Equipment:    None at this time              OCCUPATIONAL PROFILE AND HISTORY:   History of Present Injury/Illness (Reason for Referral):  See H&P  Past Medical History/Comorbidities:   Mr. SHIV MEDINA  has a past medical history of Arthritis, Chronic coronary artery disease, Gastroesophageal reflux disease, Gout, Hypercholesterolemia, Hypertension, Malignant melanoma, Papillary thyroid carcinoma, multifocal (3 tumors in the left lobe, largest tumor 0.9 cm), status post thyroidectomy/central lymph node dissection 5/26/2015 and 54.5 mCi iodine-131 7/22/2015, and Postsurgical hypothyroidism. Mr. Елена Vizcarra  has a past surgical history that includes hx coronary stent placement (2010); hx malignant skin lesion excision (2011); hx cholecystectomy (2005); hx knee arthroscopy (Right, 2005); hx tonsillectomy (Age 4); hx appendectomy (1969); hx thyroidectomy (5/26/2015); hx gi (2008); and hx coronary artery bypass graft (08/19/2016). Social History/Living Environment:   Home Environment: Apartment  # Steps to Enter: 4  Rails to Enter: Yes  [employer] : Left  One/Two Story Residence: One story  Living Alone: No  Support Systems: Spouse/Significant Other William Galeazzi [phone])  Patient Expects to be Discharged to[de-identified] Home with family assistance  Current DME Used/Available at Home: None  Prior Level of Function/Work/Activity:  Independent ADL's and IADL's. Enjoys fishing. Drives. Number of Personal Factors/Comorbidities that affect the Plan of Care: Brief history (0):  LOW COMPLEXITY   ASSESSMENT OF OCCUPATIONAL PERFORMANCE[de-identified]   Activities of Daily Living:   Basic ADLs (From Assessment) Complex ADLs (From Assessment)   Feeding: Independent  Oral Facial Hygiene/Grooming: Supervision  Bathing: Contact guard assistance  Upper Body Dressing: Setup  Lower Body Dressing: Contact guard assistance  Toileting: Contact guard assistance     Grooming/Bathing/Dressing Activities of Daily Living     Cognitive Retraining  Safety/Judgement: Awareness of environment; Fall prevention     Feeding  Feeding Assistance:  (NPO)                 Bed/Mat Mobility  Supine to Sit: Supervision  Sit to Supine: Supervision  Sit to Stand: Independent  Stand to Sit: Independent     Most Recent Physical Functioning:   Gross Assessment:                  Posture:     Balance:  Sitting: Intact; Without support  Standing: Without support Bed Mobility:  Supine to Sit: Supervision  Sit to Supine: Supervision  Wheelchair Mobility:     Transfers:  Sit to Stand: Independent  Stand to Sit: Independent            Patient Vitals for the past 6 hrs:   BP BP Patient Position SpO2 Pulse   22 0705 (!) 168/66 Sitting 99 % (!) 53       Mental Status  Neurologic State: Alert,Appropriate for age  Orientation Level: Appropriate for age  Cognition: Appropriate decision making,Appropriate for age attention/concentration,Appropriate safety awareness,Follows commands  Perception: Appears intact  Perseveration: No perseveration noted  Safety/Judgement: Awareness of environment,Fall prevention                          Physical Skills Involved:  1. Range of Motion  2. Balance  3. Strength  4. Activity Tolerance Cognitive Skills Affected (resulting in the inability to perform in a timely and safe manner):  1. [facility] Psychosocial Skills Affected:  1. WFL   Number of elements that affect the Plan of Care: 1-3:  LOW COMPLEXITY   CLINICAL DECISION MAKIN[address] [place] [id number] AM-PAC 6 Clicks   Daily Activity Inpatient Short Form  How much help from another person does the patient currently need. .. Total A Lot A Little None   1. Putting on and taking off regular lower body clothing? [] 1   [] 2   [x] 3   [] 4   2. Bathing (including washing, rinsing, drying)? [] 1   [] 2   [x] 3   [] 4   3. Toileting, which includes using toilet, bedpan or urinal?   [] 1   [] 2   [x] 3   [] 4   4. Putting on and taking off regular upper body clothing? [] 1   [] 2   [] 3   [x] 4   5. Taking care of personal grooming such as brushing teeth? [] 1   [] 2   [] 3   [x] 4   6. Eating meals? [x] 1   [] 2   [] 3   [x] 4   © , Trustees of [facility] [address] [address], under license to [employer].  All rights reserved      Score:  Initial: 21 Most Recent:   21 discharge 22    Interpretation of Tool:  Represents activities that are increasingly more difficult (i.e. Bed mobility, Transfers, Gait). ·    Use of outcome tool(s) and clinical judgement create a POC that gives a: MODERATE COMPLEXITY         TREATMENT:   (In addition to Assessment/Re-Assessment sessions the following treatments were rendered)     Pre-treatment Symptoms/Complaints:    Pain: Initial:     0/10 Post Session:  0/10     Self Care: (10): Procedure(s) (per grid) utilized to improve and/or restore self-care/home management as related to dressing, bathing, toileting and mobility. Required minimal visual and verbal cueing to facilitate activities of daily living skills, compensatory activities and home safety.       B UE's with green thera band  Date:  4/23/22 Date:   Date:     Activity/Exercise Parameters Parameters Parameters   Shoulder hor abd/add  15 reps      Shoulder flex/ex 15 reps      Elbow flex/ex  15 reps      Punches  15 reps                              Braces/Orthotics/Lines/Etc:   · IV  · NG tube  · O2 Device: None (Room air)  Treatment/Session Assessment:    · Response to Treatment:  Good, sitting up in bed  · Interdisciplinary Collaboration:   o Occupational Therapist  o Registered Nurse  o Certified [facility] [address]  · After treatment position/precautions:   o Supine in bed  o Bed/Chair-wheels locked  o Bed in low position  o Call light within reach  o RN notified  o Family at bedside   ·  discharge OT  Total Treatment Duration:10  OT Patient Time In/Time Out  Time In: 1050  Time Out: Jw Anguiano 20, OT

## 2022-04-27 NOTE — PROGRESS NOTES
H&P/Consult Note/Progress Note/Office Note:   Lila Adorno MRN: [id number]  VQO:5/12/1898  Age:71 y.o.    HPI: Lila Adorno is a 80 y.o. male who is s/p expl laparotomy and reduction of internal hernia for high grade SBO on 4/21/22. Prior to surgery he was admitted by the hospitalist after he came to the ER on 4/20/22 with a 2-day history of diffuse, constant, progressive, severe abdominal pain. Nothing in particular made his pain better or worse. He had associated nausea but no vomiting  No recent abdominal trauma reported  He is s/p Nissen fundoplication performed in 2006 by Dr. Dheeraj farah at an outside hospital and reports he has been unable to vomit since then. He is s/p laparoscopic cholecystectomy 2006 by Dr. Dheeraj farah  He is s/p open appendectomy 1969. He is s/p colonoscopy in 2019 and reports this was normal with his next planned in 5 years. He had leukocytosis in the ER and a CT scan as shown below with a high-grade small bowel obstruction. An NG tube was placed to decompression and he was given IV fluids and bowel rest without improvement in his abdominal pain  Surgery was consulted by Dr Janis Sims and Dr Rodrigo Rodriguez      4/20/22 CT abd/pelvis with oral and IV contrast  LOWER CHEST: Unremarkable. ABDOMEN AND PELVIS:   Liver: Unremarkable. Biliary system: Cholecystectomy.     Pancreas: Unremarkable. Spleen: Unremarkable. Adrenals: Unremarkable.     Genitourinary: Symmetric renal enhancement. No hydronephrosis or ureteral calculus. Urinary bladder decompressed. Reproductive organs: Prostate gland is enlarged. Gastrointestinal tract: Multiple dilated loops of small bowel present throughout the abdomen. There is abrupt transition to decompressed bowel involving the proximal ileum within the right lower quadrant (3, 62). Peritoneum/Extraperitoneum: Unremarkable. Small amount of abdominopelvic ascites.      Vascular: Unremarkable   Musculoskeletal:  Degenerative changes of thoracolumbar spine. No acute or aggressive osseous lesion. IMPRESSION:   Findings consistent with high-grade small bowel obstruction with transition point within the right upper quadrant. 4/21/22 abd US  Hx: Elevated LFTs      Findings: The liver is normal in size measuring 14.7 cm. No intrahepatic biliary ductal dilitation is seen. The liver appears heterogeneous and some contour nodularity is suggested. These can suggest cirrhosis. The main portal vein is patent and demonstrates hepatopedal flow.     The gallbladder has been removed. The common bile duct is normal in caliber measuring 3.2 mm.     The pancreas is not seen due to overlying bowel gas and therefore cannot be assessed.     The right kidney measures 10.4 cm in length. No shadowing stones, or hydronephrosis is seen of the right kidney. Only increased parenchymal echogenicity is seen suggested chronic medical renal changes.     The IVC is patent. The distal abdominal aorta is normal in caliber measuring 1.4cm. The non-visualized portions of the aorta cannot be evaluated. Small upper abdominal ascites is seen.     IMPRESSION  1. Heterogeneous liver with some questionable contour nodularity. These can be indicated to cirrhosis. 2.  No intra- or extrahepatic biliary ductal dilitation. 3.  Patent portal vein and IVC. Flow in the portal vein is hepatopedal in direction.    4. Small upper abdominal ascites.          additionl hx:  4/22/22 POD1 AF/VSS NGT; Follow LFTs  4/23/22 POD2 no new complaints; no flatus, wbc normal; Hepatitis panel negative  4/24/22 POD3 AF, normal WBC; repeat LFTs normal Toradol added for pain  4/25/22 POD4 elevated BP, still with sme abd pain, no flatus; AF, normal WBC  4/26/22 POD5 AF/VSS, WBC normal; no flatus yet  4/27/22 POD6 AF/VSS, up in a chair, comfortable, WBC nml, lactic acid normal; +flatus; NGT out today                   Past Medical History:   Diagnosis Date    Arthritis     Chronic coronary artery disease     Gastroesophageal reflux disease     Gout     Hypercholesterolemia     Hypertension     Malignant melanoma     Papillary thyroid carcinoma, multifocal (3 tumors in the left lobe, largest tumor 0.9 cm), status post thyroidectomy/central lymph node dissection 5/26/2015 and 54.5 mCi iodine-131 7/22/2015     Postsurgical hypothyroidism      Past Surgical History:   Procedure Laterality Date    HX APPENDECTOMY  1969    HX CHOLECYSTECTOMY  2005    HX CORONARY ARTERY BYPASS GRAFT  08/19/2016    Dr. Montez Watson at [address] [facility]  2010    HX GI  2008    Nissen fundoplication    HX KNEE ARTHROSCOPY Right 2005    HX MALIGNANT SKIN LESION EXCISION  2011    melanoma removed from ear lobe    HX THYROIDECTOMY  5/26/2015    including central lymph node dissection    HX TONSILLECTOMY  Age 4     Current Facility-Administered Medications   Medication Dose Route Frequency    amLODIPine (NORVASC) tablet 2.5 mg  2.5 mg Oral DAILY    TPN ADULT - dextrose 5% amino acid 4.25%   IntraVENous QPM    labetaloL (NORMODYNE;TRANDATE) injection 20 mg  20 mg IntraVENous Q4H PRN    fat emulsion 20% (LIPOSYN, INTRAlipid) infusion 250 mL  250 mL IntraVENous QPM    levothyroxine (SYNTHROID) injection 100 mcg  100 mcg IntraVENous Q24H    carboxymethylcellulose sodium (REFRESH LIQUIGEL) 1 % ophthalmic solution 1 Drop  1 Drop Both Eyes PRN    NUTRITIONAL SUPPORT ELECTROLYTE PRN ORDERS   Does Not Apply PRN    HYDROmorphone (DILAUDID) injection 0.5 mg  0.5 mg IntraVENous Q3H PRN    Or    HYDROmorphone (DILAUDID) injection 1 mg  1 mg IntraVENous Q3H PRN    phenol throat spray (CHLORASEPTIC) 1 Spray  1 Spray Oral PRN    sodium chloride (NS) flush 5-40 mL  5-40 mL IntraVENous Q8H    sodium chloride (NS) flush 5-40 mL  5-40 mL IntraVENous PRN    acetaminophen (TYLENOL) tablet 650 mg  650 mg Oral Q6H PRN    ondansetron (ZOFRAN) injection 4 mg  4 mg IntraVENous Q6H PRN    enoxaparin (LOVENOX) injection 40 mg  40 mg SubCUTAneous DAILY    piperacillin-tazobactam (ZOSYN) 3.375 g in 0.9% sodium chloride (MBP/ADV) 100 mL MBP  3.375 g IntraVENous Q8H    glucose chewable tablet 16 g  16 g Oral PRN    glucagon (GLUCAGEN) injection 1 mg  1 mg IntraMUSCular PRN    dextrose 10% infusion 125-250 mL  125-250 mL IntraVENous PRN    famotidine (PF) (PEPCID) 20 mg in 0.9% sodium chloride 10 mL injection  20 mg IntraVENous DAILY    naloxone (NARCAN) injection 0.2 mg  0.2 mg IntraVENous EVERY 2 MINUTES AS NEEDED     Patient has no known allergies. Social History     Socioeconomic History    Marital status:    Tobacco Use    Smoking status: Never Smoker    Smokeless tobacco: Never Used   Substance and Sexual Activity    Alcohol use: No    Drug use: No     Social History     Tobacco Use   Smoking Status Never Smoker   Smokeless Tobacco Never Used     Family History   Problem Relation Age of Onset    Cancer Mother         Lung    Arthritis-rheumatoid Father     Coronary Art Dis Father     Cancer Brother         Melanoma    Thyroid Cancer Neg Hx      ROS: The patient has no difficulty with chest pain or shortness of breath. No fever or chills. Comprehensive review of systems was otherwise unremarkable except as noted above. Physical Exam:   Visit Vitals  BP (!) 168/66 (BP 1 Location: Left upper arm, BP Patient Position: Sitting)   Pulse (!) 53   Temp 97.5 °F (36.4 °C)   Resp 18   Ht 5' 8\" (1.727 m)   Wt 178 lb (80.7 kg)   SpO2 99%   BMI 27.06 kg/m²     Vitals:    04/26/22 1923 04/26/22 2300 04/27/22 0420 04/27/22 0705   BP: (!) 162/77 (!) 154/76 (!) 144/82 (!) 168/66   Pulse: (!) 52 (!) 50 (!) 48 (!) 53   Resp: 18 16 18 18   Temp: 97.7 °F (36.5 °C) 97.8 °F (36.6 °C) 97.7 °F (36.5 °C) 97.5 °F (36.4 °C)   SpO2: 98% 96% 96% 99%   Weight:       Height:         No intake/output data recorded.   04/25 1901 - 04/27 0700  In: 1700 [I.V.:1700]  Out: 1000     Constitutional: Alert, oriented, cooperative patient in no acute distress; appears stated age    Eyes:Sclera are clear. EOMs intact  ENMT: no external lesions gross hearing normal; no obvious neck masses, no ear or lip lesions, nares normal, +NGT with bilious output  CV: RRR. Normal perfusion  Resp: No JVD. Breathing is  non-labored; no audible wheezing. GI: Inc OK; abd flat        Musculoskeletal: unremarkable with normal function. No embolic signs or cyanosis. Neuro:  Oriented; moves all 4; no focal deficits  Psychiatric: normal affect and mood, no memory impairment    Recent vitals (if inpt):  Patient Vitals for the past 24 hrs:   BP Temp Pulse Resp SpO2   04/27/22 0705 (!) 168/66 97.5 °F (36.4 °C) (!) 53 18 99 %   04/27/22 0420 (!) 144/82 97.7 °F (36.5 °C) (!) 48 18 96 %   04/26/22 2300 (!) 154/76 97.8 °F (36.6 °C) (!) 50 16 96 %   04/26/22 1923 (!) 162/77 97.7 °F (36.5 °C) (!) 52 18 98 %   04/26/22 1529 (!) 155/74 97.5 °F (36.4 °C) (!) 54 20 99 %   04/26/22 1117 (!) 146/70 97.7 °F (36.5 °C) (!) 55 20 97 %       Amount and/or Complexity of Data Reviewed and Analyzed:  I reviewed and analyzed all of the unique labs and radiologic studies that are shown below as well as any that are in the HPI, and any that are in the expanded problem list below  *Each unique test, order, or document contributes to the combination of 2 or combination of 3 in Category 1 below. For this visit I also reviewed old records and prior notes.       Recent Labs     04/27/22  0414   WBC 7.2   HGB 12.7*         K 4.0   *   CO2 26   BUN 19   CREA 1.05   *   TBILI 0.4   ALT 62   AP 83     Review of most recent CBC  Lab Results   Component Value Date/Time    WBC 7.2 04/27/2022 04:14 AM    HGB 12.7 (L) 04/27/2022 04:14 AM    HCT 37.0 (L) 04/27/2022 04:14 AM    PLATELET 047 08/71/1187 04:14 AM    MCV 94.4 04/27/2022 04:14 AM       Review of most recent BMP  Lab Results   Component Value Date/Time    Sodium 139 04/27/2022 04:14 AM    Potassium 4.0 2022 04:14 AM    Chloride 109 (H) 2022 04:14 AM    CO2 26 2022 04:14 AM    Anion gap 4 (L) 2022 04:14 AM    Glucose 102 (H) 2022 04:14 AM    BUN 19 2022 04:14 AM    Creatinine 1.05 2022 04:14 AM    GFR est AA >60 2022 04:14 AM    GFR est non-AA >60 2022 04:14 AM    Calcium 8.2 (L) 2022 04:14 AM       Review of most recent LFTs (and lipase if done)  Lab Results   Component Value Date/Time    ALT (SGPT) 62 2022 04:14 AM    AST (SGOT) 35 2022 04:14 AM    Alk. phosphatase 83 2022 04:14 AM    Bilirubin, total 0.4 2022 04:14 AM     Lab Results   Component Value Date/Time    Lipase 71 (L) 2022 05:08 PM       Lab Results   Component Value Date/Time    INR 1.0 2022 06:08 AM    Ammonia, plasma 31 2022 06:08 AM       Review of most recent HgbA1c  Lab Results   Component Value Date/Time    Hemoglobin A1c 5.6 2022 05:08 PM       Nutritional assessment screen for wound healing issues:  Lab Results   Component Value Date/Time    Protein, total 5.8 (L) 2022 04:14 AM    Albumin 2.4 (L) 2022 04:14 AM       @lastcovr@  XR Results (most recent):  Results from [place] encounter on 22    XR ABD (KUB)    Narrative  KUB    INDICATION: Nasogastric tube placement    A supine view of the abdomen was obtained. The tip of the nasogastric tube is  in the stomach. There is stable small bowel distention suggesting a small bowel  obstruction    Impression  1. Tip of the NG tube is in the stomach.   2.  Dilated small bowel concerning for small bowel obstruction      CT Results (most recent):  Results from Hospital Encounter encounter on 08    [address] [place] [address]    COMPUTED TOMOGRAPHY    NAME: Adam Wiley  PT : 1950               SEX: M         MR#: [id number]  LOCATION/NS: - AGE: 57Y      ACCT: [de-identified]  ORDERING: MERLENE LOVE            PT TYPE: O  RADIOLOGIST: Caio Ramos ([id number])  Final Report      ICD Codes / Adm. Diagnosis:                  /  Examination:  CT CARDIAC OVER READ  - [id number] - 2008  9:37AM      CT CHEST, CARDIAC CT OVER READ, 2008    Reason:    REPORT:    TECHNIQUE: Helically acquired images were obtained from just above the  aortic architectural to the lower hemithoraces reconstructed at 2.5 mm  intervals after intravenous contrast.  No prior studies are available for  comparison. FINDINGS: There is no pericardial effusion. No pleural fluid is identified  in the visualized portions of the thorax. Incidental note is made of the  origin of the left vertebral artery from the aortic arch. The heart and  great vessels are otherwise unremarkable. There are coronary artery  calcifications will be described in dedicated coronary artery CTA report. The visualized portions of the lungs are clear with the exception of a tiny  granuloma within the right lower lobe measuring 2 mm. No adenopathy is seen within the thorax. IMPRESSION:  1. TINY RIGHT LOWER LOBE GRANULOMA. 2. CORONARY ARTERY CALCIFICATIONS. Interpreting/Reading Doctor: Caio Ramos ([id number])  Transcribed: AGS on 2008  Approved: Caio Ramos ([id number])  2008        Distribution:  Attending Doctor: Clarissa Mcnair Results (most recent):  Results from [place] encounter on 08    [address]  [facility]    ULTRASOUND    NAME: Nima Schwartz  PT : 1950               SEX: M         MR#: [id number]  LOCATION/NS: US-                 AGE: 47P      ACCT: [de-identified]  ORDERING: MERLENE LOVE            PT TYPE: Rafael Cardenas [facility] ([id number])  Final Report      ICD Codes / Adm. Diagnosis:                  /  Examination:  CAROTID ULTRASOUND  - [id number] - Jul 23 2008  9:36AM      CAROTID ULTRASOUND, 07/23/08:    Reason:   Coronary artery disease, hypertension. 272.4. REPORT:  Right and left common, internal and external carotid artery peak  systolic velocities were all within normal limits and normal ratios were  demonstrated. Right carotid bifurcation demonstrates very mild soft plaque  within the proximal internal carotid artery without any significant  narrowing. Doppler waveform tracings are normal in the right common,  internal carotid arteries, as well as vertebral artery which demonstrate  antegrade flow. On the left, there is no suspicious atheromatous disease and normal Doppler  tracings are evident. Left vertebral artery demonstrates antegrade flow. IMPRESSION:    EXTREMELY MINIMAL ATHEROMATOUS DISEASE AT THE RIGHT CAROTID  BIFURCATION WITHOUT NARROWING.  OTHERWISE, NORMAL.     Interpreting/Reading Doctor: Anneliese Leonard ([id number])  Transcribed: CP on 07/23/2008  Approved: Anneliese Leonard ([id number])  07/23/2008        Distribution:  Attending Doctor: Omega Dillon        Admission date (for inpatients): 4/20/2022   * No surgery found *  Procedure(s) with comments:  LAPAROTOMY EXPLORATORY POSSIBLE BOWEL RESECTION - REDUCTION OF INTERNAL HERNIA        ASSESSMENT/PLAN:  Problem List  Date Reviewed: 12/14/2021          Codes Class Noted    On total parenteral nutrition (TPN) ICD-10-CM: Z78.9  ICD-9-CM: V49.89  4/26/2022        Status post exploratory laparotomy ICD-10-CM: L94.380  ICD-9-CM: V45.89  4/26/2022        Acute kidney injury (GINNY) with acute tubular necrosis (ATN) ([facility] ICD-10-CM: N17.0  ICD-9-CM: 584.5  4/21/2022        * (Principal) SBO (small bowel obstruction) ([facility] ICD-10-CM: X18.898  ICD-9-CM: 560.9  4/20/2022    Overview Addendum 4/21/2022  1:49 PM by Vivi Raines MD     4/21/22 s/p expl lap and reduction of internal hernia for high grade SBO; Dr Lili Bartlett             Postsurgical hypothyroidism ICD-10-CM: E89.0  ICD-9-CM: 244.0  Unknown        Papillary thyroid carcinoma, multifocal (3 tumors in the left lobe, largest tumor 0.9 cm), status post thyroidectomy/central lymph node dissection 5/26/2015 and 54.5 mCi iodine-131 7/22/2015 ICD-10-CM: C73  ICD-9-CM: 669  6/21/2016        GERD (gastroesophageal reflux disease) ICD-10-CM: K21.9  ICD-9-CM: 530.81  Unknown        Malignant melanoma ([facility] ICD-10-CM: C43.9  ICD-9-CM: 172.9  Unknown        Arthritis ICD-10-CM: M19.90  ICD-9-CM: 716.90  Unknown        Chronic coronary artery disease ICD-10-CM: I25.10  ICD-9-CM: 414.00  9/3/2015    Overview Signed 6/21/2016  8:29 AM by Benjamin JOHNS     Overview:   BMS RCA 2008; NORMAL STRESS TEST 2014    Last Assessment & Plan:   He does not have any anginal quality chest pain. He occasionally has sharp atypical pain. His stress test was normal less than a year ago. I don't plan any further investigation. Dyslipidemia ICD-10-CM: E78.5  ICD-9-CM: 272.4  9/3/2015    Overview Signed 6/21/2016  8:29 AM by Benjamin JOHNS     Overview:   STATIN INTOLERANT    Last Assessment & Plan:   He is statin intolerant but he is unable to tolerate red yeast rice and his total cholesterol is actually less than 200. We discussed the injectables but I don't think he is an appropriate candidate for that right now. He is doing pretty well just on Red yeast rice             Hypertension ICD-10-CM: I10  ICD-9-CM: 401.9  9/3/2015    Overview Signed 6/21/2016  8:29 AM by Dominga Miller. Paul Condon 58:   Blood pressure is adequately controlled.              Dysphonia ICD-10-CM: R49.0  ICD-9-CM: 784.42  8/28/2015            Principal Problem:    SBO (small bowel obstruction) ([facility] (4/20/2022)      Overview: 4/21/22 s/p expl lap and reduction of internal hernia for high grade SBO;       Dr Karin Caren    Active Problems:    Chronic coronary artery disease (9/3/2015)      Overview: Overview:       BMS RCA 2008; NORMAL STRESS TEST 2014 Last Assessment & Plan:       He does not have any anginal quality chest pain. He occasionally has sharp       atypical pain. His stress test was normal less than a year ago. I don't       plan any further investigation. Papillary thyroid carcinoma, multifocal (3 tumors in the left lobe, largest tumor 0.9 cm), status post thyroidectomy/central lymph node dissection 5/26/2015 and 54.5 mCi iodine-131 7/22/2015 (6/21/2016)      GERD (gastroesophageal reflux disease) ()      Postsurgical hypothyroidism ()      Acute kidney injury (GINNY) with acute tubular necrosis (ATN) ([facility] (4/21/2022)      On total parenteral nutrition (TPN) (4/26/2022)      Status post exploratory laparotomy (4/26/2022)           Number and Complexity of Problems addressed and   Risks of complications and/or morbidity of management          High-grade SBO with leukocytosis refractory to NGT decompression  He is s/p expl laparotomy and reduction of internal hernia for high grade SBO on 4/21/22. NPO/IVF  +flatus-->NGT out today (4/27/22)    AF  WBC remains normal;  Leukocytosis resolved as of 4/22/22 (POD1)  Lactic acid is normal  Continue OOB qdya  Possible clears tomorrow    LFTs suddenly bumped the morning of surgery  Subsequent LFTs were normal              Level of MDM (2/3 elements below)  Number and Complexity of Problems Addressed Amount and/or Complexity of Data to be Reviewed and Analyzed  *Each unique test, order, or document contributes to the combination of 2 or combination of 3 in Category 1 below.  Risk of Complications and/or Morbidity or Mortality of pt Management     61521  30135 SF Minimal  1self-limited or minor problem Minimal or none Minimal risk of morbidity from additional diagnostic testing or Rx   88584  73132 Low Low  2or more self-limited or minor problems;    or  1stable chronic illness;    or  0HPAOQ, uncomplicated illness or injury   Limited  (Must meet the requirements of at least 1 of the 2 categories)  Category 1: Tests and documents   Any combination of 2 from the following:  Review of prior external note(s) from each unique source*;  review of the result(s) of each unique test*;   ordering of each unique test*    or   Category 2: Assessment requiring an independent historian(s)  (For the categories of independent interpretation of tests and discussion of management or test interpretation, see moderate or high) Low risk of morbidity from additional diagnostic testing or treatment     20275  53574 Mod Moderate  1or more chronic illnesses with exacerbation, progression, or side effects of treatment;    or  2or more stable chronic illnesses;    or  1undiagnosed new problem with uncertain prognosis;    or  1acute illness with systemic symptoms;    or  5NSPYO complicated injury   Moderate  (Must meet the requirements of at least 1 out of 3 categories)  Category 1: Tests, documents, or independent historian(s)  Any combination of 3 from the following:   Review of prior external note(s) from each unique source*;  Review of the result(s) of each unique test*;  Ordering of each unique test*;  Assessment requiring an independent historian(s)    or  Category 2: Independent interpretation of tests   Independent interpretation of a test performed by another physician/other qualified health care professional (not separately reported);     or  Category 3: Discussion of management or test interpretation  Discussion of management or test interpretation with external physician/other qualified health care professional/appropriate source (not separately reported)   Moderate risk of morbidity from additional diagnostic testing or treatment  Examples only:  Prescription drug management   Decision regarding minor surgery with identified patient or procedure risk factors  Decision regarding elective major surgery without identified patient or procedure risk factors   Diagnosis or treatment significantly limited by social determinants of health       39720  04842 High High  1or more chronic illnesses with severe exacerbation, progression, or side effects of treatment;    or  1 acute or chronic illness or injury that poses a threat to life or bodily function   Extensive  (Must meet the requirements of at least 2 out of 3 categories)  Category 1: Tests, documents, or independent historian(s)  Any combination of 3 from the following:   Review of prior external note(s) from each unique source*;  Review of the result(s) of each unique test*;   Ordering of each unique test*;   Assessment requiring an independent historian(s)    or   Category 2: Independent interpretation of tests   Independent interpretation of a test performed by another physician/other qualified health care professional (not separately reported);     or  Category 3: Discussion of management or test interpretation  Discussion of management or test interpretation with external physician/other qualified health care professional/appropriate source (not separately reported)   High risk of morbidity from additional diagnostic testing or treatment  Examples only:  Drug therapy requiring intensive monitoring for toxicity  Decision regarding elective major surgery with identified patient or procedure risk factors  Decision regarding emergency major surgery  Decision regarding hospitalization  Decision not to resuscitate or to de-escalate care because of poor prognosis             I have personally performed a face-to-face diagnostic evaluation and management  service on this patient. I have independently seen the patient. I have independently obtained the above history from the patient/family. I have independently examined the patient with above findings. I have independently reviewed data/labs for this patient and developed the above plan of care (MDM).   Signed: Livia Jack NP

## 2022-04-27 NOTE — PROGRESS NOTES
Comprehensive Nutrition Assessment    Type and Reason for Visit: Reassess  TPN Management (Hospitalist)    Nutrition Recommendations/Plan:   Parenteral Nutrition:  Peripheral parenteral nutrition new bag to begin at 1800  Continue: Dex 5%, 4.25% AA 2.4 L (100ml/hr)   Continue 250 ml 20% lipids daily  To provide: 1316 kcal/d (80% of needs), 102 grams of protein/d (100% of needs), 408 grams of CHO/d and 2650 ml of total volume/d. Lytes/L:   Sodium 50 meq (50 meq NaCl), Potassium 30 meq (20 meq KCL,10 meq KPO4), 5 meq Mg,  Calcium-none. Osmolarity 827  Other additives: MTE, MVI MWF due to national shortages,  100 mg Thiamine(day 3 of 4)  Nutritional Supplement Therapy:   Active electrolyte replacement per nutrition support protocols  Replacement indicated:  None  Labs:   BMP daily  Mg MWF  Phos MWF    POC Glucoses/SSI Not indicated        Malnutrition Assessment:  Malnutrition Status: At risk for malnutrition (specify) (NPO X 6 days)    Nutrition Assessment:   Nutrition History:    4/24: Called and spoke with wife as nutrition assessment was completed remotely from [facility]. She reports no changes to PO intake until acutely just prior to admission. She states that patient typically eats lunch and dinner. Noted from previous nutrition note, patient with intentional weight loss PTA. Wife confirms. 4/25: Wife notes that tomorrow will be 1 week of no po intake. Nutrition Background:   Patient with PMH significant for GERD with Nissen fundoplication, thyroid cancer s/p XRT, HTN, CAD. He presented with epigastric pain. He had outpatient CT with Moira showing high grade SBO, but wanted to be admitted to [address] [place] [address] so he drove here. He is now s/p lap with reduction of internal hernia 4/21. Small bowel was trapped in hernia and was ischemic but not resected. Discussed with Kimberlee Weber RN. Pt has 2 intact PIV. Nutrition Interval:  JOHNSON: 450 ml over last 24 hrs.  Remains PPN dependent with continued NPO status d/t post-op ileus. Pt seen in company of wife and friends. Pt reports he passed flatus while walking in the [place] this morning. Abdominal Status (last documented): Tender,Semi-soft abdomen with Hypoactive  bowel sounds. Last BM  . Pertinent Medications:  Pepcid 20 mg/d, Thiamine 100 mg x3 doses (last dose 4/24), Zosyn TID  Pertinent Labs:   Lab Results   Component Value Date/Time    Sodium 139 04/27/2022 04:14 AM    Potassium 4.0 04/27/2022 04:14 AM    Chloride 109 (H) 04/27/2022 04:14 AM    CO2 26 04/27/2022 04:14 AM    Anion gap 4 (L) 04/27/2022 04:14 AM    Glucose 102 (H) 04/27/2022 04:14 AM    BUN 19 04/27/2022 04:14 AM    Creatinine 1.05 04/27/2022 04:14 AM    Calcium 8.2 (L) 04/27/2022 04:14 AM    Albumin 2.4 (L) 04/27/2022 04:14 AM    Magnesium 2.2 04/27/2022 04:14 AM    Phosphorus 2.8 04/27/2022 04:14 AM       Labs remarkable for K trending up, NaCl trending down. Zosyn provides significant NaCl  NaCl decreased in PPN from 80 meq/L to 50 meq/L 4/25. Current PPN provides 72 meq K/d. Phos-WDL  Nutrition Related Findings:   4/25: No visible fat or muscle wasting, 4/24: PPN started      Current Nutrition Therapies:  DIET NPO   PPN: Dex 5%, 4.25% AA 2.4 L (100ml/hr), 250 ml 20% lipids daily . To provide: 1316 kcal/d (80% of needs), 102 grams of protein/d (100% of needs), 408 grams of CHO/d and 2650 ml of total volume/d. Current Intake:   Average Meal Intake: NPO        Anthropometric Measures:  Height: 5' 8\" (172.7 cm)  Current Body Wt: 80.7 kg (177 lb 14.6 oz) (4/20), Weight source: Stated  BMI: 27.1, Overweight (BMI 25.0-29. 9)  Admission Body Weight: 177 lb 14.6 oz  Ideal Body Weight (lbs) (Calculated): 154 lbs (70 kg), 115.5 %  Usual Body Wt: 80.7 kg (178 lb) (last office weight 2/2022), Percent weight change: 0       Edema: No data recorded  Estimated Daily Nutrient Needs:  Energy (kcal/day): 8926-7569 (Kcal/kg (20-25) Weight used: Current (80.7 kg (4/20))    Protein (g/day):  (1.2-1.3 g/kg s/p surgery) Weight Used: (Current) 80.7 kg  Fluid (ml/day):   (1 ml/kcal)    Nutrition Diagnosis:   · Inadequate oral intake related to altered GI function as evidenced by NPO or clear liquid status due to medical condition (post-op ileus)    Nutrition Interventions:   Food and/or Nutrient Delivery: Continue NPO,Continue parenteral nutrition  Coordination of Nutrition Care: Continue to monitor while inpatient,Interdisciplinary rounds   Discussed with Kacie Galvan RN. RN reports no concerns with current 2 PIV sites. Goals:   Previous Goal Met: Goal(s) achieved  Active Goal: Initiate PO diet,within 2 days       Nutrition Monitoring and Evaluation:      Food/Nutrient Intake Outcomes: Diet advancement/tolerance,Parenteral nutrition intake/tolerance  Physical Signs/Symptoms Outcomes: Biochemical data,GI status,Fluid status or edema,Weight    Discharge Planning:     Too soon to determine    Noé Reveal, [address], [address] [place] [address], [address] [place] [address]

## 2022-04-28 LAB
ANION GAP SERPL CALC-SCNC: 5 MMOL/L (ref 7–16)
BUN SERPL-MCNC: 19 MG/DL (ref 8–23)
CALCIUM SERPL-MCNC: 8.9 MG/DL (ref 8.3–10.4)
CHLORIDE SERPL-SCNC: 107 MMOL/L (ref 98–107)
CO2 SERPL-SCNC: 28 MMOL/L (ref 21–32)
CREAT SERPL-MCNC: 1.28 MG/DL (ref 0.8–1.5)
ERYTHROCYTE [DISTWIDTH] IN BLOOD BY AUTOMATED COUNT: 13.2 % (ref 11.9–14.6)
GLUCOSE SERPL-MCNC: 107 MG/DL (ref 65–100)
HCT VFR BLD AUTO: 39.2 % (ref 41.1–50.3)
HGB BLD-MCNC: 13.3 G/DL (ref 13.6–17.2)
MCH RBC QN AUTO: 32.2 PG (ref 26.1–32.9)
MCHC RBC AUTO-ENTMCNC: 33.9 G/DL (ref 31.4–35)
MCV RBC AUTO: 94.9 FL (ref 79.6–97.8)
NRBC # BLD: 0 K/UL (ref 0–0.2)
PLATELET # BLD AUTO: 338 K/UL (ref 150–450)
PMV BLD AUTO: 8.5 FL (ref 9.4–12.3)
POTASSIUM SERPL-SCNC: 4.7 MMOL/L (ref 3.5–5.1)
RBC # BLD AUTO: 4.13 M/UL (ref 4.23–5.6)
SODIUM SERPL-SCNC: 140 MMOL/L (ref 136–145)
WBC # BLD AUTO: 7.8 K/UL (ref 4.3–11.1)

## 2022-04-28 PROCEDURE — 85027 COMPLETE CBC AUTOMATED: CPT

## 2022-04-28 PROCEDURE — 74011250637 HC RX REV CODE- 250/637: Performed by: SURGERY

## 2022-04-28 PROCEDURE — 74011250636 HC RX REV CODE- 250/636: Performed by: FAMILY MEDICINE

## 2022-04-28 PROCEDURE — 36415 COLL VENOUS BLD VENIPUNCTURE: CPT

## 2022-04-28 PROCEDURE — 74011000258 HC RX REV CODE- 258: Performed by: SURGERY

## 2022-04-28 PROCEDURE — 80048 BASIC METABOLIC PNL TOTAL CA: CPT

## 2022-04-28 PROCEDURE — 74011250637 HC RX REV CODE- 250/637: Performed by: FAMILY MEDICINE

## 2022-04-28 PROCEDURE — C9113 INJ PANTOPRAZOLE SODIUM, VIA: HCPCS | Performed by: FAMILY MEDICINE

## 2022-04-28 PROCEDURE — 74011000250 HC RX REV CODE- 250: Performed by: SURGERY

## 2022-04-28 PROCEDURE — 74011250636 HC RX REV CODE- 250/636: Performed by: SURGERY

## 2022-04-28 PROCEDURE — 65270000029 HC RM PRIVATE

## 2022-04-28 PROCEDURE — 74011000250 HC RX REV CODE- 250: Performed by: FAMILY MEDICINE

## 2022-04-28 RX ORDER — LEVOTHYROXINE SODIUM 125 UG/1
125 TABLET ORAL
Status: DISCONTINUED | OUTPATIENT
Start: 2022-04-29 | End: 2022-04-28 | Stop reason: SDUPTHER

## 2022-04-28 RX ORDER — LEVOTHYROXINE SODIUM 100 UG/1
100 TABLET ORAL
Status: DISCONTINUED | OUTPATIENT
Start: 2022-04-29 | End: 2022-04-28

## 2022-04-28 RX ORDER — HYDROCODONE BITARTRATE AND ACETAMINOPHEN 7.5; 325 MG/1; MG/1
1 TABLET ORAL
Status: DISCONTINUED | OUTPATIENT
Start: 2022-04-28 | End: 2022-04-29 | Stop reason: HOSPADM

## 2022-04-28 RX ORDER — OXYCODONE HYDROCHLORIDE 5 MG/1
10 TABLET ORAL
Status: DISCONTINUED | OUTPATIENT
Start: 2022-04-28 | End: 2022-04-29 | Stop reason: HOSPADM

## 2022-04-28 RX ORDER — PANTOPRAZOLE SODIUM 40 MG/1
40 TABLET, DELAYED RELEASE ORAL
Status: DISCONTINUED | OUTPATIENT
Start: 2022-04-29 | End: 2022-04-29 | Stop reason: HOSPADM

## 2022-04-28 RX ADMIN — SODIUM CHLORIDE, PRESERVATIVE FREE 5 ML: 5 INJECTION INTRAVENOUS at 21:30

## 2022-04-28 RX ADMIN — PIPERACILLIN AND TAZOBACTAM 3.38 G: 3; .375 INJECTION, POWDER, LYOPHILIZED, FOR SOLUTION INTRAVENOUS at 17:52

## 2022-04-28 RX ADMIN — HYDROMORPHONE HYDROCHLORIDE 0.5 MG: 1 INJECTION, SOLUTION INTRAMUSCULAR; INTRAVENOUS; SUBCUTANEOUS at 08:32

## 2022-04-28 RX ADMIN — PIPERACILLIN AND TAZOBACTAM 3.38 G: 3; .375 INJECTION, POWDER, LYOPHILIZED, FOR SOLUTION INTRAVENOUS at 08:32

## 2022-04-28 RX ADMIN — SODIUM CHLORIDE, PRESERVATIVE FREE 40 MG: 5 INJECTION INTRAVENOUS at 08:32

## 2022-04-28 RX ADMIN — PIPERACILLIN AND TAZOBACTAM 3.38 G: 3; .375 INJECTION, POWDER, LYOPHILIZED, FOR SOLUTION INTRAVENOUS at 00:07

## 2022-04-28 RX ADMIN — OXYCODONE HYDROCHLORIDE 10 MG: 5 TABLET ORAL at 14:57

## 2022-04-28 RX ADMIN — LEVOTHYROXINE SODIUM 137 MCG: 0.11 TABLET ORAL at 14:57

## 2022-04-28 RX ADMIN — AMLODIPINE BESYLATE 2.5 MG: 2.5 TABLET ORAL at 08:34

## 2022-04-28 RX ADMIN — SODIUM CHLORIDE, PRESERVATIVE FREE 10 ML: 5 INJECTION INTRAVENOUS at 05:20

## 2022-04-28 NOTE — PROGRESS NOTES
Comprehensive Nutrition Assessment    Type and Reason for Visit: Reassess  TPN Management (Hospitalist)    Nutrition Recommendations/Plan:   Parenteral Nutrition: Discontinue at 56 tonight. Meals and Snacks:  Continue current diet. CLQ, await advancement per surgery. Malnutrition Assessment:  Malnutrition Status: At risk for malnutrition (specify) (NPO X 6 days)    Nutrition Assessment:   Nutrition History:    4/24: Called and spoke with wife as nutrition assessment was completed remotely from [facility]. She reports no changes to PO intake until acutely just prior to admission. She states that patient typically eats lunch and dinner. Noted from previous nutrition note, patient with intentional weight loss PTA. Wife confirms. 4/25: Wife notes that tomorrow will be 1 week of no po intake. Nutrition Background:   Patient with PMH significant for GERD with Nissen fundoplication, thyroid cancer s/p XRT, HTN, CAD. He presented with epigastric pain. He had outpatient CT with Moira showing high grade SBO, but wanted to be admitted to [facility] so he drove here. He is now s/p lap with reduction of internal hernia 4/21. Small bowel was trapped in hernia and was ischemic but not resected. Discussed with Rajinder Taylor RN. Pt has 2 intact PIV. Nutrition Interval:  NGT removed 4/27 and CLQ diet started this AM with noted potential plan for discharge tomorrow on liquid diet. Pt seen in company of wife. Pt reports he continues to pass flatus but no BM yet. He has not been hungry yet but has been thirsty and consumed 100% of clear liquid breakfast slowly this morning. He denies any nausea, bloating or fullness. Wife inquires about what he should eat at home and reports Dr. Lashell Campos suggested soft diet. Wife requesting clarification on what that includes. Abdominal Status (last documented): Passing flatus,Semi-soft,Tender abdomen with Hypoactive  bowel sounds. Last BM  .   Pertinent Medications:  Protonix, Zosyn    Nutrition Related Findings:   4/25: No visible fat or muscle wasting, 4/24: PPN started      Current Nutrition Therapies:  ADULT DIET Clear Liquid   PPN: Dex 5%, 4.25% AA 2.4 L (100ml/hr), 250 ml 20% lipids daily . To provide: 1316 kcal/d (80% of needs), 102 grams of protein/d (100% of needs), 408 grams of CHO/d and 2650 ml of total volume/d. Current Intake:   Average Meal Intake:  (Clear liquid diet is nutritionally inadequate)        Anthropometric Measures:  Height: 5' 8\" (172.7 cm)  Current Body Wt: 80.7 kg (177 lb 14.6 oz) (4/20), Weight source: Stated  BMI: 27.1, Overweight (BMI 25.0-29. 9)  Admission Body Weight: 177 lb 14.6 oz  Ideal Body Weight (lbs) (Calculated): 154 lbs (70 kg), 115.5 %  Usual Body Wt: 80.7 kg (178 lb) (last office weight 2/2022), Percent weight change: 0       Edema: No data recorded  Estimated Daily Nutrient Needs:  Energy (kcal/day): 1800-4035 (Kcal/kg (20-25) Weight used: Current (80.7 kg (4/20))    Protein (g/day):  (1.2-1.3 g/kg s/p surgery) Weight Used: (Current) 80.7 kg  Fluid (ml/day):   (1 ml/kcal)    Nutrition Diagnosis:   · Inadequate oral intake related to altered GI function as evidenced by NPO or clear liquid status due to medical condition    Nutrition Interventions:   Food and/or Nutrient Delivery: Continue current diet,Discontinue parenteral nutrition  Coordination of Nutrition Care: Continue to monitor while inpatient,Interdisciplinary rounds   Education:Discussed typical post-op diet advancement with pt and wife emphasizing to follow MD specific instructions on when to advance. Provided handouts on full liquid diet, GI soft diet and tips fir diet progression. Instructed on need for ONS 2 times per day once on full liquid diet and to continue until po intake returned to baseline. Discussed with Hua Kiran RN.   Goals:   Previous Goal Met: Goal(s) achieved  Active Goal: PO intake 50% or greater,within 2 days       Nutrition Monitoring and Evaluation:      Food/Nutrient Intake Outcomes: Diet advancement/tolerance  Physical Signs/Symptoms Outcomes: GI status    Discharge Planning:    Continue current diet,Continue oral nutrition supplement    Bernard Solares, [address] [place] [address], , [address] [facility]

## 2022-04-28 NOTE — PROGRESS NOTES
H&P/Consult Note/Progress Note/Office Note:   Tamanna Hutchison MRN: [id number]  [id number]  Age:71 y.o.    HPI: Tamanna Hutchison is a 80 y.o. male who is s/p expl laparotomy and reduction of internal hernia for high grade SBO on 22. Prior to surgery he was admitted by the hospitalist after he came to the ER on 22 with a 2-day history of diffuse, constant, progressive, severe abdominal pain. Nothing in particular made his pain better or worse. He had associated nausea but no vomiting  No recent abdominal trauma reported  He is s/p Nissen fundoplication performed in  by Dr. Martin Ascencio at an outside hospital and reports he has been unable to vomit since then. He is s/p laparoscopic cholecystectomy  by Dr. Martin Ascencio  He is s/p open appendectomy . He is s/p colonoscopy in 2019 and reports this was normal with his next planned in 5 years. He had leukocytosis in the ER and a CT scan as shown below with a high-grade small bowel obstruction. An NG tube was placed to decompression and he was given IV fluids and bowel rest without improvement in his abdominal pain  Surgery was consulted by Dr Dali Bess and Dr Criss Sánchez      22 CT abd/pelvis with oral and IV contrast  LOWER CHEST: Unremarkable. ABDOMEN AND PELVIS:   Liver: Unremarkable. Biliary system: Cholecystectomy.     Pancreas: Unremarkable. Spleen: Unremarkable. Adrenals: Unremarkable.     Genitourinary: Symmetric renal enhancement. No hydronephrosis or ureteral calculus. Urinary bladder decompressed. Reproductive organs: Prostate gland is enlarged. Gastrointestinal tract: Multiple dilated loops of small bowel present throughout the abdomen. There is abrupt transition to decompressed bowel involving the proximal ileum within the right lower quadrant (3, 62). Peritoneum/Extraperitoneum: Unremarkable. Small amount of abdominopelvic ascites.      Vascular: Unremarkable   Musculoskeletal:  Degenerative changes of thoracolumbar spine. No acute or aggressive osseous lesion. IMPRESSION:   Findings consistent with high-grade small bowel obstruction with transition point within the right upper quadrant. 4/21/22 abd US  Hx: Elevated LFTs      Findings: The liver is normal in size measuring 14.7 cm. No intrahepatic biliary ductal dilitation is seen. The liver appears heterogeneous and some contour nodularity is suggested. These can suggest cirrhosis. The main portal vein is patent and demonstrates hepatopedal flow.     The gallbladder has been removed. The common bile duct is normal in caliber measuring 3.2 mm.     The pancreas is not seen due to overlying bowel gas and therefore cannot be assessed.     The right kidney measures 10.4 cm in length. No shadowing stones, or hydronephrosis is seen of the right kidney. Only increased parenchymal echogenicity is seen suggested chronic medical renal changes.     The IVC is patent. The distal abdominal aorta is normal in caliber measuring 1.4cm. The non-visualized portions of the aorta cannot be evaluated. Small upper abdominal ascites is seen.     IMPRESSION  1. Heterogeneous liver with some questionable contour nodularity. These can be indicated to cirrhosis. 2.  No intra- or extrahepatic biliary ductal dilitation. 3.  Patent portal vein and IVC. Flow in the portal vein is hepatopedal in direction.    4. Small upper abdominal ascites.          additionl hx:  4/22/22 POD1 AF/VSS NGT; Follow LFTs  4/23/22 POD2 no new complaints; no flatus, wbc normal; Hepatitis panel negative  4/24/22 POD3 AF, normal WBC; repeat LFTs normal Toradol added for pain  4/25/22 POD4 elevated BP, still with sme abd pain, no flatus; AF, normal WBC  4/26/22 POD5 AF/VSS, WBC normal; no flatus yet  4/27/22 POD6 AF/VSS, up in a chair, comfortable, WBC nml, lactic acid normal; +flatus; NGT out today   4/28/22 POD7 AF/VSS +flatus; start clears today                  Past Medical History:   Diagnosis Date    Arthritis     Chronic coronary artery disease     Gastroesophageal reflux disease     Gout     Hypercholesterolemia     Hypertension     Malignant melanoma     Papillary thyroid carcinoma, multifocal (3 tumors in the left lobe, largest tumor 0.9 cm), status post thyroidectomy/central lymph node dissection 5/26/2015 and 54.5 mCi iodine-131 7/22/2015     Postsurgical hypothyroidism      Past Surgical History:   Procedure Laterality Date    HX APPENDECTOMY  1969    HX CHOLECYSTECTOMY  2005    HX CORONARY ARTERY BYPASS GRAFT  08/19/2016    Dr. Giuliana Luna at [address] [facility]  2010    HX GI  2008    Nissen fundoplication    HX KNEE ARTHROSCOPY Right 2005    HX MALIGNANT SKIN LESION EXCISION  2011    melanoma removed from ear lobe    HX THYROIDECTOMY  5/26/2015    including central lymph node dissection    HX TONSILLECTOMY  Age 4     Current Facility-Administered Medications   Medication Dose Route Frequency    amLODIPine (NORVASC) tablet 2.5 mg  2.5 mg Oral DAILY    TPN ADULT - PERIPHERAL - dextrose 5% amino acid 4.25%    IntraVENous QPM    pantoprazole (PROTONIX) 40 mg in 0.9% sodium chloride 10 mL injection  40 mg IntraVENous Q12H    labetaloL (NORMODYNE;TRANDATE) injection 20 mg  20 mg IntraVENous Q4H PRN    fat emulsion 20% (LIPOSYN, INTRAlipid) infusion 250 mL  250 mL IntraVENous QPM    levothyroxine (SYNTHROID) injection 100 mcg  100 mcg IntraVENous Q24H    carboxymethylcellulose sodium (REFRESH LIQUIGEL) 1 % ophthalmic solution 1 Drop  1 Drop Both Eyes PRN    NUTRITIONAL SUPPORT ELECTROLYTE PRN ORDERS   Does Not Apply PRN    HYDROmorphone (DILAUDID) injection 0.5 mg  0.5 mg IntraVENous Q3H PRN    Or    HYDROmorphone (DILAUDID) injection 1 mg  1 mg IntraVENous Q3H PRN    phenol throat spray (CHLORASEPTIC) 1 Spray  1 Spray Oral PRN    sodium chloride (NS) flush 5-40 mL  5-40 mL IntraVENous Q8H    sodium chloride (NS) flush 5-40 mL  5-40 mL IntraVENous PRN    acetaminophen (TYLENOL) tablet 650 mg  650 mg Oral Q6H PRN    ondansetron (ZOFRAN) injection 4 mg  4 mg IntraVENous Q6H PRN    piperacillin-tazobactam (ZOSYN) 3.375 g in 0.9% sodium chloride (MBP/ADV) 100 mL MBP  3.375 g IntraVENous Q8H    glucose chewable tablet 16 g  16 g Oral PRN    glucagon (GLUCAGEN) injection 1 mg  1 mg IntraMUSCular PRN    dextrose 10% infusion 125-250 mL  125-250 mL IntraVENous PRN    naloxone (NARCAN) injection 0.2 mg  0.2 mg IntraVENous EVERY 2 MINUTES AS NEEDED     Patient has no known allergies. Social History     Socioeconomic History    Marital status:    Tobacco Use    Smoking status: Never Smoker    Smokeless tobacco: Never Used   Substance and Sexual Activity    Alcohol use: No    Drug use: No     Social History     Tobacco Use   Smoking Status Never Smoker   Smokeless Tobacco Never Used     Family History   Problem Relation Age of Onset    Cancer Mother         Lung    Arthritis-rheumatoid Father     Coronary Art Dis Father     Cancer Brother         Melanoma    Thyroid Cancer Neg Hx      ROS: The patient has no difficulty with chest pain or shortness of breath. No fever or chills. Comprehensive review of systems was otherwise unremarkable except as noted above. Physical Exam:   Visit Vitals  BP (!) 152/73 (BP 1 Location: Right upper arm, BP Patient Position: Sitting)   Pulse (!) 55   Temp 98.2 °F (36.8 °C)   Resp 16   Ht 5' 8\" (1.727 m)   Wt 178 lb (80.7 kg)   SpO2 97%   BMI 27.06 kg/m²     Vitals:    04/27/22 1632 04/27/22 1934 04/27/22 2347 04/28/22 0404   BP: (!) 169/76 (!) 150/77 138/74 (!) 152/73   Pulse: 60 (!) 57 64 (!) 55   Resp: 18 16 14 16   Temp: 97.8 °F (36.6 °C) 98 °F (36.7 °C) 98.2 °F (36.8 °C) 98.2 °F (36.8 °C)   SpO2: 100% 100% 99% 97%   Weight:       Height:         No intake/output data recorded.   04/26 1901 - 04/28 0700  In: -   Out: 850     Constitutional: Alert, oriented, cooperative patient in no acute distress; appears stated age    Eyes:Sclera are clear. EOMs intact  ENMT: no external lesions gross hearing normal; no obvious neck masses, no ear or lip lesions, nares normal, +NGT with bilious output  CV: RRR. Normal perfusion  Resp: No JVD. Breathing is  non-labored; no audible wheezing. GI: Inc OK; abd flat        Musculoskeletal: unremarkable with normal function. No embolic signs or cyanosis. Neuro:  Oriented; moves all 4; no focal deficits  Psychiatric: normal affect and mood, no memory impairment    Recent vitals (if inpt):  Patient Vitals for the past 24 hrs:   BP Temp Pulse Resp SpO2   04/28/22 0404 (!) 152/73 98.2 °F (36.8 °C) (!) 55 16 97 %   04/27/22 2347 138/74 98.2 °F (36.8 °C) 64 14 99 %   04/27/22 1934 (!) 150/77 98 °F (36.7 °C) (!) 57 16 100 %   04/27/22 1632 (!) 169/76 97.8 °F (36.6 °C) 60 18 100 %   04/27/22 1122 (!) 155/75 98.4 °F (36.9 °C) (!) 55 17 97 %   04/27/22 0705 (!) 168/66 97.5 °F (36.4 °C) (!) 53 18 99 %       Amount and/or Complexity of Data Reviewed and Analyzed:  I reviewed and analyzed all of the unique labs and radiologic studies that are shown below as well as any that are in the HPI, and any that are in the expanded problem list below  *Each unique test, order, or document contributes to the combination of 2 or combination of 3 in Category 1 below. For this visit I also reviewed old records and prior notes. Recent Labs     04/28/22  0624 04/27/22  1445 04/27/22  0414   WBC 7.8  --  7.2   HGB 13.3*   < > 12.7*     --  292   NA  --   --  139   K  --   --  4.0   CL  --   --  109*   CO2  --   --  26   BUN  --   --  19   CREA  --   --  1.05   GLU  --   --  102*   TBILI  --   --  0.4   ALT  --   --  62   AP  --   --  83    < > = values in this interval not displayed.      Review of most recent CBC  Lab Results   Component Value Date/Time    WBC 7.8 04/28/2022 06:24 AM    HGB 13.3 (L) 04/28/2022 06:24 AM    HCT 39.2 (L) 04/28/2022 06:24 AM    PLATELET 815 01/51/7955 06:24 AM    MCV 94.9 04/28/2022 06:24 AM       Review of most recent BMP  Lab Results   Component Value Date/Time    Sodium 139 04/27/2022 04:14 AM    Potassium 4.0 04/27/2022 04:14 AM    Chloride 109 (H) 04/27/2022 04:14 AM    CO2 26 04/27/2022 04:14 AM    Anion gap 4 (L) 04/27/2022 04:14 AM    Glucose 102 (H) 04/27/2022 04:14 AM    BUN 19 04/27/2022 04:14 AM    Creatinine 1.05 04/27/2022 04:14 AM    GFR est AA >60 04/27/2022 04:14 AM    GFR est non-AA >60 04/27/2022 04:14 AM    Calcium 8.2 (L) 04/27/2022 04:14 AM       Review of most recent LFTs (and lipase if done)  Lab Results   Component Value Date/Time    ALT (SGPT) 62 04/27/2022 04:14 AM    AST (SGOT) 35 04/27/2022 04:14 AM    Alk. phosphatase 83 04/27/2022 04:14 AM    Bilirubin, total 0.4 04/27/2022 04:14 AM     Lab Results   Component Value Date/Time    Lipase 71 (L) 04/20/2022 05:08 PM       Lab Results   Component Value Date/Time    INR 1.0 04/23/2022 06:08 AM    Ammonia, plasma 31 04/23/2022 06:08 AM       Review of most recent HgbA1c  Lab Results   Component Value Date/Time    Hemoglobin A1c 5.6 04/20/2022 05:08 PM       Nutritional assessment screen for wound healing issues:  Lab Results   Component Value Date/Time    Protein, total 5.8 (L) 04/27/2022 04:14 AM    Albumin 2.4 (L) 04/27/2022 04:14 AM       @lastcovr@  XR Results (most recent):  Results from [place] encounter on 04/20/22    XR ABD (KUB)    Narrative  KUB    INDICATION: Nasogastric tube placement    A supine view of the abdomen was obtained. The tip of the nasogastric tube is  in the stomach. There is stable small bowel distention suggesting a small bowel  obstruction    Impression  1. Tip of the NG tube is in the stomach.   2.  Dilated small bowel concerning for small bowel obstruction      CT Results (most recent):  Results from Hospital Encounter encounter on 07/23/08    [place] [address] [facility]    COMPUTED TOMOGRAPHY    NAME: Dejuan Huertas  PT : 1950               SEX: M         MR#: [id number]  LOCATION/NS: US-                 AGE: 57Y      ACCT: [de-identified]  ORDERING: MERLENE LOVE            PT TYPE: Marah Marvclarissa  RADIOLOGIST: Agustina Aceves ([id number])  Final Report      ICD Codes / Adm. Diagnosis:                  /  Examination:  CT CARDIAC OVER READ  - [id number] - 2008  9:37AM      CT CHEST, CARDIAC CT OVER READ, 2008    Reason:    REPORT:    TECHNIQUE: Helically acquired images were obtained from just above the  aortic architectural to the lower hemithoraces reconstructed at 2.5 mm  intervals after intravenous contrast.  No prior studies are available for  comparison. FINDINGS: There is no pericardial effusion. No pleural fluid is identified  in the visualized portions of the thorax. Incidental note is made of the  origin of the left vertebral artery from the aortic arch. The heart and  great vessels are otherwise unremarkable. There are coronary artery  calcifications will be described in dedicated coronary artery CTA report. The visualized portions of the lungs are clear with the exception of a tiny  granuloma within the right lower lobe measuring 2 mm. No adenopathy is seen within the thorax. IMPRESSION:  1. TINY RIGHT LOWER LOBE GRANULOMA. 2. CORONARY ARTERY CALCIFICATIONS.     Interpreting/Reading Doctor: Agustina Aceves ([id number])  Transcribed: AGS on 2008  Approved: Agustina Aceves ([id number])  2008        Distribution:  Attending Doctor: Lilibeth Cisneros Results (most recent):  Results from [facility] Encounter encounter on 08    [address]  [facility]    ULTRASOUND    NAME: Dejuan Huertas  PT : 1950               SEX: M         MR#: [id number]  LOCATION/NS: US-                 AGE: 11E      ACCT: [de-identified]  ORDERING: MERLENE KATE            PT TYPE: Tyshawn Shape  Abimael Wright ([id number])  Final Report      ICD Codes / Adm. Diagnosis:                  /  Examination:  CAROTID ULTRASOUND  - [id number] - Jul 23 2008  9:36AM      CAROTID ULTRASOUND, 07/23/08:    Reason:   Coronary artery disease, hypertension. 272.4. REPORT:  Right and left common, internal and external carotid artery peak  systolic velocities were all within normal limits and normal ratios were  demonstrated. Right carotid bifurcation demonstrates very mild soft plaque  within the proximal internal carotid artery without any significant  narrowing. Doppler waveform tracings are normal in the right common,  internal carotid arteries, as well as vertebral artery which demonstrate  antegrade flow. On the left, there is no suspicious atheromatous disease and normal Doppler  tracings are evident. Left vertebral artery demonstrates antegrade flow. IMPRESSION:    EXTREMELY MINIMAL ATHEROMATOUS DISEASE AT THE RIGHT CAROTID  BIFURCATION WITHOUT NARROWING.  OTHERWISE, NORMAL.     Interpreting/Reading Doctor: Vira Mendez ([id number])  Transcribed: CP on 07/23/2008  Approved: Vira Mendez ([id number])  07/23/2008        Distribution:  Attending Doctor: Marylu Martines        Admission date (for inpatients): 4/20/2022   * No surgery found *  Procedure(s) with comments:  LAPAROTOMY EXPLORATORY POSSIBLE BOWEL RESECTION - REDUCTION OF INTERNAL HERNIA        ASSESSMENT/PLAN:  Problem List  Date Reviewed: 12/14/2021          Codes Class Noted    On total parenteral nutrition (TPN) ICD-10-CM: Z78.9  ICD-9-CM: V49.89  4/26/2022        Status post exploratory laparotomy ICD-10-CM: V94.726  ICD-9-CM: V45.89  4/26/2022        Acute kidney injury (GINNY) with acute tubular necrosis (ATN) ([facility] [address] ICD-10-CM: N17.0  ICD-9-CM: 584.5  4/21/2022        * (Principal) SBO (small bowel obstruction) ([facility] ICD-10-CM: I86.940  ICD-9-CM: 560.9  4/20/2022    Overview Addendum 4/21/2022 1:49 PM by Mikaela Dwyer MD     4/21/22 s/p expl lap and reduction of internal hernia for high grade SBO; Dr Margarita Truong             Postsurgical hypothyroidism ICD-10-CM: E89.0  ICD-9-CM: 244.0  Unknown        Papillary thyroid carcinoma, multifocal (3 tumors in the left lobe, largest tumor 0.9 cm), status post thyroidectomy/central lymph node dissection 5/26/2015 and 54.5 mCi iodine-131 7/22/2015 ICD-10-CM: C73  ICD-9-CM: 278  6/21/2016        GERD (gastroesophageal reflux disease) ICD-10-CM: K21.9  ICD-9-CM: 530.81  Unknown        Malignant melanoma ([facility] [address] ICD-10-CM: C43.9  ICD-9-CM: 172.9  Unknown        Arthritis ICD-10-CM: M19.90  ICD-9-CM: 716.90  Unknown        Chronic coronary artery disease ICD-10-CM: I25.10  ICD-9-CM: 414.00  9/3/2015    Overview Signed 6/21/2016  8:29 AM by Randall JOHNS     Overview:   BMS RCA 2008; NORMAL STRESS TEST 2014    Last Assessment & Plan:   He does not have any anginal quality chest pain. He occasionally has sharp atypical pain. His stress test was normal less than a year ago. I don't plan any further investigation. Dyslipidemia ICD-10-CM: E78.5  ICD-9-CM: 272.4  9/3/2015    Overview Signed 6/21/2016  8:29 AM by Randall JOHNS     Overview:   STATIN INTOLERANT    Last Assessment & Plan:   He is statin intolerant but he is unable to tolerate red yeast rice and his total cholesterol is actually less than 200. We discussed the injectables but I don't think he is an appropriate candidate for that right now. He is doing pretty well just on Red yeast rice             Hypertension ICD-10-CM: I10  ICD-9-CM: 401.9  9/3/2015    Overview Signed 6/21/2016  8:29 AM by Dominga Miller. [address]:   Blood pressure is adequately controlled.              Dysphonia ICD-10-CM: R49.0  ICD-9-CM: 784.42  8/28/2015            Principal Problem:    SBO (small bowel obstruction) ([address] (4/20/2022)      Overview: 4/21/22 s/p expl lap and reduction of internal hernia for high grade SBO;       Dr Julio C Cardona    Active Problems:    Chronic coronary artery disease (9/3/2015)      Overview: Overview:       BMS RCA 2008; NORMAL STRESS TEST 2014            Last Assessment & Plan:       He does not have any anginal quality chest pain. He occasionally has sharp       atypical pain. His stress test was normal less than a year ago. I don't       plan any further investigation. Papillary thyroid carcinoma, multifocal (3 tumors in the left lobe, largest tumor 0.9 cm), status post thyroidectomy/central lymph node dissection 5/26/2015 and 54.5 mCi iodine-131 7/22/2015 (6/21/2016)      GERD (gastroesophageal reflux disease) ()      Postsurgical hypothyroidism ()      Acute kidney injury (GINNY) with acute tubular necrosis (ATN) ([facility] [address] (4/21/2022)      On total parenteral nutrition (TPN) (4/26/2022)      Status post exploratory laparotomy (4/26/2022)           Number and Complexity of Problems addressed and   Risks of complications and/or morbidity of management          High-grade SBO with leukocytosis refractory to NGT decompression  He is s/p expl laparotomy and reduction of internal hernia for high grade SBO on 4/21/22. NPO/IVF  +flatus-->NGT out 4/27/22  Clears started on 4/28/22  Possible discharge tomorrow on liquid diet    AF  WBC remains normal;  Leukocytosis resolved as of 4/22/22 (POD1)  Lactic acid is normal  Continue OOB qday    LFTs suddenly bumped the morning of surgery but subsequent LFTs were normal              Level of MDM (2/3 elements below)  Number and Complexity of Problems Addressed Amount and/or Complexity of Data to be Reviewed and Analyzed  *Each unique test, order, or document contributes to the combination of 2 or combination of 3 in Category 1 below.  Risk of Complications and/or Morbidity or Mortality of pt Management     914 09 925 SF Minimal  1self-limited or minor problem Minimal or none Minimal risk of morbidity from additional diagnostic testing or Rx   [id number] Low Low  2or more self-limited or minor problems;    or  1stable chronic illness;    or  1DINVM, uncomplicated illness or injury   Limited  (Must meet the requirements of at least 1 of the 2 categories)  Category 1: Tests and documents   Any combination of 2 from the following:  Review of prior external note(s) from each unique source*;  review of the result(s) of each unique test*;   ordering of each unique test*    or   Category 2: Assessment requiring an independent historian(s)  (For the categories of independent interpretation of tests and discussion of management or test interpretation, see moderate or high) Low risk of morbidity from additional diagnostic testing or treatment     29273  70178 Mod Moderate  1or more chronic illnesses with exacerbation, progression, or side effects of treatment;    or  2or more stable chronic illnesses;    or  1undiagnosed new problem with uncertain prognosis;    or  1acute illness with systemic symptoms;    or  5LVHSS complicated injury   Moderate  (Must meet the requirements of at least 1 out of 3 categories)  Category 1: Tests, documents, or independent historian(s)  Any combination of 3 from the following:   Review of prior external note(s) from each unique source*;  Review of the result(s) of each unique test*;  Ordering of each unique test*;  Assessment requiring an independent historian(s)    or  Category 2: Independent interpretation of tests   Independent interpretation of a test performed by another physician/other qualified health care professional (not separately reported);     or  Category 3: Discussion of management or test interpretation  Discussion of management or test interpretation with external physician/other qualified health care professional/appropriate source (not separately reported)   Moderate risk of morbidity from additional diagnostic testing or treatment  Examples only:  Prescription drug management   Decision regarding minor surgery with identified patient or procedure risk factors  Decision regarding elective major surgery without identified patient or procedure risk factors   Diagnosis or treatment significantly limited by social determinants of health       14511  14816 High High  1or more chronic illnesses with severe exacerbation, progression, or side effects of treatment;    or  1 acute or chronic illness or injury that poses a threat to life or bodily function   Extensive  (Must meet the requirements of at least 2 out of 3 categories)  Category 1: Tests, documents, or independent historian(s)  Any combination of 3 from the following:   Review of prior external note(s) from each unique source*;  Review of the result(s) of each unique test*;   Ordering of each unique test*;   Assessment requiring an independent historian(s)    or   Category 2: Independent interpretation of tests   Independent interpretation of a test performed by another physician/other qualified health care professional (not separately reported);     or  Category 3: Discussion of management or test interpretation  Discussion of management or test interpretation with external physician/other qualified health care professional/appropriate source (not separately reported)   High risk of morbidity from additional diagnostic testing or treatment  Examples only:  Drug therapy requiring intensive monitoring for toxicity  Decision regarding elective major surgery with identified patient or procedure risk factors  Decision regarding emergency major surgery  Decision regarding hospitalization  Decision not to resuscitate or to de-escalate care because of poor prognosis             I have personally performed a face-to-face diagnostic evaluation and management  service on this patient. I have independently seen the patient. I have independently obtained the above history from the patient/family.     I have independently examined the patient with above findings. I have independently reviewed data/labs for this patient and developed the above plan of care (MDM).   Signed: Capo Hopkins MD

## 2022-04-28 NOTE — DISCHARGE INSTR - DIET
Good nutrition is important when healing from an illness, injury, or surgery. Follow any nutrition recommendations given to you during your hospital stay. If you were given an oral nutrition supplement while in the hospital, continue to take this supplement at home. You can take it with meals, in-between meals, and/or before bedtime. These supplements can be purchased at most local grocery stores, pharmacies, and chain [employer]-stores. If you have any questions about your diet or nutrition, call the hospital and ask for the dietitian. Full liquid diet for 1 to 2 days or as instructed by surgeon. Include Oral Nutrition Supplement (ONS). Ensure Complete or Ensure Plus or a comparable/similar product Twice daily for up to 30 days unless otherwise directed by your physician. Progress to GI soft diet once tolerating full liquid diet. Remain on GI soft diet until follow up with surgeon.   Viviana Sommers, [address], [facility] [address]

## 2022-04-28 NOTE — PROGRESS NOTES
Hospitalist Progress Note   Admit Date:  2022  4:44 PM   Name:  Mike Gresham. Age:  80 y.o. Sex:  male  :  1950   MRN:  [id number]   Room:  341/01    Presenting Complaint: Abdominal Pain and Abnormal Lab Results    Reason(s) for Admission: SBO (small bowel obstruction) [facility] Course & Interval History:   Mike Valentin is a 80 y.o. male with medical history of GERD s/p nissen fundoplication , thyroid CA s/p resection, HTN, CAD who presented with epigastrict abdominal pain and RUQ pain that started around 4pm yesterday. Has not passed gas or had a BM since onset of symptoms. He had an outpatient CT today showing high grade SBO @ [facility] but wanted to be admitted at [facility] so he drove here. Labs significant for WBC 19K with neutrophile predominance. rec'd zosyn in ED. General surgery was notified and requested hospitalist admission. Patient in severe discomfort on my eval. Family at bedside. No PO intake > 24 hours    He is s/p Nissen fundoplication performed in  by Dr. Sanjeev Wilson at an outside hospital and reports he has been unable to vomit since then. He is s/p laparoscopic cholecystectomy  by Dr. Sanjeev Wilson  He is s/p open appendectomy . He is s/p colonoscopy in 2019 and reports this was normal with his next planned in 5 years.     OSH CT a/p - Findings consistent with high-grade small bowel obstruction with transition point within the right upper quadrant. Admitted NPO, NGT, IVF, Zosyn with no improvement overnight and urgently transferred to OR. Now s/p expl laparotomy and reduction of internal hernia for high grade SBO on 22. Small bowel entrapped in the hernia was ischemic and injured but not resected and looked injury looked reversible but will take some time per , surgery.  - TPN initiated    - NGT removed. Diet advanced     Subjective/24hr Events (22):  POD7 tolerating CLD w/ n/v.  No fevers ROS:  10 systems reviewed and negative except as noted above. Assessment & Plan:     High grade SBO with SIRS - refractory to bowel compression and rest. Now s/p expl laparotomy and reduction of internal hernia for high grade SBO on 4/21/22. Small bowel entrapped in the hernia was ischemic and injured but not resected and looked injury looked reversible but will take some time per , surgery. NGT removed. CLD. DC TPN after today. Acute liver injury - responded to medical therapy. LFTs normalized s/p surgery, abx and IVF. Trend CMP. Suspect 2/2 early sepsis from ischemic bowel. RUQ US showed normal size liver and heterogenous appearance and some contour nodularity suggested. No h/o alcohol abuse. H/o hepatoxic agent accicdentally taken double the dose years ago and was closely monitored for hepatic failure thereafter. Acute hepatitis panel negative. Lab Results   Component Value Date/Time    ALT (SGPT) 62 04/27/2022 04:14 AM    AST (SGOT) 35 04/27/2022 04:14 AM    Alk. phosphatase 83 04/27/2022 04:14 AM    Bilirubin, total 0.4 04/27/2022 04:14 AM       GINNY 2/2 ATN - improved s/p IVF. Liu Car Now back to baseline Scr ~1.0. cont. IVF, supportive care. Lab Results   Component Value Date/Time    Creatinine 1.28 04/28/2022 06:24 AM     Hyperglycemia - 2/2 severe illness, fasting  on admission now improved. a1c 5.6%. GERD s/p buzz fundoplication - stable. Change to PO PPI. Postsurgical hypothyroidism - started IV synthroid on 4/24. Change to PO in AM     HTN - amlodipine 2.5 mg restarted.      CAD - no angina. Papillary thyroid carcinoma, multifocal (3 tumors in the left lobe, largest tumor 0.9 cm), status post thyroidectomy/central lymph node dissection 5/26/2015 and 54.5 mCi iodine-131     - follows with endocrinology outpatient.            Discharge Planning:      Home in AM     Diet:  ADULT DIET Clear Liquid  DVT PPx: lovenox  Code status: Full Code    Hospital Problems as of 4/28/2022 Date Reviewed: 12/14/2021          Codes Class Noted - Resolved POA    * (Principal) SBO (small bowel obstruction) ([facility] ICD-10-CM: Y57.988  ICD-9-CM: 560.9  4/20/2022 - Present Yes    Overview Addendum 4/21/2022  1:49 PM by Jose David Ellis MD     4/21/22 s/p expl lap and reduction of internal hernia for high grade SBO; Dr Yasmeen Lemus             On total parenteral nutrition (TPN) ICD-10-CM: Z78.9  ICD-9-CM: V49.89  4/26/2022 - Present No        Status post exploratory laparotomy ICD-10-CM: X16.593  ICD-9-CM: V45.89  4/26/2022 - Present No        Acute kidney injury (GINNY) with acute tubular necrosis (ATN) ([facility] ICD-10-CM: N17.0  ICD-9-CM: 584.5  4/21/2022 - Present Yes        Postsurgical hypothyroidism ICD-10-CM: E89.0  ICD-9-CM: 244.0  Unknown - Present Yes        Papillary thyroid carcinoma, multifocal (3 tumors in the left lobe, largest tumor 0.9 cm), status post thyroidectomy/central lymph node dissection 5/26/2015 and 54.5 mCi iodine-131 7/22/2015 ICD-10-CM: C73  ICD-9-CM: 034  6/21/2016 - Present Yes        GERD (gastroesophageal reflux disease) ICD-10-CM: K21.9  ICD-9-CM: 530.81  Unknown - Present Yes        Chronic coronary artery disease ICD-10-CM: I25.10  ICD-9-CM: 414.00  9/3/2015 - Present Yes    Overview Signed 6/21/2016  8:29 AM by Tashi JOHNS     Overview:   BMS RCA 2008; NORMAL STRESS TEST 2014    Last Assessment & Plan:   He does not have any anginal quality chest pain. He occasionally has sharp atypical pain. His stress test was normal less than a year ago. I don't plan any further investigation.              RESOLVED: Leukocytosis ICD-10-CM: M85.402  ICD-9-CM: 288.60  4/21/2022 - 4/26/2022 Yes        RESOLVED: Generalized abdominal pain ICD-10-CM: R10.84  ICD-9-CM: 789.07  4/21/2022 - 4/26/2022 Yes        RESOLVED: Transaminitis ICD-10-CM: R74.01  ICD-9-CM: 790.4  4/21/2022 - 4/26/2022 No              Objective:     Patient Vitals for the past 24 hrs:   Temp Pulse Resp BP SpO2 04/28/22 0744 98.5 °F (36.9 °C) (!) 57 18 138/72 99 %   04/28/22 0404 98.2 °F (36.8 °C) (!) 55 16 (!) 152/73 97 %   04/27/22 2347 98.2 °F (36.8 °C) 64 14 138/74 99 %   04/27/22 1934 98 °F (36.7 °C) (!) 57 16 (!) 150/77 100 %   04/27/22 1632 97.8 °F (36.6 °C) 60 18 (!) 169/76 100 %   04/27/22 1122 98.4 °F (36.9 °C) (!) 55 17 (!) 155/75 97 %     Oxygen Therapy  O2 Sat (%): 99 % (04/28/22 0744)  O2 Device: None (Room air) (04/28/22 0745)  O2 Flow Rate (L/min): 10 l/min (04/21/22 1416)    Estimated body mass index is 27.06 kg/m² as calculated from the following:    Height as of this encounter: 5' 8\" (1.727 m). Weight as of this encounter: 80.7 kg (178 lb). Intake/Output Summary (Last 24 hours) at 4/28/2022 0940  Last data filed at 4/27/2022 1632  Gross per 24 hour   Intake    Output 400 ml   Net -400 ml         Physical Exam:     Blood pressure 138/72, pulse (!) 57, temperature 98.5 °F (36.9 °C), resp. rate 18, height 5' 8\" (1.727 m), weight 80.7 kg (178 lb), SpO2 99 %. General:    Less Ill appearing. Resting  In bed   Head:  Normocephalic, atraumatic  Eyes:  Sclerae appear normal.  Pupils equally round. ENT:  NGT now removed   Neck:  No restricted ROM. Trachea midline   CV:   RRR. No m/r/g. No jugular venous distension. Lungs:   CTAB. No wheezing, rhonchi, or rales. Respirations even, unlabored  Abdomen: Bowel sounds present. Soft. Non Distended. RUQ tenderness  Extremities: No cyanosis or clubbing. No edema  Skin:     No rashes and normal coloration. Warm and dry. Abdominal incision dressed. Neuro:  CN II-XII grossly intact. Sensation intact. A&Ox3  Psych:  Normal mood and affect.       I have reviewed ordered lab tests and independently visualized imaging below:    Recent Labs:  Recent Results (from the past 48 hour(s))   CBC W/O DIFF    Collection Time: 04/27/22  4:14 AM   Result Value Ref Range    WBC 7.2 4.3 - 11.1 K/uL    RBC 3.92 (L) 4.23 - 5.6 M/uL    HGB 12.7 (L) 13.6 - 17.2 g/dL HCT 37.0 (L) 41.1 - 50.3 %    MCV 94.4 79.6 - 97.8 FL    MCH 32.4 26.1 - 32.9 PG    MCHC 34.3 31.4 - 35.0 g/dL    RDW 13.2 11.9 - 14.6 %    PLATELET 894 255 - 281 K/uL    MPV 8.6 (L) 9.4 - 12.3 FL    ABSOLUTE NRBC 0.00 0.0 - 0.2 K/uL   METABOLIC PANEL, COMPREHENSIVE    Collection Time: 04/27/22  4:14 AM   Result Value Ref Range    Sodium 139 136 - 145 mmol/L    Potassium 4.0 3.5 - 5.1 mmol/L    Chloride 109 (H) 98 - 107 mmol/L    CO2 26 21 - 32 mmol/L    Anion gap 4 (L) 7 - 16 mmol/L    Glucose 102 (H) 65 - 100 mg/dL    BUN 19 8 - 23 MG/DL    Creatinine 1.05 0.8 - 1.5 MG/DL    GFR est AA >60 >60 ml/min/1.73m2    GFR est non-AA >60 >60 ml/min/1.73m2    Calcium 8.2 (L) 8.3 - 10.4 MG/DL    Bilirubin, total 0.4 0.2 - 1.1 MG/DL    ALT (SGPT) 62 12 - 65 U/L    AST (SGOT) 35 15 - 37 U/L    Alk.  phosphatase 83 50 - 136 U/L    Protein, total 5.8 (L) 6.3 - 8.2 g/dL    Albumin 2.4 (L) 3.2 - 4.6 g/dL    Globulin 3.4 2.3 - 3.5 g/dL    A-G Ratio 0.7 (L) 1.2 - 3.5     PHOSPHORUS    Collection Time: 04/27/22  4:14 AM   Result Value Ref Range    Phosphorus 2.8 2.3 - 3.7 MG/DL   MAGNESIUM    Collection Time: 04/27/22  4:14 AM   Result Value Ref Range    Magnesium 2.2 1.8 - 2.4 mg/dL   LACTIC ACID    Collection Time: 04/27/22  8:44 AM   Result Value Ref Range    Lactic acid 1.7 0.4 - 2.0 MMOL/L   HGB & HCT    Collection Time: 04/27/22  2:45 PM   Result Value Ref Range    HGB 14.5 13.6 - 17.2 g/dL    HCT 43.2 41.1 - 50.3 %   HGB & HCT    Collection Time: 04/27/22  6:56 PM   Result Value Ref Range    HGB 14.3 13.6 - 17.2 g/dL    HCT 42.3 41.1 - 50.3 %   CBC W/O DIFF    Collection Time: 04/28/22  6:24 AM   Result Value Ref Range    WBC 7.8 4.3 - 11.1 K/uL    RBC 4.13 (L) 4.23 - 5.6 M/uL    HGB 13.3 (L) 13.6 - 17.2 g/dL    HCT 39.2 (L) 41.1 - 50.3 %    MCV 94.9 79.6 - 97.8 FL    MCH 32.2 26.1 - 32.9 PG    MCHC 33.9 31.4 - 35.0 g/dL    RDW 13.2 11.9 - 14.6 %    PLATELET 337 317 - 213 K/uL    MPV 8.5 (L) 9.4 - 12.3 FL    ABSOLUTE NRBC 0.00 0.0 - 0.2 K/uL   METABOLIC PANEL, BASIC    Collection Time: 04/28/22  6:24 AM   Result Value Ref Range    Sodium 140 136 - 145 mmol/L    Potassium 4.7 3.5 - 5.1 mmol/L    Chloride 107 98 - 107 mmol/L    CO2 28 21 - 32 mmol/L    Anion gap 5 (L) 7 - 16 mmol/L    Glucose 107 (H) 65 - 100 mg/dL    BUN 19 8 - 23 MG/DL    Creatinine 1.28 0.8 - 1.5 MG/DL    GFR est AA >60 >60 ml/min/1.73m2    GFR est non-AA 59 (L) >60 ml/min/1.73m2    Calcium 8.9 8.3 - 10.4 MG/DL       All Micro Results     Procedure Component Value Units Date/Time    CULTURE, BLOOD [[id number]] Collected: 04/20/22 1759    Order Status: Completed Specimen: Blood Updated: 04/25/22 1228     Special Requests: --        NO SPECIAL REQUESTS  LEFT  Antecubital       Culture result: NO GROWTH 5 DAYS       CULTURE, BLOOD [[id number]] Collected: 04/20/22 1907    Order Status: Completed Specimen: Blood Updated: 04/25/22 1228     Special Requests: --        RIGHT  FOREARM       Culture result: NO GROWTH 5 DAYS             Other Studies:  No results found.     Current Meds:  Current Facility-Administered Medications   Medication Dose Route Frequency    amLODIPine (NORVASC) tablet 2.5 mg  2.5 mg Oral DAILY    TPN ADULT - PERIPHERAL - dextrose 5% amino acid 4.25%    IntraVENous QPM    pantoprazole (PROTONIX) 40 mg in 0.9% sodium chloride 10 mL injection  40 mg IntraVENous Q12H    labetaloL (NORMODYNE;TRANDATE) injection 20 mg  20 mg IntraVENous Q4H PRN    fat emulsion 20% (LIPOSYN, INTRAlipid) infusion 250 mL  250 mL IntraVENous QPM    levothyroxine (SYNTHROID) injection 100 mcg  100 mcg IntraVENous Q24H    carboxymethylcellulose sodium (REFRESH LIQUIGEL) 1 % ophthalmic solution 1 Drop  1 Drop Both Eyes PRN    NUTRITIONAL SUPPORT ELECTROLYTE PRN ORDERS   Does Not Apply PRN    HYDROmorphone (DILAUDID) injection 0.5 mg  0.5 mg IntraVENous Q3H PRN    Or    HYDROmorphone (DILAUDID) injection 1 mg  1 mg IntraVENous Q3H PRN    phenol throat spray (CHLORASEPTIC) 1 Spray  1 Spray Oral PRN    sodium chloride (NS) flush 5-40 mL  5-40 mL IntraVENous Q8H    sodium chloride (NS) flush 5-40 mL  5-40 mL IntraVENous PRN    acetaminophen (TYLENOL) tablet 650 mg  650 mg Oral Q6H PRN    ondansetron (ZOFRAN) injection 4 mg  4 mg IntraVENous Q6H PRN    piperacillin-tazobactam (ZOSYN) 3.375 g in 0.9% sodium chloride (MBP/ADV) 100 mL MBP  3.375 g IntraVENous Q8H    glucose chewable tablet 16 g  16 g Oral PRN    glucagon (GLUCAGEN) injection 1 mg  1 mg IntraMUSCular PRN    dextrose 10% infusion 125-250 mL  125-250 mL IntraVENous PRN    naloxone (NARCAN) injection 0.2 mg  0.2 mg IntraVENous EVERY 2 MINUTES AS NEEDED       Signed:  Trudi Yip DO    Part of this note may have been written by using a voice dictation software. The note has been proof read but may still contain some grammatical/other typographical errors.

## 2022-04-28 NOTE — PROGRESS NOTES
Care Management Interventions  PCP Verified by CM: Yes (saw Wed 4/20/22)  Palliative Care Criteria Met (RRAT>21 & CHF Dx)?: No  Transition of Care Consult (CM Consult): Discharge Planning  Discharge Durable Medical Equipment: No (CPAP)  Physical Therapy Consult: Yes  Occupational Therapy Consult: Yes  Speech Therapy Consult: No  Support Systems: Spouse/Significant Other [place] [phone])  Confirm Follow Up Transport: Family  The Plan for Transition of Care is Related to the Following Treatment Goals : Home with spouse  Discharge Location  Patient Expects to be Discharged to[de-identified] Home with family assistance     CM and MD met with patient following rounds this AM. Discharge is expected for tomorrow. No discharge planning needs noted. Wife requested information about dietary/nutrition needs. Dietician plans to meet with patient and wife to provide education and information. Patient to discharge home with his wife as primary caregiver.

## 2022-04-29 VITALS
TEMPERATURE: 98.2 F | WEIGHT: 178 LBS | BODY MASS INDEX: 26.98 KG/M2 | OXYGEN SATURATION: 98 % | DIASTOLIC BLOOD PRESSURE: 71 MMHG | HEIGHT: 68 IN | HEART RATE: 72 BPM | RESPIRATION RATE: 20 BRPM | SYSTOLIC BLOOD PRESSURE: 142 MMHG

## 2022-04-29 LAB
ANION GAP SERPL CALC-SCNC: 6 MMOL/L (ref 7–16)
BUN SERPL-MCNC: 17 MG/DL (ref 8–23)
CALCIUM SERPL-MCNC: 9.5 MG/DL (ref 8.3–10.4)
CHLORIDE SERPL-SCNC: 105 MMOL/L (ref 98–107)
CO2 SERPL-SCNC: 27 MMOL/L (ref 21–32)
CREAT SERPL-MCNC: 1.24 MG/DL (ref 0.8–1.5)
ERYTHROCYTE [DISTWIDTH] IN BLOOD BY AUTOMATED COUNT: 13.4 % (ref 11.9–14.6)
GLUCOSE SERPL-MCNC: 93 MG/DL (ref 65–100)
HCT VFR BLD AUTO: 41.8 % (ref 41.1–50.3)
HGB BLD-MCNC: 14.4 G/DL (ref 13.6–17.2)
MAGNESIUM SERPL-MCNC: 2.1 MG/DL (ref 1.8–2.4)
MCH RBC QN AUTO: 32.1 PG (ref 26.1–32.9)
MCHC RBC AUTO-ENTMCNC: 34.4 G/DL (ref 31.4–35)
MCV RBC AUTO: 93.1 FL (ref 79.6–97.8)
NRBC # BLD: 0 K/UL (ref 0–0.2)
PHOSPHATE SERPL-MCNC: 2.9 MG/DL (ref 2.3–3.7)
PLATELET # BLD AUTO: 370 K/UL (ref 150–450)
PMV BLD AUTO: 8.5 FL (ref 9.4–12.3)
POTASSIUM SERPL-SCNC: 4.2 MMOL/L (ref 3.5–5.1)
RBC # BLD AUTO: 4.49 M/UL (ref 4.23–5.6)
SODIUM SERPL-SCNC: 138 MMOL/L (ref 136–145)
WBC # BLD AUTO: 7.9 K/UL (ref 4.3–11.1)

## 2022-04-29 PROCEDURE — 2709999900 HC NON-CHARGEABLE SUPPLY

## 2022-04-29 PROCEDURE — 74011250637 HC RX REV CODE- 250/637: Performed by: SURGERY

## 2022-04-29 PROCEDURE — 84100 ASSAY OF PHOSPHORUS: CPT

## 2022-04-29 PROCEDURE — 85027 COMPLETE CBC AUTOMATED: CPT

## 2022-04-29 PROCEDURE — 74011000250 HC RX REV CODE- 250: Performed by: SURGERY

## 2022-04-29 PROCEDURE — 83735 ASSAY OF MAGNESIUM: CPT

## 2022-04-29 PROCEDURE — 74011250637 HC RX REV CODE- 250/637: Performed by: FAMILY MEDICINE

## 2022-04-29 PROCEDURE — 99024 POSTOP FOLLOW-UP VISIT: CPT | Performed by: SURGERY

## 2022-04-29 PROCEDURE — 74011250636 HC RX REV CODE- 250/636: Performed by: SURGERY

## 2022-04-29 PROCEDURE — 74011000258 HC RX REV CODE- 258: Performed by: SURGERY

## 2022-04-29 PROCEDURE — 80048 BASIC METABOLIC PNL TOTAL CA: CPT

## 2022-04-29 PROCEDURE — 36415 COLL VENOUS BLD VENIPUNCTURE: CPT

## 2022-04-29 RX ORDER — HYDROCODONE BITARTRATE AND ACETAMINOPHEN 5; 325 MG/1; MG/1
1-2 TABLET ORAL
Qty: 28 TABLET | Refills: 0 | Status: SHIPPED | OUTPATIENT
Start: 2022-04-29 | End: 2022-05-06

## 2022-04-29 RX ADMIN — SODIUM CHLORIDE, PRESERVATIVE FREE 10 ML: 5 INJECTION INTRAVENOUS at 05:40

## 2022-04-29 RX ADMIN — PIPERACILLIN AND TAZOBACTAM 3.38 G: 3; .375 INJECTION, POWDER, LYOPHILIZED, FOR SOLUTION INTRAVENOUS at 09:09

## 2022-04-29 RX ADMIN — PANTOPRAZOLE SODIUM 40 MG: 40 TABLET, DELAYED RELEASE ORAL at 05:35

## 2022-04-29 RX ADMIN — AMLODIPINE BESYLATE 2.5 MG: 2.5 TABLET ORAL at 09:08

## 2022-04-29 RX ADMIN — LEVOTHYROXINE SODIUM 137 MCG: 0.11 TABLET ORAL at 05:35

## 2022-04-29 RX ADMIN — ONDANSETRON 4 MG: 2 INJECTION INTRAMUSCULAR; INTRAVENOUS at 10:09

## 2022-04-29 RX ADMIN — PIPERACILLIN AND TAZOBACTAM 3.38 G: 3; .375 INJECTION, POWDER, LYOPHILIZED, FOR SOLUTION INTRAVENOUS at 01:03

## 2022-04-29 NOTE — DISCHARGE INSTRUCTIONS
Dressings/Wound Care  Keep incision covered with dry gauze and tape until follow-up visit. Try to keep incision as dry as possible to lower risk of infection. Activity  No heavy lifting (>5lbs) for 6 weeks to reduce risk of developing a hernia in the incisions. No driving until you are off pain meds for 24hrs and have no pain with movements associated with driving. Follow-up with Dr Bryanna Guajardo nurse practitioner Alexis Lutz in approx 10 days on a Monday for a wound check in the office. See Dr Aide Winters as needed after that visit. [address], [address]  (Call for an appt time unless one is already made for you by discharge nurse which is preferred -->[phone]-->option 1)    Diet  Soups, Juices, and Liquids for 1-2 days.   Then softer foods until follow-up

## 2022-04-29 NOTE — PROGRESS NOTES
Nutrition Note    Pt. reports he ate most of the full liquid breakfast this morning and then started to have diarrhea and nausea that seemed to be associated with it. Therefore pt is holding off on lunch intake until N/D improve. RD encouraged same. He is being discharged this afternoon. Provided with bottle of Ensure Plus to try at home.     Electronically signed by Amita Klein RD on 4/29/2022 at 1:45 PM    Contact: [phone]

## 2022-04-29 NOTE — PROGRESS NOTES
Patient given discharge instructions. PIV removed. Explained how to do dressing change to abdomen. NO questions or concerns at this time.

## 2022-04-29 NOTE — PROGRESS NOTES
Problem: Falls - Risk of  Goal: *Absence of Falls  Description: Document Shannon Mcburney Fall Risk and appropriate interventions in the flowsheet.   Outcome: Progressing Towards Goal  Note: Fall Risk Interventions:            Medication Interventions: Teach patient to arise slowly    Elimination Interventions: Call light in reach              Problem: Patient Education: Go to Patient Education Activity  Goal: Patient/Family Education  Outcome: Progressing Towards Goal     Problem: Patient Education: Go to Patient Education Activity  Goal: Patient/Family Education  Outcome: Progressing Towards Goal     Problem: Small Bowel Obstruction: Day 1  Goal: Off Pathway (Use only if patient is Off Pathway)  Outcome: Progressing Towards Goal  Goal: Activity/Safety  Outcome: Progressing Towards Goal  Goal: Consults, if ordered  Outcome: Progressing Towards Goal  Goal: Diagnostic Test/Procedures  Outcome: Progressing Towards Goal  Goal: Nutrition/Diet  Outcome: Progressing Towards Goal  Goal: Medications  Outcome: Progressing Towards Goal  Goal: Respiratory  Outcome: Progressing Towards Goal  Goal: Treatments/Interventions/Procedures  Outcome: Progressing Towards Goal  Goal: Psychosocial  Outcome: Progressing Towards Goal  Goal: *Optimal pain control at patient's stated goal  Outcome: Progressing Towards Goal  Goal: *Adequate urinary output (equal to or greater than 30 milliliters/hour)  Outcome: Progressing Towards Goal  Goal: *Hemodynamically stable  Outcome: Progressing Towards Goal  Goal: *Demonstrates progressive activity  Outcome: Progressing Towards Goal  Goal: *Absence of nausea/vomiting  Outcome: Progressing Towards Goal     Problem: Small Bowel Obstruction: Day 2  Goal: Off Pathway (Use only if patient is Off Pathway)  Outcome: Progressing Towards Goal  Goal: Activity/Safety  Outcome: Progressing Towards Goal  Goal: Consults, if ordered  Outcome: Progressing Towards Goal  Goal: Diagnostic Test/Procedures  Outcome: Progressing Towards Goal  Goal: Nutrition/Diet  Outcome: Progressing Towards Goal  Goal: Discharge Planning  Outcome: Progressing Towards Goal  Goal: Medications  Outcome: Progressing Towards Goal  Goal: Respiratory  Outcome: Progressing Towards Goal  Goal: Treatments/Interventions/Procedures  Outcome: Progressing Towards Goal  Goal: Psychosocial  Outcome: Progressing Towards Goal  Goal: *Optimal pain control at patient's stated goal  Outcome: Progressing Towards Goal  Goal: *Adequate urinary output (equal to or greater than 30 milliliters/hour)  Outcome: Progressing Towards Goal  Goal: *Hemodynamically stable  Outcome: Progressing Towards Goal  Goal: *Demonstrates progressive activity  Outcome: Progressing Towards Goal  Goal: *Absence of nausea/vomiting  Outcome: Progressing Towards Goal  Goal: *Return of normal bowel function  Outcome: Progressing Towards Goal     Problem: Small Bowel Obstruction: Day 3  Goal: Off Pathway (Use only if patient is Off Pathway)  Outcome: Progressing Towards Goal  Goal: Activity/Safety  Outcome: Progressing Towards Goal  Goal: Consults, if ordered  Outcome: Progressing Towards Goal  Goal: Diagnostic Test/Procedures  Outcome: Progressing Towards Goal  Goal: Nutrition/Diet  Outcome: Progressing Towards Goal  Goal: Discharge Planning  Outcome: Progressing Towards Goal  Goal: Medications  Outcome: Progressing Towards Goal  Goal: Respiratory  Outcome: Progressing Towards Goal  Goal: Treatments/Interventions/Procedures  Outcome: Progressing Towards Goal  Goal: Psychosocial  Outcome: Progressing Towards Goal  Goal: *Optimal pain control at patient's stated goal  Outcome: Progressing Towards Goal  Goal: *Adequate urinary output (equal to or greater than 30 milliliters/hour)  Outcome: Progressing Towards Goal  Goal: *Hemodynamically stable  Outcome: Progressing Towards Goal  Goal: *Adequate nutrition  Outcome: Progressing Towards Goal  Goal: *Demonstrates progressive activity  Outcome: Progressing Towards Goal  Goal: *Participates in discharge planning  Outcome: Progressing Towards Goal     Problem: Small Bowel Obstruction: Day 4 to Discharge  Goal: Off Pathway (Use only if patient is Off Pathway)  Outcome: Progressing Towards Goal  Goal: Activity/Safety  Outcome: Progressing Towards Goal  Goal: Nutrition/Diet  Outcome: Progressing Towards Goal  Goal: Discharge Planning  Outcome: Progressing Towards Goal  Goal: Medications  Outcome: Progressing Towards Goal  Goal: Respiratory  Outcome: Progressing Towards Goal  Goal: Treatments/Interventions/Procedures  Outcome: Progressing Towards Goal  Goal: Psychosocial  Outcome: Progressing Towards Goal     Problem: Small Bowel Obstruction - Non Surgical: Discharge Outcomes  Goal: *Hemodynamically stable  Outcome: Progressing Towards Goal  Goal: *Demonstrates independent activity or return to baseline  Outcome: Progressing Towards Goal  Goal: *Optimal pain control at patient's stated goal  Outcome: Progressing Towards Goal  Goal: *Verbalizes understanding and describes prescribed diet  Outcome: Progressing Towards Goal  Goal: *Tolerating diet  Outcome: Progressing Towards Goal  Goal: *Verbalizes name, dosage, time, side effects, and number of days to continue medications  Outcome: Progressing Towards Goal  Goal: *Anxiety reduced or absent  Outcome: Progressing Towards Goal  Goal: *Understands and describes signs and symptoms to report to providers(Stroke Metric)  Outcome: Progressing Towards Goal  Goal: *Describes follow-up/return visits to physicians  Outcome: Progressing Towards Goal  Goal: *Describes available resources and support systems  Outcome: Progressing Towards Goal  Goal: *Active bowel function  Outcome: Progressing Towards Goal     Problem: Patient Education: Go to Patient Education Activity  Goal: Patient/Family Education  4/29/2022 0421 by Jolynn Boeck, RN  Outcome: Progressing Towards Goal  4/29/2022 0421 by Jolynn Boeck, RN  Outcome: Progressing Towards Goal     Problem: Patient Education: Go to Patient Education Activity  Goal: Patient/Family Education  4/29/2022 0421 by Guillermo Campbell, RN  Outcome: Progressing Towards Goal  4/29/2022 0421 by Guillermo Campbell, RN  Outcome: Progressing Towards Goal

## 2022-04-29 NOTE — PROGRESS NOTES
H&P/Consult Note/Progress Note/Office Note:   Leopold Rich. MRN: [id number]  LKV:1/77/2363  Age:71 y.o.    HPI: Leopold Rich. is a 80 y.o. male who is s/p expl laparotomy and reduction of internal hernia for high grade SBO on 4/21/22. Prior to surgery he was admitted by the hospitalist after he came to the ER on 4/20/22 with a 2-day history of diffuse, constant, progressive, severe abdominal pain. Nothing in particular made his pain better or worse. He had associated nausea but no vomiting  No recent abdominal trauma reported  He is s/p Nissen fundoplication performed in 2006 by Dr. Irene Whyte at an outside hospital and reports he has been unable to vomit since then. He is s/p laparoscopic cholecystectomy 2006 by Dr. Irene Whyte  He is s/p open appendectomy 1969. He is s/p colonoscopy in 2019 and reports this was normal with his next planned in 5 years. He had leukocytosis in the ER and a CT scan as shown below with a high-grade small bowel obstruction. An NG tube was placed to decompression and he was given IV fluids and bowel rest without improvement in his abdominal pain  Surgery was consulted by Dr Gera Ling and Dr Tom Tubbs      4/20/22 CT abd/pelvis with oral and IV contrast  LOWER CHEST: Unremarkable. ABDOMEN AND PELVIS:   Liver: Unremarkable. Biliary system: Cholecystectomy.     Pancreas: Unremarkable. Spleen: Unremarkable. Adrenals: Unremarkable.     Genitourinary: Symmetric renal enhancement. No hydronephrosis or ureteral calculus. Urinary bladder decompressed. Reproductive organs: Prostate gland is enlarged. Gastrointestinal tract: Multiple dilated loops of small bowel present throughout the abdomen. There is abrupt transition to decompressed bowel involving the proximal ileum within the right lower quadrant (3, 62). Peritoneum/Extraperitoneum: Unremarkable. Small amount of abdominopelvic ascites.      Vascular: Unremarkable   Musculoskeletal:  Degenerative changes of thoracolumbar spine. No acute or aggressive osseous lesion. IMPRESSION:   Findings consistent with high-grade small bowel obstruction with transition point within the right upper quadrant. 4/21/22 abd US  Hx: Elevated LFTs      Findings: The liver is normal in size measuring 14.7 cm. No intrahepatic biliary ductal dilitation is seen. The liver appears heterogeneous and some contour nodularity is suggested. These can suggest cirrhosis. The main portal vein is patent and demonstrates hepatopedal flow.     The gallbladder has been removed. The common bile duct is normal in caliber measuring 3.2 mm.     The pancreas is not seen due to overlying bowel gas and therefore cannot be assessed.     The right kidney measures 10.4 cm in length. No shadowing stones, or hydronephrosis is seen of the right kidney. Only increased parenchymal echogenicity is seen suggested chronic medical renal changes.     The IVC is patent. The distal abdominal aorta is normal in caliber measuring 1.4cm. The non-visualized portions of the aorta cannot be evaluated. Small upper abdominal ascites is seen.     IMPRESSION  1. Heterogeneous liver with some questionable contour nodularity. These can be indicated to cirrhosis. 2.  No intra- or extrahepatic biliary ductal dilitation. 3.  Patent portal vein and IVC. Flow in the portal vein is hepatopedal in direction.    4. Small upper abdominal ascites.          additionl hx:  4/22/22 POD1 AF/VSS NGT; Follow LFTs  4/23/22 POD2 no new complaints; no flatus, wbc normal; Hepatitis panel negative  4/24/22 POD3 AF, normal WBC; repeat LFTs normal Toradol added for pain  4/25/22 POD4 elevated BP, still with sme abd pain, no flatus; AF, normal WBC  4/26/22 POD5 AF/VSS, WBC normal; no flatus yet  4/27/22 POD6 AF/VSS, up in a chair, comfortable, WBC nml, lactic acid normal; +flatus; NGT out today   4/28/22 POD7 AF/VSS +flatus; start clears today  4/29/22 POD8 AF/VSS +flatus;  Tolerated liquids; abd flat; Home today                Past Medical History:   Diagnosis Date    Arthritis     Chronic coronary artery disease     Gastroesophageal reflux disease     Gout     Hypercholesterolemia     Hypertension     Malignant melanoma     Papillary thyroid carcinoma, multifocal (3 tumors in the left lobe, largest tumor 0.9 cm), status post thyroidectomy/central lymph node dissection 5/26/2015 and 54.5 mCi iodine-131 7/22/2015     Postsurgical hypothyroidism      Past Surgical History:   Procedure Laterality Date    HX APPENDECTOMY  1969    HX CHOLECYSTECTOMY  2005    HX CORONARY ARTERY BYPASS GRAFT  08/19/2016    Dr. Chipper Seip at [address] [facility]  2010    HX GI  2008    Nissen fundoplication    HX KNEE ARTHROSCOPY Right 2005    HX MALIGNANT SKIN LESION EXCISION  2011    melanoma removed from ear lobe    HX THYROIDECTOMY  5/26/2015    including central lymph node dissection    HX TONSILLECTOMY  Age 4     Current Facility-Administered Medications   Medication Dose Route Frequency    HYDROcodone-acetaminophen (NORCO) 7.5-325 mg per tablet 1 Tablet  1 Tablet Oral Q6H PRN    oxyCODONE IR (ROXICODONE) tablet 10 mg  10 mg Oral Q4H PRN    pantoprazole (PROTONIX) tablet 40 mg  40 mg Oral ACB    levothyroxine (SYNTHROID) tablet 137 mcg  137 mcg Oral ACB    amLODIPine (NORVASC) tablet 2.5 mg  2.5 mg Oral DAILY    labetaloL (NORMODYNE;TRANDATE) injection 20 mg  20 mg IntraVENous Q4H PRN    carboxymethylcellulose sodium (REFRESH LIQUIGEL) 1 % ophthalmic solution 1 Drop  1 Drop Both Eyes PRN    NUTRITIONAL SUPPORT ELECTROLYTE PRN ORDERS   Does Not Apply PRN    phenol throat spray (CHLORASEPTIC) 1 Spray  1 Spray Oral PRN    sodium chloride (NS) flush 5-40 mL  5-40 mL IntraVENous Q8H    sodium chloride (NS) flush 5-40 mL  5-40 mL IntraVENous PRN    acetaminophen (TYLENOL) tablet 650 mg  650 mg Oral Q6H PRN    ondansetron (ZOFRAN) injection 4 mg  4 mg IntraVENous Q6H PRN    piperacillin-tazobactam (ZOSYN) 3.375 g in 0.9% sodium chloride (MBP/ADV) 100 mL MBP  3.375 g IntraVENous Q8H    glucose chewable tablet 16 g  16 g Oral PRN    glucagon (GLUCAGEN) injection 1 mg  1 mg IntraMUSCular PRN    dextrose 10% infusion 125-250 mL  125-250 mL IntraVENous PRN    naloxone (NARCAN) injection 0.2 mg  0.2 mg IntraVENous EVERY 2 MINUTES AS NEEDED     Patient has no known allergies. Social History     Socioeconomic History    Marital status:    Tobacco Use    Smoking status: Never Smoker    Smokeless tobacco: Never Used   Substance and Sexual Activity    Alcohol use: No    Drug use: No     Social History     Tobacco Use   Smoking Status Never Smoker   Smokeless Tobacco Never Used     Family History   Problem Relation Age of Onset    Cancer Mother         Lung    Arthritis-rheumatoid Father     Coronary Art Dis Father     Cancer Brother         Melanoma    Thyroid Cancer Neg Hx      ROS: The patient has no difficulty with chest pain or shortness of breath. No fever or chills. Comprehensive review of systems was otherwise unremarkable except as noted above. Physical Exam:   Visit Vitals  BP (!) 145/80   Pulse 71   Temp 98.1 °F (36.7 °C)   Resp 18   Ht 5' 8\" (1.727 m)   Wt 178 lb (80.7 kg)   SpO2 98%   BMI 27.06 kg/m²     Vitals:    04/28/22 1913 04/29/22 0005 04/29/22 0316 04/29/22 0714   BP: 137/71 (!) 150/64 (!) 143/72 (!) 145/80   Pulse: 60 62 65 71   Resp: 18 18 18 18   Temp: 98.2 °F (36.8 °C) 98.3 °F (36.8 °C) 98 °F (36.7 °C) 98.1 °F (36.7 °C)   SpO2: 96% 99% 96% 98%   Weight:       Height:         No intake/output data recorded. No intake/output data recorded. Constitutional: Alert, oriented, cooperative patient in no acute distress; appears stated age    Eyes:Sclera are clear. EOMs intact  ENMT: no external lesions gross hearing normal; no obvious neck masses, no ear or lip lesions, nares normal, +NGT with bilious output  CV: RRR.  Normal perfusion  Resp: No JVD. Breathing is  non-labored; no audible wheezing. GI: Inc OK; abd flat        Musculoskeletal: unremarkable with normal function. No embolic signs or cyanosis. Neuro:  Oriented; moves all 4; no focal deficits  Psychiatric: normal affect and mood, no memory impairment    Recent vitals (if inpt):  Patient Vitals for the past 24 hrs:   BP Temp Pulse Resp SpO2   04/29/22 0714 (!) 145/80 98.1 °F (36.7 °C) 71 18 98 %   04/29/22 0316 (!) 143/72 98 °F (36.7 °C) 65 18 96 %   04/29/22 0005 (!) 150/64 98.3 °F (36.8 °C) 62 18 99 %   04/28/22 1913 137/71 98.2 °F (36.8 °C) 60 18 96 %   04/28/22 1545 (!) 174/82 98.1 °F (36.7 °C) 64 17 100 %   04/28/22 1128 (!) 147/72 98 °F (36.7 °C) (!) 57 17 100 %       Amount and/or Complexity of Data Reviewed and Analyzed:  I reviewed and analyzed all of the unique labs and radiologic studies that are shown below as well as any that are in the HPI, and any that are in the expanded problem list below  *Each unique test, order, or document contributes to the combination of 2 or combination of 3 in Category 1 below. For this visit I also reviewed old records and prior notes. Recent Labs     04/29/22  0536 04/27/22  1445 04/27/22  0414   WBC 7.9   < > 7.2   HGB 14.4   < > 12.7*      < > 292      < > 139   K 4.2   < > 4.0      < > 109*   CO2 27   < > 26   BUN 17   < > 19   CREA 1.24   < > 1.05   GLU 93   < > 102*   TBILI  --   --  0.4   ALT  --   --  62   AP  --   --  83    < > = values in this interval not displayed.      Review of most recent CBC  Lab Results   Component Value Date/Time    WBC 7.9 04/29/2022 05:36 AM    HGB 14.4 04/29/2022 05:36 AM    HCT 41.8 04/29/2022 05:36 AM    PLATELET 284 25/65/4345 05:36 AM    MCV 93.1 04/29/2022 05:36 AM       Review of most recent BMP  Lab Results   Component Value Date/Time    Sodium 138 04/29/2022 05:36 AM    Potassium 4.2 04/29/2022 05:36 AM    Chloride 105 04/29/2022 05:36 AM    CO2 27 04/29/2022 05:36 AM    Anion gap 6 (L) 2022 05:36 AM    Glucose 93 2022 05:36 AM    BUN 17 2022 05:36 AM    Creatinine 1.24 2022 05:36 AM    GFR est AA >60 2022 05:36 AM    GFR est non-AA >60 2022 05:36 AM    Calcium 9.5 2022 05:36 AM       Review of most recent LFTs (and lipase if done)  Lab Results   Component Value Date/Time    ALT (SGPT) 62 2022 04:14 AM    AST (SGOT) 35 2022 04:14 AM    Alk. phosphatase 83 2022 04:14 AM    Bilirubin, total 0.4 2022 04:14 AM     Lab Results   Component Value Date/Time    Lipase 71 (L) 2022 05:08 PM       Lab Results   Component Value Date/Time    INR 1.0 2022 06:08 AM    Ammonia, plasma 31 2022 06:08 AM       Review of most recent HgbA1c  Lab Results   Component Value Date/Time    Hemoglobin A1c 5.6 2022 05:08 PM       Nutritional assessment screen for wound healing issues:  Lab Results   Component Value Date/Time    Protein, total 5.8 (L) 2022 04:14 AM    Albumin 2.4 (L) 2022 04:14 AM       @lastcovr@  XR Results (most recent):  Results from [place] encounter on 22    XR ABD (KUB)    Narrative  KUB    INDICATION: Nasogastric tube placement    A supine view of the abdomen was obtained. The tip of the nasogastric tube is  in the stomach. There is stable small bowel distention suggesting a small bowel  obstruction    Impression  1. Tip of the NG tube is in the stomach.   2.  Dilated small bowel concerning for small bowel obstruction      CT Results (most recent):  Results from Hospital Encounter encounter on 08    [facility] [place]    COMPUTED TOMOGRAPHY    NAME: Essie Shukla  PT : 1950               SEX: M         MR#: [id number]  LOCATION/NS: US-                 AGE: 84L      ACCT: [de-identified]  ORDERING: MERLENE LOVE            PT TYPE: O  RADIOLOGIST: TALI RANDHAWA Lorrie Muñoz ([id number])  Final Report      ICD Codes / Adm. Diagnosis:                  /  Examination:  CT CARDIAC OVER READ  - [id number] - 2008  9:37AM      CT CHEST, CARDIAC CT OVER READ, 2008    Reason:    REPORT:    TECHNIQUE: Helically acquired images were obtained from just above the  aortic architectural to the lower hemithoraces reconstructed at 2.5 mm  intervals after intravenous contrast.  No prior studies are available for  comparison. FINDINGS: There is no pericardial effusion. No pleural fluid is identified  in the visualized portions of the thorax. Incidental note is made of the  origin of the left vertebral artery from the aortic arch. The heart and  great vessels are otherwise unremarkable. There are coronary artery  calcifications will be described in dedicated coronary artery CTA report. The visualized portions of the lungs are clear with the exception of a tiny  granuloma within the right lower lobe measuring 2 mm. No adenopathy is seen within the thorax. IMPRESSION:  1. TINY RIGHT LOWER LOBE GRANULOMA. 2. CORONARY ARTERY CALCIFICATIONS. Interpreting/Reading Doctor: Anahi Varela ([id number])  Transcribed: AGS on 2008  Approved: Anahi Varela ([id number])  2008        Distribution:  Attending Doctor: Db Staton Results (most recent):  Results from [place] encounter on 08    [address]  [facility]    ULTRASOUND    NAME: Carlos Medina  PT : 1950               SEX: M         MR#: [id number]  LOCATION/NS: US-                 AGE: 75W      ACCT: [de-identified]  ORDERING: MERLENE LOVE            PT TYPE: Syeda Raphael ([id number])  Final Report      ICD Codes / Adm. Diagnosis:                  /  Examination:  CAROTID ULTRASOUND  - [id number] - 2008  9:36AM      CAROTID ULTRASOUND, 08:    Reason:   Coronary artery disease, hypertension. 272.4. REPORT:  Right and left common, internal and external carotid artery peak  systolic velocities were all within normal limits and normal ratios were  demonstrated. Right carotid bifurcation demonstrates very mild soft plaque  within the proximal internal carotid artery without any significant  narrowing. Doppler waveform tracings are normal in the right common,  internal carotid arteries, as well as vertebral artery which demonstrate  antegrade flow. On the left, there is no suspicious atheromatous disease and normal Doppler  tracings are evident. Left vertebral artery demonstrates antegrade flow. IMPRESSION:    EXTREMELY MINIMAL ATHEROMATOUS DISEASE AT THE RIGHT CAROTID  BIFURCATION WITHOUT NARROWING.  OTHERWISE, NORMAL.     Interpreting/Reading Doctor: Anneliese Leonard ([id number])  Transcribed: CP on 07/23/2008  Approved: Anneliese Leonard ([id number])  07/23/2008        Distribution:  Attending Doctor: Omega Dillon        Admission date (for inpatients): 4/20/2022   * No surgery found *  Procedure(s) with comments:  LAPAROTOMY EXPLORATORY POSSIBLE BOWEL RESECTION - REDUCTION OF INTERNAL HERNIA        ASSESSMENT/PLAN:  Problem List  Date Reviewed: 12/14/2021          Codes Class Noted    On total parenteral nutrition (TPN) ICD-10-CM: Z78.9  ICD-9-CM: V49.89  4/26/2022        Status post exploratory laparotomy ICD-10-CM: E55.675  ICD-9-CM: V45.89  4/26/2022        Acute kidney injury (GINNY) with acute tubular necrosis (ATN) ([facility] ICD-10-CM: N17.0  ICD-9-CM: 584.5  4/21/2022        * (Principal) SBO (small bowel obstruction) ([facility] ICD-10-CM: T69.945  ICD-9-CM: 560.9  4/20/2022    Overview Addendum 4/21/2022  1:49 PM by Vivi Raines MD     4/21/22 s/p expl lap and reduction of internal hernia for high grade SBO; Dr Lili Bartlett             Postsurgical hypothyroidism ICD-10-CM: E89.0  ICD-9-CM: 244.0  Unknown        Papillary thyroid carcinoma, multifocal (3 tumors in the left lobe, largest tumor 0.9 cm), status post thyroidectomy/central lymph node dissection 5/26/2015 and 54.5 mCi iodine-131 7/22/2015 ICD-10-CM: C73  ICD-9-CM: 202  6/21/2016        GERD (gastroesophageal reflux disease) ICD-10-CM: K21.9  ICD-9-CM: 530.81  Unknown        Malignant melanoma ([facility] ICD-10-CM: C43.9  ICD-9-CM: 172.9  Unknown        Arthritis ICD-10-CM: M19.90  ICD-9-CM: 716.90  Unknown        Chronic coronary artery disease ICD-10-CM: I25.10  ICD-9-CM: 414.00  9/3/2015    Overview Signed 6/21/2016  8:29 AM by Brenda JOHNS     Overview:   BMS RCA 2008; NORMAL STRESS TEST 2014    Last Assessment & Plan:   He does not have any anginal quality chest pain. He occasionally has sharp atypical pain. His stress test was normal less than a year ago. I don't plan any further investigation. Dyslipidemia ICD-10-CM: E78.5  ICD-9-CM: 272.4  9/3/2015    Overview Signed 6/21/2016  8:29 AM by Brenda JOHNS     Overview:   STATIN INTOLERANT    Last Assessment & Plan:   He is statin intolerant but he is unable to tolerate red yeast rice and his total cholesterol is actually less than 200. We discussed the injectables but I don't think he is an appropriate candidate for that right now. He is doing pretty well just on Red yeast rice             Hypertension ICD-10-CM: I10  ICD-9-CM: 401.9  9/3/2015    Overview Signed 6/21/2016  8:29 AM by Dominga Miller. Paul Condon [address]:   Blood pressure is adequately controlled. Dysphonia ICD-10-CM: R49.0  ICD-9-CM: 784.42  8/28/2015            Principal Problem:    SBO (small bowel obstruction) ([facility] (4/20/2022)      Overview: 4/21/22 s/p expl lap and reduction of internal hernia for high grade SBO;       Dr Chapin Jasso    Active Problems:    Chronic coronary artery disease (9/3/2015)      Overview: Overview:       BMS RCA 2008; NORMAL STRESS TEST 2014            Last Assessment & Plan:       He does not have any anginal quality chest pain.  He occasionally has sharp atypical pain. His stress test was normal less than a year ago. I don't       plan any further investigation. Papillary thyroid carcinoma, multifocal (3 tumors in the left lobe, largest tumor 0.9 cm), status post thyroidectomy/central lymph node dissection 5/26/2015 and 54.5 mCi iodine-131 7/22/2015 (6/21/2016)      GERD (gastroesophageal reflux disease) ()      Postsurgical hypothyroidism ()      Acute kidney injury (GINNY) with acute tubular necrosis (ATN) ([facility] (4/21/2022)      On total parenteral nutrition (TPN) (4/26/2022)      Status post exploratory laparotomy (4/26/2022)           Number and Complexity of Problems addressed and   Risks of complications and/or morbidity of management          High-grade SBO with leukocytosis refractory to NGT decompression  He is s/p expl laparotomy and reduction of internal hernia for high grade SBO on 4/21/22. He had no resection and no tissue was sent to path. Tolerated liquid diet  +flatus  AF  abd soft  WBC normal    Home today if OK with Hospitalists    He will see [facility] NP in 10 days for staple removal and then see us prn after that if doing OK              Level of MDM (2/3 elements below)  Number and Complexity of Problems Addressed Amount and/or Complexity of Data to be Reviewed and Analyzed  *Each unique test, order, or document contributes to the combination of 2 or combination of 3 in Category 1 below.  Risk of Complications and/or Morbidity or Mortality of pt Management     37244  25011 SF Minimal  1self-limited or minor problem Minimal or none Minimal risk of morbidity from additional diagnostic testing or Rx   54375  47065 Low Low  2or more self-limited or minor problems;    or  1stable chronic illness;    or  8LYJDF, uncomplicated illness or injury   Limited  (Must meet the requirements of at least 1 of the 2 categories)  Category 1: Tests and documents   Any combination of 2 from the following:  Review of prior external note(s) from each unique source*;  review of the result(s) of each unique test*;   ordering of each unique test*    or   Category 2: Assessment requiring an independent historian(s)  (For the categories of independent interpretation of tests and discussion of management or test interpretation, see moderate or high) Low risk of morbidity from additional diagnostic testing or treatment     88935  95195 Mod Moderate  1or more chronic illnesses with exacerbation, progression, or side effects of treatment;    or  2or more stable chronic illnesses;    or  1undiagnosed new problem with uncertain prognosis;    or  1acute illness with systemic symptoms;    or  7MYFBZ complicated injury   Moderate  (Must meet the requirements of at least 1 out of 3 categories)  Category 1: Tests, documents, or independent historian(s)  Any combination of 3 from the following:   Review of prior external note(s) from each unique source*;  Review of the result(s) of each unique test*;  Ordering of each unique test*;  Assessment requiring an independent historian(s)    or  Category 2: Independent interpretation of tests   Independent interpretation of a test performed by another physician/other qualified health care professional (not separately reported);     or  Category 3: Discussion of management or test interpretation  Discussion of management or test interpretation with external physician/other qualified health care professional/appropriate source (not separately reported)   Moderate risk of morbidity from additional diagnostic testing or treatment  Examples only:  Prescription drug management   Decision regarding minor surgery with identified patient or procedure risk factors  Decision regarding elective major surgery without identified patient or procedure risk factors   Diagnosis or treatment significantly limited by social determinants of health       69492  27754 High High  1or more chronic illnesses with severe exacerbation, progression, or side effects of treatment;    or  1 acute or chronic illness or injury that poses a threat to life or bodily function   Extensive  (Must meet the requirements of at least 2 out of 3 categories)  Category 1: Tests, documents, or independent historian(s)  Any combination of 3 from the following:   Review of prior external note(s) from each unique source*;  Review of the result(s) of each unique test*;   Ordering of each unique test*;   Assessment requiring an independent historian(s)    or   Category 2: Independent interpretation of tests   Independent interpretation of a test performed by another physician/other qualified health care professional (not separately reported);     or  Category 3: Discussion of management or test interpretation  Discussion of management or test interpretation with external physician/other qualified health care professional/appropriate source (not separately reported)   High risk of morbidity from additional diagnostic testing or treatment  Examples only:  Drug therapy requiring intensive monitoring for toxicity  Decision regarding elective major surgery with identified patient or procedure risk factors  Decision regarding emergency major surgery  Decision regarding hospitalization  Decision not to resuscitate or to de-escalate care because of poor prognosis             I have personally performed a face-to-face diagnostic evaluation and management  service on this patient. I have independently seen the patient. I have independently obtained the above history from the patient/family. I have independently examined the patient with above findings. I have independently reviewed data/labs for this patient and developed the above plan of care (MDM).   Signed: Rakel Zarate MD

## 2022-04-29 NOTE — DISCHARGE SUMMARY
[facility] [address]  [place], [address] [facility]  [phone]   Discharge Summary     Neelam Bermudez MRN: [id number]     : 1950     Age: 80 y.o. Admit date: 2022     Discharge date:  22  Attending Physician: Reza Drew MD, MD, FACS  Primary Discharge Diagnosis:   Principal Problem:    SBO (small bowel obstruction) ([facility] [address] (2022)      Overview: 22 s/p expl lap and reduction of internal hernia for high grade SBO;       Dr Saritha Yu    Active Problems:    Chronic coronary artery disease (9/3/2015)      Overview: Overview:       BMS RCA ; NORMAL STRESS TEST             Last Assessment & Plan:       He does not have any anginal quality chest pain. He occasionally has sharp       atypical pain. His stress test was normal less than a year ago. I don't       plan any further investigation. Papillary thyroid carcinoma, multifocal (3 tumors in the left lobe, largest tumor 0.9 cm), status post thyroidectomy/central lymph node dissection 2015 and 54.5 mCi iodine-131 2015 (2016)      GERD (gastroesophageal reflux disease) ()      Postsurgical hypothyroidism ()      Acute kidney injury (GINNY) with acute tubular necrosis (ATN) ([facility] [address] (2022)      On total parenteral nutrition (TPN) (2022)      Status post exploratory laparotomy (2022)      Primary Operations or Procedures Performed :  Procedure(s) with comments:  LAPAROTOMY EXPLORATORY POSSIBLE BOWEL RESECTION - REDUCTION OF INTERNAL HERNIA     Brief History and Reason for Admission: Neelam Bermudez was admitted with the following history of present illness.       HPI: Neelam Bermudez is a 80 y.o. male who is s/p expl laparotomy and reduction of internal hernia for high grade SBO on 22.        Prior to surgery he was admitted by the hospitalist after he came to the ER on 22 with a 2-day history of diffuse, constant, progressive, severe abdominal pain. Nothing in particular made his pain better or worse. He had associated nausea but no vomiting  No recent abdominal trauma reported  He is s/p Nissen fundoplication performed in 2006 by Dr. Ba Mcnamara at an outside hospital and reports he has been unable to vomit since then. He is s/p laparoscopic cholecystectomy 2006 by Dr. Ba Mcnamara  He is s/p open appendectomy 1969. He is s/p colonoscopy in 2019 and reports this was normal with his next planned in 5 years.     He had leukocytosis in the ER and a CT scan as shown below with a high-grade small bowel obstruction. An NG tube was placed to decompression and he was given IV fluids and bowel rest without improvement in his abdominal pain  Surgery was consulted by Dr Toby Christianson and Dr Tevin Chaparro        4/20/22 CT abd/pelvis with oral and IV contrast  LOWER CHEST: Unremarkable. ABDOMEN AND PELVIS:   Liver: Unremarkable. Biliary system: Cholecystectomy.     Pancreas: Unremarkable. Spleen: Unremarkable. Adrenals: Unremarkable.     Genitourinary: Symmetric renal enhancement. No hydronephrosis or ureteral calculus. Urinary bladder decompressed. Reproductive organs: Prostate gland is enlarged.      Gastrointestinal tract: Multiple dilated loops of small bowel present throughout the abdomen. There is abrupt transition to decompressed bowel involving the proximal ileum within the right lower quadrant (3, 62). Peritoneum/Extraperitoneum: Unremarkable. Small amount of abdominopelvic ascites. Vascular: Unremarkable   Musculoskeletal:  Degenerative changes of thoracolumbar spine. No acute or aggressive osseous lesion. IMPRESSION:   Findings consistent with high-grade small bowel obstruction with transition point within the right upper quadrant.             4/21/22 abd US  Hx: Elevated LFTs      Findings: The liver is normal in size measuring 14.7 cm.  No intrahepatic biliary ductal dilitation is seen.    The liver appears heterogeneous and some contour nodularity is suggested. These can suggest cirrhosis. The main portal vein is patent and demonstrates hepatopedal flow.     The gallbladder has been removed.  The common bile duct is normal in caliber measuring 3.2 mm.     The pancreas is not seen due to overlying bowel gas and therefore cannot be assessed.     The right kidney measures 10.4 cm in length.  No shadowing stones, or hydronephrosis is seen of the right kidney. Only increased parenchymal echogenicity is seen suggested chronic medical renal changes.     The IVC is patent. The distal abdominal aorta is normal in caliber measuring 1.4cm. The non-visualized portions of the aorta cannot be evaluated. Small upper abdominal ascites is seen.     IMPRESSION  1.  Heterogeneous liver with some questionable contour nodularity. These can be indicated to cirrhosis. 2.  No intra- or extrahepatic biliary ductal dilitation. 3.  Patent portal vein and IVC. Flow in the portal vein is hepatopedal in direction. 4. Small upper abdominal ascites.                      Hospital Course:      4/22/22 POD1 AF/VSS NGT; Follow LFTs  4/23/22 POD2 no new complaints; no flatus, wbc normal; Hepatitis panel negative  4/24/22 POD3 AF, normal WBC; repeat LFTs normal Toradol added for pain  4/25/22 POD4 elevated BP, still with sme abd pain, no flatus; AF, normal WBC  4/26/22 POD5 AF/VSS, WBC normal; no flatus yet  4/27/22 POD6 AF/VSS, up in a chair, comfortable, WBC nml, lactic acid normal; +flatus; NGT out today   4/28/22 POD7 AF/VSS +flatus; start clears today  4/29/22 POD8 Tolerated liquid diet; +flatus; abd soft; Home today if OK with Hospitalists       Condition at Discharge: good    Discharge Medications:   Current Discharge Medication List      START taking these medications    Details   HYDROcodone-acetaminophen (Norco) 5-325 mg per tablet Take 1-2 Tablets by mouth every eight (8) hours as needed for Pain for up to 7 days.  Max Daily Amount: 6 Tablets. Qty: 28 Tablet, Refills: 0    Associated Diagnoses: SBO (small bowel obstruction) ([facility] [address]         CONTINUE these medications which have NOT CHANGED    Details   amLODIPine (NORVASC) 5 mg tablet       TOPROL XL 25 mg XL tablet Take 25 mg by mouth daily. cholecalciferol (VITAMIN D3) 1,000 unit cap Take 2,000 Units by mouth daily. nitroglycerin (NITROSTAT) 0.4 mg SL tablet 0.4 mg by SubLINGual route. pravastatin (PRAVACHOL) 80 mg tablet Take 80 mg by mouth. allopurinol (ZYLOPRIM) 300 mg tablet Take 300 mg by mouth.      isosorbide mononitrate ER (IMDUR) 30 mg tablet       LevoxyL 137 mcg tablet Take 1 Tablet by mouth Daily (before breakfast). Qty: 90 Tablet, Refills: 3    Comments: Please cancel 125 mcg prescription. Associated Diagnoses: Postsurgical hypothyroidism      amitriptyline (ELAVIL) 10 mg tablet Take 1 Tab by mouth nightly. Qty: 60 Tab, Refills: 5      cyanocobalamin, vitamin B-12, 5,000 mcg TbDL Take 1 Tab by mouth. UBIDECARENONE (COENZYME Q10) 100 mg tab Take 200 mg by mouth daily. KRILL OIL PO Take  by mouth. STOP taking these medications       ascorbic acid, vitamin C, (Vitamin C) 500 mg tablet Comments:   Reason for Stopping:         aspirin delayed-release 81 mg tablet Comments:   Reason for Stopping:         ibuprofen (MOTRIN) 800 mg tablet Comments:   Reason for Stopping:                 Disposition/Discharge Instructions/Follow-up Care:      Dressings/Wound Care  Keep incision covered with dry gauze and tape until follow-up visit. Try to keep incision as dry as possible to lower risk of infection. Activity  No heavy lifting (>5lbs) for 6 weeks to reduce risk of developing a hernia in the incisions. No driving until you are off pain meds for 24hrs and have no pain with movements associated with driving. Follow-up with Dr Marco Berg nurse practitioner Guerda Stokes in approx 10 days on a Monday for a wound check in the office.   See Dr Karin Fabian as needed after that visit. [address], [address]  (Call for an appt time unless one is already made for you by discharge nurse which is preferred -->[phone]-->option 1)    Diet  Soups, Juices, and Liquids for 1-2 days.   Then softer foods until follow-up                 Signed:  Arabella Dukes MD, FACS   4/29/2022  7:47 AM

## 2022-04-29 NOTE — PROGRESS NOTES
Care Management Interventions  PCP Verified by CM: Yes (saw Wed 4/20/22)  Palliative Care Criteria Met (RRAT>21 & CHF Dx)?: No  Transition of Care Consult (CM Consult): Discharge Planning  Discharge Durable Medical Equipment: No (CPAP)  Physical Therapy Consult: Yes  Occupational Therapy Consult: Yes  Speech Therapy Consult: No  Support Systems: Spouse/Significant Other Andrew Dorothy [phone])  Confirm Follow Up Transport: Family  The Plan for Transition of Care is Related to the Following Treatment Goals : Home with spouse  Discharge Location  Patient Expects to be Discharged to[de-identified] Home with family assistance     Pt to discharge home with wife today. No discharge planning needs. Pt to follow up with [facility] in 10 days for a wound check. Pt and spouse are in agreement with plans.

## 2022-04-29 NOTE — DISCHARGE SUMMARY
Hospitalist Discharge Summary     Patient ID:  Grzegorz Munoz  [id number]  80 y.o.  1950  Admit date: 4/20/2022  4:44 PM  Discharge date and time: 4/29/2022  Attending: Dillon Elizondo DO  PCP:  Corazon Lynch MD  Treatment Team: Attending Provider: Dillon Elizondo DO; Consulting Provider: Destiny Molina NP; Utilization Review: Leslie Resendiz; Care Manager: Ashleigh Brown RN; Care Manager: Sandra Shepard; Hospitalist: Dillon Elizondo DO; Primary Nurse: Yanick Shultz RN    Principal Diagnosis SBO (small bowel obstruction) [facility])   Principal Problem:    SBO (small bowel obstruction) ([facility] [address] (4/20/2022)      Overview: 4/21/22 s/p expl lap and reduction of internal hernia for high grade SBO;       Dr Diallo Jack    Active Problems:    Chronic coronary artery disease (9/3/2015)      Overview: Overview:       BMS RCA 2008; NORMAL STRESS TEST 2014            Last Assessment & Plan:       He does not have any anginal quality chest pain. He occasionally has sharp       atypical pain. His stress test was normal less than a year ago. I don't       plan any further investigation. Papillary thyroid carcinoma, multifocal (3 tumors in the left lobe, largest tumor 0.9 cm), status post thyroidectomy/central lymph node dissection 5/26/2015 and 54.5 mCi iodine-131 7/22/2015 (6/21/2016)      GERD (gastroesophageal reflux disease) ()      Postsurgical hypothyroidism ()      Acute kidney injury (GINNY) with acute tubular necrosis (ATN) ([facility] [address] (4/21/2022)      On total parenteral nutrition (TPN) (4/26/2022)      Status post exploratory laparotomy (4/26/2022)       Janes Kim Jr. is a 80 y. o. male with medical history of GERD s/p nissen fundoplication 8316, thyroid CA s/p resection, HTN, CAD who presented with epigastrict abdominal pain and RUQ pain that started around 4pm yesterday. Has not passed gas or had a BM since onset of symptoms.  He had an outpatient CT today showing high grade SBO @ [facility] but wanted to be admitted at [facility] so he drove here. Labs significant for WBC 19K with neutrophile predominance. rec'd zosyn in ED. General surgery was notified and requested hospitalist admission. Patient in severe discomfort on my eval. Family at bedside. No PO intake > 24 hours     He is s/p Nissen fundoplication performed in 2006 by Dr. Trav Rangel at an outside hospital and reports he has been unable to vomit since then. He is s/p laparoscopic cholecystectomy 2006 by Dr. Trav Rangel  He is s/p open appendectomy 1969. He is s/p colonoscopy in 2019 and reports this was normal with his next planned in 5 years.     OSH CT a/p - Findings consistent with high-grade small bowel obstruction with transition point within the right upper quadrant.      Admitted NPO, NGT, IVF, Zosyn with no improvement overnight and urgently transferred to OR. Now s/p expl laparotomy and reduction of internal hernia for high grade SBO on 4/21/22. Small bowel entrapped in the hernia was ischemic and injured but not resected and looked injury looked reversible but will take some time per , surgery.      4/24 - TPN initiated   4/25 - NGT removed. Diet advanced     Interval History (4/29); patient examined at bedside. No acute overnight events. Had a large bowel movement this morning. Denies fevers/chills, chest pain, shortness of breath, abdominal pain, nausea/vomiting or diarrhea. Tolerating diet well without issue. Hospital Course:  Please refer to the admission H&P for details of presentation. In summary, the patient is admitted for high grade SBP now s/p ex lap with reduction of internal hernia on 4/21/2022. He was placed on TPN and empiric antibiotics along with NG tube insertion. He has improved clinically and his NG tube has been discontinued. He has been tolerating an oral diet without issue and is feeling much better. He is currently stable for hospital discharge.  He should follow-up with his PCP and with Dr. Doug Villasenor next week. Details of hospitalization have been discussed with patient at bedside who understands and agrees with plan of care and discharge home. Return precautions provided. All questions answered. Significant Diagnostic Studies:       Labs: Results:       Chemistry Recent Labs     04/29/22 0536 04/28/22  0624 04/27/22  0414   GLU 93 107* 102*    140 139   K 4.2 4.7 4.0    107 109*   CO2 27 28 26   BUN 17 19 19   CREA 1.24 1.28 1.05   CA 9.5 8.9 8.2*   AGAP 6* 5* 4*   AP  --   --  83   TP  --   --  5.8*   ALB  --   --  2.4*   GLOB  --   --  3.4   AGRAT  --   --  0.7*      CBC w/Diff Recent Labs     04/29/22 0536 04/28/22 0624 04/27/22  1856 04/27/22  1445 04/27/22 0414   WBC 7.9 7.8  --   --  7.2   RBC 4.49 4.13*  --   --  3.92*   HGB 14.4 13.3* 14.3   < > 12.7*   HCT 41.8 39.2* 42.3   < > 37.0*    338  --   --  292    < > = values in this interval not displayed. Cardiac Enzymes No results for input(s): CPK, CKND1, DARIN in the last 72 hours. No lab exists for component: CKRMB, TROIP   Coagulation No results for input(s): PTP, INR, APTT, INREXT in the last 72 hours. Lipid Panel Lab Results   Component Value Date/Time    Triglyceride 115 04/25/2022 06:06 AM      BNP No results for input(s): BNPP in the last 72 hours. Liver Enzymes Recent Labs     04/27/22 0414   TP 5.8*   ALB 2.4*   AP 83      Thyroid Studies Lab Results   Component Value Date/Time    TSH 0.634 02/14/2022 08:06 AM            Discharge Exam:  Visit Vitals  BP (!) 142/71 (BP 1 Location: Right upper arm, BP Patient Position: Supine)   Pulse 72   Temp 98.2 °F (36.8 °C)   Resp 20   Ht 5' 8\" (1.727 m)   Wt 80.7 kg (178 lb)   SpO2 98%   BMI 27.06 kg/m²     General appearance: alert, cooperative, no distress, appears stated age  Lungs: clear to auscultation bilaterally. Nonlabored. No wheezing. Heart: regular rate and rhythm, S1, S2 normal, no JVD  Abdomen: soft, non-tender.  Bowel sounds normal. No masses,  no organomegaly  Extremities: no cyanosis or edema  Neurologic: Grossly normal    Disposition: home  Discharge Condition: stable  Patient Instructions:   Current Discharge Medication List      START taking these medications    Details   HYDROcodone-acetaminophen (Norco) 5-325 mg per tablet Take 1-2 Tablets by mouth every eight (8) hours as needed for Pain for up to 7 days. Max Daily Amount: 6 Tablets. Qty: 28 Tablet, Refills: 0  Start date: 4/29/2022, End date: 5/6/2022    Associated Diagnoses: SBO (small bowel obstruction) ([facility] [address]         CONTINUE these medications which have NOT CHANGED    Details   amLODIPine (NORVASC) 5 mg tablet       TOPROL XL 25 mg XL tablet Take 25 mg by mouth daily. cholecalciferol (VITAMIN D3) 1,000 unit cap Take 2,000 Units by mouth daily. nitroglycerin (NITROSTAT) 0.4 mg SL tablet 0.4 mg by SubLINGual route. pravastatin (PRAVACHOL) 80 mg tablet Take 80 mg by mouth. allopurinol (ZYLOPRIM) 300 mg tablet Take 300 mg by mouth.      isosorbide mononitrate ER (IMDUR) 30 mg tablet       LevoxyL 137 mcg tablet Take 1 Tablet by mouth Daily (before breakfast). Qty: 90 Tablet, Refills: 3    Comments: Please cancel 125 mcg prescription. Associated Diagnoses: Postsurgical hypothyroidism      amitriptyline (ELAVIL) 10 mg tablet Take 1 Tab by mouth nightly. Qty: 60 Tab, Refills: 5      cyanocobalamin, vitamin B-12, 5,000 mcg TbDL Take 1 Tab by mouth. UBIDECARENONE (COENZYME Q10) 100 mg tab Take 200 mg by mouth daily. KRILL OIL PO Take  by mouth.          STOP taking these medications       ascorbic acid, vitamin C, (Vitamin C) 500 mg tablet Comments:   Reason for Stopping:         aspirin delayed-release 81 mg tablet Comments:   Reason for Stopping:         ibuprofen (MOTRIN) 800 mg tablet Comments:   Reason for Stopping:               Activity: Activity as tolerated  Diet: full liquid diet advance as tolerated  Wound Care: As directed    Follow-up  ·   With PCP and with  Deed[place] in 3-5 days. Time spent to discharge patient 35 minutes  Signed:   Fred Villar DO  4/29/2022  5:01 PM

## 2022-05-05 NOTE — PROGRESS NOTES
Physician Progress Note      PATIENT:               Wiliam Rosario  CSN #:                  [id number]  :                       1950  ADMIT DATE:       2022 4:44 PM  [address] DATE:        2022 6:50 PM  RESPONDING  PROVIDER #:        Lanre OLPEZ DO          QUERY TEXT:    Pt admitted with SBO associated with an incarcerated hernia. Pt noted to have GINNY/ATN. If possible, please document in the progress notes and discharge summary if you are evaluating and/or treating any of the following: The medical record reflects the following:  Risk Factors: HTN  Clinical Indicators:creatinine of 1.49 that improved to 1.0 and was 1.24 at discharge  Treatment: IVF  Defined by Kidney Disease Improving Global Outcomes (KDIGO) clinical practice guideline for acute kidney injury:  -Increase in SCr by greater than or equal to 0.3 mg/dl within 48?hours; or  -Increase or decrease in SCr to greater than or equal to 1.5 times baseline, which is known or presumed to have occurred within the prior 7 days; or  -Urine volume < 0.5ml/kg/h for 6 hours  Options provided:  -- Acute kidney failure with acute tubular necrosis  -- Acute kidney failure with acute tubular necrosis, ruled out  -- Other - I will add my own diagnosis  -- Disagree - Not applicable / Not valid  -- Disagree - Clinically unable to determine / Unknown  -- Refer to Clinical Documentation Reviewer    PROVIDER RESPONSE TEXT:    This patient is in Acute kidney failure with acute tubular necrosis.     Query created by: una toribio on 5/3/2022 8:14 AM      Electronically signed by:  Ricardo Guerra DO 2022 7:37 AM

## 2022-05-26 DIAGNOSIS — C73 PAPILLARY THYROID CARCINOMA (HCC): Primary | ICD-10-CM

## 2022-05-26 DIAGNOSIS — E89.0 POSTSURGICAL HYPOTHYROIDISM: Primary | ICD-10-CM

## 2022-06-09 DIAGNOSIS — C73 PAPILLARY THYROID CARCINOMA (HCC): ICD-10-CM

## 2022-06-09 DIAGNOSIS — E89.0 POSTSURGICAL HYPOTHYROIDISM: ICD-10-CM

## 2022-06-09 LAB
T4 FREE SERPL-MCNC: 1.2 NG/DL (ref 0.78–1.46)
TSH, 3RD GENERATION: 0.14 UIU/ML (ref 0.36–3.74)

## 2022-06-10 LAB
THYROGLOB AB SERPL-ACNC: <1 IU/ML (ref 0–0.9)
THYROGLOBULIN: <0.1 NG/ML (ref 1.4–29.2)

## 2022-06-14 ENCOUNTER — OFFICE VISIT (OUTPATIENT)
Dept: ENDOCRINOLOGY | Age: 72
End: 2022-06-14
Payer: MEDICARE

## 2022-06-14 VITALS
BODY MASS INDEX: 26.91 KG/M2 | WEIGHT: 177 LBS | HEART RATE: 43 BPM | SYSTOLIC BLOOD PRESSURE: 126 MMHG | DIASTOLIC BLOOD PRESSURE: 78 MMHG | OXYGEN SATURATION: 93 %

## 2022-06-14 DIAGNOSIS — E89.0 POSTSURGICAL HYPOTHYROIDISM: ICD-10-CM

## 2022-06-14 DIAGNOSIS — C73 PAPILLARY THYROID CARCINOMA (HCC): Primary | ICD-10-CM

## 2022-06-14 PROCEDURE — 99214 OFFICE O/P EST MOD 30 MIN: CPT | Performed by: INTERNAL MEDICINE

## 2022-06-14 PROCEDURE — 4004F PT TOBACCO SCREEN RCVD TLK: CPT | Performed by: INTERNAL MEDICINE

## 2022-06-14 PROCEDURE — G8417 CALC BMI ABV UP PARAM F/U: HCPCS | Performed by: INTERNAL MEDICINE

## 2022-06-14 PROCEDURE — 1123F ACP DISCUSS/DSCN MKR DOCD: CPT | Performed by: INTERNAL MEDICINE

## 2022-06-14 PROCEDURE — G8428 CUR MEDS NOT DOCUMENT: HCPCS | Performed by: INTERNAL MEDICINE

## 2022-06-14 PROCEDURE — 3017F COLORECTAL CA SCREEN DOC REV: CPT | Performed by: INTERNAL MEDICINE

## 2022-06-14 RX ORDER — LEVOTHYROXINE SODIUM 137 UG/1
137 TABLET ORAL
Qty: 90 TABLET | Refills: 3 | Status: SHIPPED | OUTPATIENT
Start: 2022-06-14

## 2022-06-14 NOTE — PROGRESS NOTES
Sarah Malcolm MD, [address] [facility]            Reason for visit: Follow-up of thyroid cancer      ASSESSMENT AND PLAN:    1. Papillary thyroid carcinoma ([address]  His thyroid cancer was low risk. Following thyroidectomy and radioactive iodine treatment in 2015, all testing has been suggestive of cure. Thyroglobulin remains undetectable. Going forward, he can be followed with once or twice yearly thyroglobulin. Ultrasound is not necessary unless thyroglobulin or physical exam changes. TSH target is 0.1-1.5.    - Thyroglobulin Panel; Future    2. Postsurgical hypothyroidism  TSH is at target. - levothyroxine (LEVOXYL) 137 MCG tablet; Take 1 tablet by mouth every morning (before breakfast)  Dispense: 90 tablet; Refill: 3  - TSH; Future  - T4, Free; Future      Follow-up and Dispositions    · Return in about 6 months (around 12/14/2022). History of Present Illness:    Araceli Morgan presents to the office for follow up of papillary thyroid carcinoma and postsurgical hypothyroidism. He underwent thyroidectomy and received radioactive iodine remnant ablation in 2015. Presentation of thyroid cancer: Mr. Anna Marie Fisher was evaluated by Dr. Silvia Bangura for a sonographically suspicious thyroid nodule. Biopsy was benign, but because of the presence of multiple nodules, thyroidectomy was performed. Surgical pathology revealed the presence of multifocal papillary thyroid carcinoma. Prior treatment:   5/26/2015: Thyroidectomy and central lymph node dissection Adine Dylan)- multifocal papillary thyroid carcinoma (follicular variant) in the left lobe (3 tumors, largest 0.9 cm); indeterminate lymphatic invasion; indeterminate extrathyroidal extension; focal involvement of resection margin (left posterior lobe); background chronic lymphocytic thyroiditis; 0 of 3 lymph nodes positive. 7/22/2015: Thyrogen-stimulated remnant ablation with 54.5 mCi iodine-131.      Labs: 2015: TSH 2.233.  2015: Thyrogen-stimulated thyroglobulin less than 0.4, thyroglobulin antibodies 2.  10/23/2015: TSH 0.962, free T4 1.2.  2016: TSH 0.510, free T4 1.22, thyroglobulin less than 0.1, thyroglobulin antibodies less than 1.  2017: TSH 0.633, free T4 1.50, thyroglobulin less than 0.1, thyroglobulin antibodies less than 1.0.  2017: TSH 0.270, free T4 1.3, thyroglobulin less than 0.1, thyroglobulin antibodies less than 1.0.  2017: TSH 1.350, free T4 1.47.  2018: TSH 1.650, free T4 1.47, thyroglobulin less than 0.1, thyroglobulin antibodies less than 1.0.  2018: TSH 1.210, free T4 1.31, thyroglobulin less than 0.1, thyroglobulin antibodies less than 1.0.  2019: TSH 1.240, free T4 1.42, thyroglobulin less than 0.1, thyroglobulin antibodies less than 1.0.  2020: TSH 0.748, free T4 1.52, thyroglobulin less than 0.1, thyroglobulin antibodies less than 1.0.  2021: TSH 4.780, free T4 1.19, thyroglobulin less than 0.1, thyroglobulin antibodies less than 1.0.  2022: TSH 0.145, free T4 1.2, thyroglobulin less than 0.1, thyroglobulin antibodies less than 1.0. Imagin2015: Thyrogen-stimulated posttreatment whole body scan [facility])- uptake in the anterior left neck. Abnormal uptake in the left upper abdominal quadrant. Treatment of hypothyroidism: He was started on Levoxyl 125 mcg daily following thyroidectomy. His dose has been adjusted as follows:  -137 mcg daily 2015  -125 mcg daily 2017  -137 mcg daily 2022    Symptoms: See review of systems below    Review of Systems   Constitutional: Positive for fatigue. Negative for unexpected weight change (intentionally lost 8 pounds in 6 months). Cardiovascular: Negative for palpitations. Neurological: Positive for weakness. Negative for tremors.        /78 (Site: Left Upper Arm, Position: Sitting)   Pulse (!) 43   Wt 177 lb (80.3 kg)   SpO2 93%   BMI 26.91 kg/m² Wt Readings from Last 3 Encounters:   06/14/22 177 lb (80.3 kg)   12/14/21 185 lb (83.9 kg)       Physical Exam  Constitutional:       Appearance: Normal appearance. HENT:      Head: Normocephalic. Neck:      Thyroid: No thyroid mass or thyromegaly. Comments: Healed thyroidectomy scar. No palpable neck masses. Cardiovascular:      Rate and Rhythm: Normal rate and regular rhythm. Pulmonary:      Effort: Pulmonary effort is normal.      Breath sounds: Normal breath sounds. Neurological:      Mental Status: He is alert. Psychiatric:         Mood and Affect: Mood normal.         Behavior: Behavior normal.         Orders Placed This Encounter   Procedures    TSH     Standing Status:   Future     Standing Expiration Date:   6/14/2023    T4, Free     Standing Status:   Future     Standing Expiration Date:   6/14/2023    Thyroglobulin Panel     Standing Status:   Future     Standing Expiration Date:   6/14/2023       Current Outpatient Medications   Medication Sig Dispense Refill    levothyroxine (LEVOXYL) 137 MCG tablet Take 1 tablet by mouth every morning (before breakfast) 90 tablet 3    KRILL OIL PO Take by mouth      allopurinol (ZYLOPRIM) 300 MG tablet Take 300 mg by mouth      amitriptyline (ELAVIL) 10 MG tablet Take 10 mg by mouth      aspirin 81 MG EC tablet Take 81 mg by mouth      vitamin D 25 MCG (1000 UT) CAPS Take 2,000 Units by mouth daily      Coenzyme Q10 100 MG TABS Take 200 mg by mouth daily      Cyanocobalamin 5000 MCG TBDP Take 1 tablet by mouth      metoprolol succinate (TOPROL XL) 25 MG extended release tablet Take 25 mg by mouth daily      nitroGLYCERIN (NITROSTAT) 0.4 MG SL tablet Place 0.4 mg under the tongue      pravastatin (PRAVACHOL) 80 MG tablet Take 80 mg by mouth       No current facility-administered medications for this visit. Mrak Whitfield MD, FACE      Portions of this note were generated with the assistance of voice recognition software.   As such, some errors in transcription may be present.

## 2022-12-02 NOTE — PROGRESS NOTES
Patient care and progress discussed in  interdisciplinarily rounds with the following disciplines: Physician, Case Management, Nursing, Dietary, and Therapy. The plan of care was discussed along with goals for discharge date/ location. Pt is making progress towards discharge. He has been ambulating in the mejias and PT services have signed off. Pt is currently on TPN. Possible advancement of diet soon. Patient will discharge home with his family when medically stable. CM will continue to follow to assist with planning. no

## 2022-12-09 DIAGNOSIS — C73 PAPILLARY THYROID CARCINOMA (HCC): ICD-10-CM

## 2022-12-09 DIAGNOSIS — E89.0 POSTSURGICAL HYPOTHYROIDISM: ICD-10-CM

## 2022-12-09 LAB
T4 FREE SERPL-MCNC: 1.2 NG/DL (ref 0.78–1.46)
TSH, 3RD GENERATION: 0.39 UIU/ML (ref 0.36–3.74)

## 2022-12-11 LAB
THYROGLOB AB SERPL-ACNC: <1 IU/ML (ref 0–0.9)
THYROGLOBULIN: <0.1 NG/ML (ref 1.4–29.2)

## 2022-12-15 ENCOUNTER — OFFICE VISIT (OUTPATIENT)
Dept: ENDOCRINOLOGY | Age: 72
End: 2022-12-15
Payer: MEDICARE

## 2022-12-15 VITALS — BODY MASS INDEX: 27.67 KG/M2 | WEIGHT: 182 LBS | SYSTOLIC BLOOD PRESSURE: 120 MMHG | DIASTOLIC BLOOD PRESSURE: 78 MMHG

## 2022-12-15 DIAGNOSIS — C73 PAPILLARY THYROID CARCINOMA (HCC): Primary | ICD-10-CM

## 2022-12-15 DIAGNOSIS — E89.0 POSTSURGICAL HYPOTHYROIDISM: ICD-10-CM

## 2022-12-15 PROCEDURE — 4004F PT TOBACCO SCREEN RCVD TLK: CPT | Performed by: INTERNAL MEDICINE

## 2022-12-15 PROCEDURE — 1123F ACP DISCUSS/DSCN MKR DOCD: CPT | Performed by: INTERNAL MEDICINE

## 2022-12-15 PROCEDURE — G8427 DOCREV CUR MEDS BY ELIG CLIN: HCPCS | Performed by: INTERNAL MEDICINE

## 2022-12-15 PROCEDURE — 3017F COLORECTAL CA SCREEN DOC REV: CPT | Performed by: INTERNAL MEDICINE

## 2022-12-15 PROCEDURE — 3078F DIAST BP <80 MM HG: CPT | Performed by: INTERNAL MEDICINE

## 2022-12-15 PROCEDURE — G8417 CALC BMI ABV UP PARAM F/U: HCPCS | Performed by: INTERNAL MEDICINE

## 2022-12-15 PROCEDURE — 3074F SYST BP LT 130 MM HG: CPT | Performed by: INTERNAL MEDICINE

## 2022-12-15 PROCEDURE — G8484 FLU IMMUNIZE NO ADMIN: HCPCS | Performed by: INTERNAL MEDICINE

## 2022-12-15 PROCEDURE — 99214 OFFICE O/P EST MOD 30 MIN: CPT | Performed by: INTERNAL MEDICINE

## 2022-12-15 RX ORDER — LEVOTHYROXINE SODIUM 137 UG/1
137 TABLET ORAL
Qty: 90 TABLET | Refills: 3
Start: 2022-12-15

## 2022-12-15 NOTE — PROGRESS NOTES
Robina Akbar MD, [address] [facility]            Reason for visit: Follow-up of thyroid cancer      ASSESSMENT AND PLAN:    1. Papillary thyroid carcinoma ([address]  His thyroid cancer was low risk. Following thyroidectomy and radioactive iodine treatment in 2015, all testing has been suggestive of cure. Thyroglobulin remains undetectable. Going forward, he can be followed with once or twice yearly thyroglobulin. Ultrasound is not necessary unless thyroglobulin or physical exam changes. TSH target is 0.1-1.5.    - Thyroglobulin Panel; Future    2. Postsurgical hypothyroidism  TSH is at target. - levothyroxine (LEVOXYL) 137 MCG tablet; Take 1 tablet by mouth every morning (before breakfast)  Dispense: 90 tablet; Refill: 3  - TSH; Future  - T4, Free; Future      Follow-up and Dispositions    Return in about 1 year (around 12/15/2023). History of Present Illness:    Marcy Keane presents to the office for follow up of papillary thyroid carcinoma and postsurgical hypothyroidism. He underwent thyroidectomy and received radioactive iodine remnant ablation in 2015. Presentation of thyroid cancer: Mr. Kvng Marin was evaluated by Dr. Ok Gage for a sonographically suspicious thyroid nodule. Biopsy was benign, but because of the presence of multiple nodules, thyroidectomy was performed. Surgical pathology revealed the presence of multifocal papillary thyroid carcinoma. Prior treatment:   5/26/2015: Thyroidectomy and central lymph node dissection Adolph Rivero)- multifocal papillary thyroid carcinoma (follicular variant) in the left lobe (3 tumors, largest 0.9 cm); indeterminate lymphatic invasion; indeterminate extrathyroidal extension; focal involvement of resection margin (left posterior lobe); background chronic lymphocytic thyroiditis; 0 of 3 lymph nodes positive. 7/22/2015: Thyrogen-stimulated remnant ablation with 54.5 mCi iodine-131.      Labs: 2015: TSH 2.233.  2015: Thyrogen-stimulated thyroglobulin less than 0.4, thyroglobulin antibodies 2.  10/23/2015: TSH 0.962, free T4 1.2.  2016: TSH 0.510, free T4 1.22, thyroglobulin less than 0.1, thyroglobulin antibodies less than 1.  2017: TSH 0.633, free T4 1.50, thyroglobulin less than 0.1, thyroglobulin antibodies less than 1.0.  2017: TSH 0.270, free T4 1.3, thyroglobulin less than 0.1, thyroglobulin antibodies less than 1.0.  2017: TSH 1.350, free T4 1.47.  2018: TSH 1.650, free T4 1.47, thyroglobulin less than 0.1, thyroglobulin antibodies less than 1.0.  2018: TSH 1.210, free T4 1.31, thyroglobulin less than 0.1, thyroglobulin antibodies less than 1.0.  2019: TSH 1.240, free T4 1.42, thyroglobulin less than 0.1, thyroglobulin antibodies less than 1.0.  2020: TSH 0.748, free T4 1.52, thyroglobulin less than 0.1, thyroglobulin antibodies less than 1.0.  2021: TSH 4.780, free T4 1.19, thyroglobulin less than 0.1, thyroglobulin antibodies less than 1.0.  2022: TSH 0.145, free T4 1.2, thyroglobulin less than 0.1, thyroglobulin antibodies less than 1.0.  2022: TSH 0.394, free T4 1.2, thyroglobulin less than 0.1, thyroglobulin antibodies less than 1.0. Imagin2015: Thyrogen-stimulated posttreatment whole body scan [facility])- uptake in the anterior left neck. Abnormal uptake in the left upper abdominal quadrant. Treatment of hypothyroidism: He was started on Levoxyl 125 mcg daily following thyroidectomy. His dose has been adjusted as follows:  -137 mcg daily 2015  -125 mcg daily 2017  -137 mcg daily 2021    Symptoms: See review of systems below    Review of Systems   Constitutional:  Positive for fatigue (mild) and unexpected weight change (gained 5 pounds in 6 months). Cardiovascular:  Negative for palpitations. Neurological:  Negative for tremors.      /78 (Site: Right Upper Arm, Position: Sitting) Wt 182 lb (82.6 kg)   BMI 27.67 kg/m²   Wt Readings from Last 3 Encounters:   12/15/22 182 lb (82.6 kg)   06/14/22 177 lb (80.3 kg)   05/09/22 178 lb (80.7 kg)       Physical Exam  Constitutional:       Appearance: Normal appearance. HENT:      Head: Normocephalic. Neck:      Thyroid: No thyroid mass or thyromegaly. Comments: Healed thyroidectomy scar. No palpable neck masses. Cardiovascular:      Rate and Rhythm: Normal rate and regular rhythm. Pulmonary:      Effort: Pulmonary effort is normal.      Breath sounds: Normal breath sounds. Neurological:      Mental Status: He is alert. Psychiatric:         Mood and Affect: Mood normal.         Behavior: Behavior normal.       Orders Placed This Encounter   Procedures    TSH     Standing Status:   Future     Standing Expiration Date:   12/15/2024    T4, Free     Standing Status:   Future     Standing Expiration Date:   12/15/2024    Thyroglobulin Panel     Standing Status:   Future     Standing Expiration Date:   12/15/2024       Current Outpatient Medications   Medication Sig Dispense Refill    levothyroxine (LEVOXYL) 137 MCG tablet Take 1 tablet by mouth every morning (before breakfast) 90 tablet 3    KRILL OIL PO Take by mouth      aspirin 81 MG EC tablet Take 81 mg by mouth      vitamin D 25 MCG (1000 UT) CAPS Take 2,000 Units by mouth daily      Coenzyme Q10 100 MG TABS Take 200 mg by mouth daily      Cyanocobalamin 5000 MCG TBDP Take 1 tablet by mouth      metoprolol succinate (TOPROL XL) 25 MG extended release tablet Take 25 mg by mouth daily      nitroGLYCERIN (NITROSTAT) 0.4 MG SL tablet Place 0.4 mg under the tongue      pravastatin (PRAVACHOL) 80 MG tablet Take 80 mg by mouth      allopurinol (ZYLOPRIM) 300 MG tablet Take 300 mg by mouth      amitriptyline (ELAVIL) 10 MG tablet Take 10 mg by mouth       No current facility-administered medications for this visit.        Chanel Barton MD, FACE      Portions of this note were generated with the assistance of voice recognition software. As such, some errors in transcription may be present.

## 2023-05-12 DIAGNOSIS — E89.0 POSTSURGICAL HYPOTHYROIDISM: ICD-10-CM

## 2023-05-12 RX ORDER — LEVOTHYROXINE SODIUM 137 UG/1
137 TABLET ORAL
Qty: 90 TABLET | Refills: 3 | Status: SHIPPED | OUTPATIENT
Start: 2023-05-12

## 2023-05-12 NOTE — TELEPHONE ENCOUNTER
Pt is requesting a refill on pended medication. Pharmacy was verified.   Last OV: 12/15/22  Next OV:  12/15/23

## 2023-12-06 DIAGNOSIS — C73 PAPILLARY THYROID CARCINOMA (HCC): ICD-10-CM

## 2023-12-06 DIAGNOSIS — E89.0 POSTSURGICAL HYPOTHYROIDISM: ICD-10-CM

## 2023-12-06 LAB
T4 FREE SERPL-MCNC: 1 NG/DL (ref 0.78–1.46)
TSH, 3RD GENERATION: 1.4 UIU/ML (ref 0.36–3.74)

## 2023-12-07 LAB
THYROGLOB AB SERPL-ACNC: <1 IU/ML (ref 0–0.9)
THYROGLOBULIN: <0.1 NG/ML (ref 1.4–29.2)

## 2023-12-15 ENCOUNTER — OFFICE VISIT (OUTPATIENT)
Dept: ENDOCRINOLOGY | Age: 73
End: 2023-12-15
Payer: MEDICARE

## 2023-12-15 VITALS — BODY MASS INDEX: 28.59 KG/M2 | WEIGHT: 188 LBS | SYSTOLIC BLOOD PRESSURE: 125 MMHG | DIASTOLIC BLOOD PRESSURE: 80 MMHG

## 2023-12-15 DIAGNOSIS — C73 PAPILLARY THYROID CARCINOMA (HCC): Primary | ICD-10-CM

## 2023-12-15 DIAGNOSIS — E89.0 POSTSURGICAL HYPOTHYROIDISM: ICD-10-CM

## 2023-12-15 PROCEDURE — 3017F COLORECTAL CA SCREEN DOC REV: CPT | Performed by: INTERNAL MEDICINE

## 2023-12-15 PROCEDURE — 3079F DIAST BP 80-89 MM HG: CPT | Performed by: INTERNAL MEDICINE

## 2023-12-15 PROCEDURE — 3074F SYST BP LT 130 MM HG: CPT | Performed by: INTERNAL MEDICINE

## 2023-12-15 PROCEDURE — 99214 OFFICE O/P EST MOD 30 MIN: CPT | Performed by: INTERNAL MEDICINE

## 2023-12-15 PROCEDURE — G8417 CALC BMI ABV UP PARAM F/U: HCPCS | Performed by: INTERNAL MEDICINE

## 2023-12-15 PROCEDURE — 4004F PT TOBACCO SCREEN RCVD TLK: CPT | Performed by: INTERNAL MEDICINE

## 2023-12-15 PROCEDURE — 1123F ACP DISCUSS/DSCN MKR DOCD: CPT | Performed by: INTERNAL MEDICINE

## 2023-12-15 PROCEDURE — G8427 DOCREV CUR MEDS BY ELIG CLIN: HCPCS | Performed by: INTERNAL MEDICINE

## 2023-12-15 PROCEDURE — G8484 FLU IMMUNIZE NO ADMIN: HCPCS | Performed by: INTERNAL MEDICINE

## 2023-12-15 RX ORDER — LEVOTHYROXINE SODIUM 0.12 MG/1
125 TABLET ORAL
Qty: 90 TABLET | Refills: 3
Start: 2023-12-15

## 2023-12-15 NOTE — PROGRESS NOTES
Anai Wallace MD, [facility]            Reason for visit: Follow-up of thyroid cancer      ASSESSMENT AND PLAN:    1. Papillary thyroid carcinoma ([address])  His thyroid cancer was low risk. Following thyroidectomy and radioactive iodine treatment in 2015, all testing has been suggestive of cure. Thyroglobulin remains undetectable. Going forward, he can be followed with once yearly thyroglobulin. Ultrasound is not necessary unless thyroglobulin or physical exam changes. TSH target is 0.1-1.5. Return in 1 year with labs. - Thyroglobulin Panel; Future    2. Postsurgical hypothyroidism  At some point since I saw Mr. Shanice Leon, his primary care physician has reduced his levothyroxine dose. I do not have access to any of those records, and I did not receive lab results from Dr. Russell Hernandez office. I should note that Mr. Quinonez's goal TSH should be 0.1-1.5, so it is not necessary to reduce levothyroxine doses when TSH is slightly suppressed. Fortunately, his TSH remains at target on a lower levothyroxine dose. No changes today. I would request that I be kept in the loop if it is necessary for her levothyroxine doses to be adjusted. - levothyroxine (LEVOXYL) 125 MCG tablet; Take 1 tablet by mouth every morning (before breakfast)  Dispense: 90 tablet; Refill: 3  - TSH; Future  - T4, Free; Future      Follow-up and Dispositions    Return in about 1 year (around 12/15/2024). History of Present Illness:    Demarcus Mcknight presents to the office for follow up of papillary thyroid carcinoma and postsurgical hypothyroidism. He underwent thyroidectomy and received radioactive iodine remnant ablation in 2015. Presentation of thyroid cancer: Mr. Shanice Leon was evaluated by Dr. Dawna Samson for a sonographically suspicious thyroid nodule. Biopsy was benign, but because of the presence of multiple nodules, thyroidectomy was performed.  Surgical pathology revealed

## 2024-05-18 DIAGNOSIS — E89.0 POSTSURGICAL HYPOTHYROIDISM: ICD-10-CM

## 2024-05-22 RX ORDER — LEVOTHYROXINE SODIUM 0.12 MG/1
TABLET ORAL
Refills: 0 | OUTPATIENT
Start: 2024-05-22

## 2024-12-04 DIAGNOSIS — C73 PAPILLARY THYROID CARCINOMA (HCC): ICD-10-CM

## 2024-12-04 DIAGNOSIS — E89.0 POSTSURGICAL HYPOTHYROIDISM: ICD-10-CM

## 2024-12-04 LAB
T4 FREE SERPL-MCNC: 1.3 NG/DL (ref 0.9–1.7)
TSH, 3RD GENERATION: 1.62 UIU/ML (ref 0.27–4.2)

## 2024-12-06 LAB
THYROGLOB AB SERPL-ACNC: <1 IU/ML (ref 0–0.9)
THYROGLOBULIN: <0.1 NG/ML (ref 1.4–29.2)

## 2024-12-16 ASSESSMENT — ENCOUNTER SYMPTOMS: CONSTIPATION: 0

## 2024-12-16 NOTE — PROGRESS NOTES
ULISES Ray MD, [facility] ENDOCRINOLOGY   AND   [facility]ODULE [facility]            Reason for visit: Follow-up of thyroid cancer      ASSESSMENT AND PLAN:    1. History of papillary thyroid carcinoma, multifocal (largest 0.9 cm), 0/3 lymph nodes positive status post thyroidectomy 5/26/2015 and 54.5 mCi iodine-131 7/22/2015  His thyroid cancer was low risk.  Following thyroidectomy and radioactive iodine treatment in 2015, all testing has been suggestive of cure.  Thyroglobulin remains undetectable.  Going forward, he can be followed with once yearly thyroglobulin.  Ultrasound is not necessary unless thyroglobulin or physical exam changes.    TSH target is 0.1-1.5.  Return in 1 year with labs.  I will provide him with longitudinal care.  - Thyroglobulin Ab and Thyroglobulin, GURPREET or THOMAS; Future    2. Postsurgical hypothyroidism  TSH target is 0.1-1.5.  Continue levothyroxine as prescribed.  Reassess in 1 year.  - levothyroxine (LEVOXYL) 125 MCG tablet; Take 1 tablet by mouth every morning (before breakfast)  Dispense: 90 tablet; Refill: 3  - TSH; Future  - T4, Free; Future      Follow-up and Dispositions    Return in about 1 year (around 12/17/2025).           History of Present Illness:    Mathew Quinonez presents to the office for follow up of papillary thyroid carcinoma and postsurgical hypothyroidism. He underwent thyroidectomy and received radioactive iodine remnant ablation in 2015.     Presentation of thyroid cancer: Mr. Quinonez was evaluated by Dr. Casetllanos for a sonographically suspicious thyroid nodule. Biopsy was benign, but because of the presence of multiple nodules, thyroidectomy was performed. Surgical pathology revealed the presence of multifocal papillary thyroid carcinoma.     Prior treatment:   5/26/2015: Thyroidectomy and central lymph node dissection ([facility])- multifocal papillary thyroid carcinoma (follicular variant) in the left lobe (3 tumors, largest 0.9 cm); indeterminate

## 2024-12-17 ENCOUNTER — OFFICE VISIT (OUTPATIENT)
Dept: ENDOCRINOLOGY | Age: 74
End: 2024-12-17
Payer: MEDICARE

## 2024-12-17 VITALS
WEIGHT: 187 LBS | SYSTOLIC BLOOD PRESSURE: 120 MMHG | HEIGHT: 68 IN | OXYGEN SATURATION: 98 % | HEART RATE: 49 BPM | DIASTOLIC BLOOD PRESSURE: 68 MMHG | BODY MASS INDEX: 28.34 KG/M2

## 2024-12-17 DIAGNOSIS — E89.0 POSTSURGICAL HYPOTHYROIDISM: ICD-10-CM

## 2024-12-17 DIAGNOSIS — Z85.850 HISTORY OF THYROID CANCER: Primary | ICD-10-CM

## 2024-12-17 PROCEDURE — 1123F ACP DISCUSS/DSCN MKR DOCD: CPT | Performed by: INTERNAL MEDICINE

## 2024-12-17 PROCEDURE — 3017F COLORECTAL CA SCREEN DOC REV: CPT | Performed by: INTERNAL MEDICINE

## 2024-12-17 PROCEDURE — G8419 CALC BMI OUT NRM PARAM NOF/U: HCPCS | Performed by: INTERNAL MEDICINE

## 2024-12-17 PROCEDURE — G8484 FLU IMMUNIZE NO ADMIN: HCPCS | Performed by: INTERNAL MEDICINE

## 2024-12-17 PROCEDURE — 3078F DIAST BP <80 MM HG: CPT | Performed by: INTERNAL MEDICINE

## 2024-12-17 PROCEDURE — 3074F SYST BP LT 130 MM HG: CPT | Performed by: INTERNAL MEDICINE

## 2024-12-17 PROCEDURE — G8427 DOCREV CUR MEDS BY ELIG CLIN: HCPCS | Performed by: INTERNAL MEDICINE

## 2024-12-17 PROCEDURE — 99214 OFFICE O/P EST MOD 30 MIN: CPT | Performed by: INTERNAL MEDICINE

## 2024-12-17 PROCEDURE — 1159F MED LIST DOCD IN RCRD: CPT | Performed by: INTERNAL MEDICINE

## 2024-12-17 PROCEDURE — 4004F PT TOBACCO SCREEN RCVD TLK: CPT | Performed by: INTERNAL MEDICINE

## 2024-12-17 PROCEDURE — G2211 COMPLEX E/M VISIT ADD ON: HCPCS | Performed by: INTERNAL MEDICINE

## 2024-12-17 RX ORDER — LEVOTHYROXINE SODIUM 125 UG/1
125 TABLET ORAL
Qty: 90 TABLET | Refills: 3 | Status: SHIPPED | OUTPATIENT
Start: 2024-12-17

## 2024-12-17 ASSESSMENT — ENCOUNTER SYMPTOMS: DIARRHEA: 0

## 2025-07-25 ENCOUNTER — TELEPHONE (OUTPATIENT)
Dept: ENDOCRINOLOGY | Age: 75
End: 2025-07-25

## 2025-07-25 NOTE — TELEPHONE ENCOUNTER
Patient LVM stating he has to have procedure on esophagus and was prescribed Protonix. Requesting to check with Dr. Ray to see if Protonix is safe to take with current medications? Please advise.

## 2025-07-28 NOTE — TELEPHONE ENCOUNTER
Spoke with patient and explained taking Protonix with current medications is fine Per Dr. Ray. Patient verbalized understanding.

## (undated) DEVICE — Device

## (undated) DEVICE — SUTURE PDS II SZ 1 L96IN ABSRB VLT TP-1 L65MM 1/2 CIR Z880G

## (undated) DEVICE — TRAY PREP DRY W/ PREM GLV 2 APPL 6 SPNG 2 UNDPD 1 OVERWRAP

## (undated) DEVICE — REM POLYHESIVE ADULT PATIENT RETURN ELECTRODE: Brand: VALLEYLAB

## (undated) DEVICE — SUTURE PERMAHAND SZ 3-0 L18IN NONABSORBABLE BLK L26MM SH C013D

## (undated) DEVICE — SUTURE VCRL SZ 3-0 L18IN ABSRB UD L26MM SH 1/2 CIR J864D

## (undated) DEVICE — SINGLE BASIN: Brand: CARDINAL HEALTH

## (undated) DEVICE — GLOVE SURG SZ 65 CRM LTX FREE POLYISOPRENE POLYMER BEAD ANTI

## (undated) DEVICE — CANISTER, RIGID, 2000CC: Brand: MEDLINE INDUSTRIES, INC.

## (undated) DEVICE — SOLUTION IV 1000ML 0.9% SOD CHL

## (undated) DEVICE — CONTAINER SPEC HISTOLOGY 900ML POLYPR

## (undated) DEVICE — BLADE ELECTRODE: Brand: VALLEYLAB

## (undated) DEVICE — SURGICAL PROCEDURE PACK BASIC ST FRANCIS

## (undated) DEVICE — SHEET, T, LAPAROTOMY, STERILE: Brand: MEDLINE

## (undated) DEVICE — SUTURE PERMAHAND SZ 3-0 L18IN NONABSORBABLE BLK SILK BRAID A184H

## (undated) DEVICE — SUTURE PERMAHAND SZ 2-0 L12X18IN NONABSORBABLE BLK SILK A185H

## (undated) DEVICE — DRAPE TWL SURG 16X26IN BLU ORB04] ALLCARE INC]

## (undated) DEVICE — CONTAINER SPEC FRMLN 120ML --

## (undated) DEVICE — TOTAL TRAY, DB, 100% SILI FOLEY, 16FR 10: Brand: MEDLINE

## (undated) DEVICE — GLOVE SURG SZ 7 L12IN FNGR THK79MIL GRN LTX FREE